# Patient Record
Sex: MALE | Race: WHITE | NOT HISPANIC OR LATINO | Employment: UNEMPLOYED | URBAN - METROPOLITAN AREA
[De-identification: names, ages, dates, MRNs, and addresses within clinical notes are randomized per-mention and may not be internally consistent; named-entity substitution may affect disease eponyms.]

---

## 2017-03-07 ENCOUNTER — ALLSCRIPTS OFFICE VISIT (OUTPATIENT)
Dept: OTHER | Facility: OTHER | Age: 57
End: 2017-03-07

## 2017-03-07 DIAGNOSIS — E11.9 TYPE 2 DIABETES MELLITUS WITHOUT COMPLICATIONS (HCC): ICD-10-CM

## 2017-03-07 LAB — HBA1C MFR BLD HPLC: NORMAL %

## 2017-03-08 ENCOUNTER — LAB (OUTPATIENT)
Dept: LAB | Facility: CLINIC | Age: 57
End: 2017-03-08
Payer: COMMERCIAL

## 2017-03-08 DIAGNOSIS — E11.9 TYPE 2 DIABETES MELLITUS WITHOUT COMPLICATIONS (HCC): ICD-10-CM

## 2017-03-08 LAB
ALBUMIN SERPL BCP-MCNC: 3.4 G/DL (ref 3.5–5)
ALP SERPL-CCNC: 69 U/L (ref 46–116)
ALT SERPL W P-5'-P-CCNC: 27 U/L (ref 12–78)
ANION GAP SERPL CALCULATED.3IONS-SCNC: 7 MMOL/L (ref 4–13)
AST SERPL W P-5'-P-CCNC: 18 U/L (ref 5–45)
BILIRUB DIRECT SERPL-MCNC: 0.12 MG/DL (ref 0–0.2)
BILIRUB SERPL-MCNC: 0.42 MG/DL (ref 0.2–1)
BUN SERPL-MCNC: 11 MG/DL (ref 5–25)
CALCIUM SERPL-MCNC: 8.7 MG/DL (ref 8.3–10.1)
CHLORIDE SERPL-SCNC: 104 MMOL/L (ref 100–108)
CHOLEST SERPL-MCNC: 169 MG/DL (ref 50–200)
CO2 SERPL-SCNC: 29 MMOL/L (ref 21–32)
CREAT SERPL-MCNC: 0.68 MG/DL (ref 0.6–1.3)
EST. AVERAGE GLUCOSE BLD GHB EST-MCNC: 126 MG/DL
GFR SERPL CREATININE-BSD FRML MDRD: >60 ML/MIN/1.73SQ M
GLUCOSE SERPL-MCNC: 93 MG/DL (ref 65–140)
HBA1C MFR BLD: 6 % (ref 4.2–6.3)
HDLC SERPL-MCNC: 55 MG/DL (ref 40–60)
LDLC SERPL CALC-MCNC: 87 MG/DL (ref 0–100)
POTASSIUM SERPL-SCNC: 3.9 MMOL/L (ref 3.5–5.3)
PROT SERPL-MCNC: 7.2 G/DL (ref 6.4–8.2)
SODIUM SERPL-SCNC: 140 MMOL/L (ref 136–145)
TRIGL SERPL-MCNC: 136 MG/DL

## 2017-03-08 PROCEDURE — 80061 LIPID PANEL: CPT

## 2017-03-08 PROCEDURE — 80053 COMPREHEN METABOLIC PANEL: CPT

## 2017-03-08 PROCEDURE — 36415 COLL VENOUS BLD VENIPUNCTURE: CPT

## 2017-03-08 PROCEDURE — 82248 BILIRUBIN DIRECT: CPT

## 2017-03-08 PROCEDURE — 83036 HEMOGLOBIN GLYCOSYLATED A1C: CPT

## 2017-03-23 ENCOUNTER — GENERIC CONVERSION - ENCOUNTER (OUTPATIENT)
Dept: OTHER | Facility: OTHER | Age: 57
End: 2017-03-23

## 2017-04-07 ENCOUNTER — ALLSCRIPTS OFFICE VISIT (OUTPATIENT)
Dept: OTHER | Facility: OTHER | Age: 57
End: 2017-04-07

## 2017-06-05 ENCOUNTER — GENERIC CONVERSION - ENCOUNTER (OUTPATIENT)
Dept: OTHER | Facility: OTHER | Age: 57
End: 2017-06-05

## 2017-06-27 ENCOUNTER — ALLSCRIPTS OFFICE VISIT (OUTPATIENT)
Dept: OTHER | Facility: OTHER | Age: 57
End: 2017-06-27

## 2017-06-27 ENCOUNTER — TRANSCRIBE ORDERS (OUTPATIENT)
Dept: ADMINISTRATIVE | Facility: HOSPITAL | Age: 57
End: 2017-06-27

## 2017-06-27 DIAGNOSIS — G47.30 SLEEP APNEA, UNSPECIFIED TYPE: Primary | ICD-10-CM

## 2017-07-10 ENCOUNTER — HOSPITAL ENCOUNTER (OUTPATIENT)
Dept: SLEEP CENTER | Facility: CLINIC | Age: 57
Discharge: HOME | End: 2017-07-10
Payer: COMMERCIAL

## 2017-07-10 DIAGNOSIS — G47.33 OBSTRUCTIVE SLEEP APNEA OF ADULT: ICD-10-CM

## 2017-07-10 DIAGNOSIS — G47.30 SLEEP APNEA, UNSPECIFIED TYPE: ICD-10-CM

## 2017-07-10 PROCEDURE — 95811 POLYSOM 6/>YRS CPAP 4/> PARM: CPT

## 2017-08-24 ENCOUNTER — APPOINTMENT (OUTPATIENT)
Dept: LAB | Facility: CLINIC | Age: 57
End: 2017-08-24
Payer: COMMERCIAL

## 2017-08-24 ENCOUNTER — GENERIC CONVERSION - ENCOUNTER (OUTPATIENT)
Dept: OTHER | Facility: OTHER | Age: 57
End: 2017-08-24

## 2017-08-24 ENCOUNTER — TRANSCRIBE ORDERS (OUTPATIENT)
Dept: SLEEP CENTER | Facility: CLINIC | Age: 57
End: 2017-08-24

## 2017-08-24 DIAGNOSIS — E11.9 TYPE 2 DIABETES MELLITUS WITHOUT COMPLICATIONS (HCC): ICD-10-CM

## 2017-08-24 DIAGNOSIS — G47.61 PERIODIC LIMB MOVEMENT DISORDER: ICD-10-CM

## 2017-08-24 DIAGNOSIS — G47.33 OSA (OBSTRUCTIVE SLEEP APNEA): Primary | ICD-10-CM

## 2017-08-24 LAB
FERRITIN SERPL-MCNC: 148 NG/ML (ref 8–388)
FOLATE SERPL-MCNC: >20 NG/ML (ref 3.1–17.5)
VIT B12 SERPL-MCNC: 391 PG/ML (ref 100–900)

## 2017-08-24 PROCEDURE — 82607 VITAMIN B-12: CPT

## 2017-08-24 PROCEDURE — 36415 COLL VENOUS BLD VENIPUNCTURE: CPT

## 2017-08-24 PROCEDURE — 82746 ASSAY OF FOLIC ACID SERUM: CPT

## 2017-08-24 PROCEDURE — 82728 ASSAY OF FERRITIN: CPT

## 2017-09-03 ENCOUNTER — HOSPITAL ENCOUNTER (OUTPATIENT)
Dept: SLEEP CENTER | Facility: CLINIC | Age: 57
Discharge: HOME | End: 2017-09-03
Payer: COMMERCIAL

## 2017-09-11 ENCOUNTER — HOSPITAL ENCOUNTER (OUTPATIENT)
Dept: SLEEP CENTER | Facility: CLINIC | Age: 57
Discharge: HOME | End: 2017-09-11
Payer: COMMERCIAL

## 2017-09-11 ENCOUNTER — TRANSCRIBE ORDERS (OUTPATIENT)
Dept: SLEEP CENTER | Facility: CLINIC | Age: 57
End: 2017-09-11

## 2017-09-11 DIAGNOSIS — G47.33 OSA (OBSTRUCTIVE SLEEP APNEA): ICD-10-CM

## 2017-09-11 DIAGNOSIS — G47.33 OSA (OBSTRUCTIVE SLEEP APNEA): Primary | ICD-10-CM

## 2017-11-08 ENCOUNTER — HOSPITAL ENCOUNTER (OUTPATIENT)
Dept: SLEEP CENTER | Facility: CLINIC | Age: 57
Discharge: HOME | End: 2017-11-08
Payer: COMMERCIAL

## 2017-11-08 ENCOUNTER — TRANSCRIBE ORDERS (OUTPATIENT)
Dept: SLEEP CENTER | Facility: CLINIC | Age: 57
End: 2017-11-08

## 2017-11-08 DIAGNOSIS — G47.33 OSA (OBSTRUCTIVE SLEEP APNEA): ICD-10-CM

## 2017-11-08 DIAGNOSIS — G47.33 OSA (OBSTRUCTIVE SLEEP APNEA): Primary | ICD-10-CM

## 2017-11-08 NOTE — PROGRESS NOTES
Follow-Up Note - Sloane   65 y o  male  :1960  VEC:[id number]    CC: I saw this patient for follow-up in clinic today for his Sleep Disordered Breathing, Coexisting Sleep and Medical Problems  Past medical, Family, Social History, ROS and medications were reviewed  Herewith my findings in summary  Full Details are available on request      HPI:  With respect to his positive airway pressure therapy he reports use  7  nights/week for at least  4  hours and is benefiting from  use  He experiencing no major difficulties tolerating or adverse effects  He reports dry mouth  Compliance data onfirms use > 4 hours 87 percent  of the time ] The AHI is 25/hour at  pressure of 8 centimeter H2O  However, his titration study demonstrated while 8 centimeters was sufficient he was not observed while supine  Sleep Routine: Sharlene Trinh reports getting 5-7 hours sleep; he is no longer having difficulty initiating or maintaining sleep  He has nocturia around 2 times a night  Francisca Settle He awakens feeling refreshed and denied excessive drowsiness  He rated himself at to /24 on the Clawson sleepiness scale  He is not aware of jerking movements during sleep  On Exam:  Apart from weight increase to 247 pounds, Physical findings are essentially unchanged from previous  Impression :   1  Severe Obstructive Sleep Apnea  2  Severe periodic limb movements of sleep  3  Insomnia - improved  4  Dry mouth  5  Morbid obesity  6  Comorbidities:  Hypertension and diabetes    Plan:  1  Treatment with  PAP is medically necessary and Sharlene Trinh is agreable to continue use  Pressure setting: Change to auto titrating Pap at 8-15 centimeters   2  Instruction on care of equipment and strategies to improve comfort with use of PAP were discussed  A prescription to replace supplies as needed was provided and care coordinated with the DME provider     3  Need for compliance with therapy and weight reduction were emphasized  4  I advised on sleep hygiene specifically increasing the amount of sleep that he gets  5  No treatment is needed for his periodic limb movements of sleep  6  Follow-up is advised in 3 months or sooner if needed to monitor progress and to adjust therapy  Thank you for allowing me to participate in the care of this patient      Sincerely,    Taniya Nugent MD  Board Certified Specialist

## 2017-11-15 ENCOUNTER — HOSPITAL ENCOUNTER (OUTPATIENT)
Dept: SLEEP CENTER | Facility: CLINIC | Age: 57
Discharge: HOME | End: 2017-11-15
Payer: COMMERCIAL

## 2017-11-15 ENCOUNTER — TRANSCRIBE ORDERS (OUTPATIENT)
Dept: SLEEP CENTER | Facility: CLINIC | Age: 57
End: 2017-11-15

## 2017-11-15 DIAGNOSIS — G47.33 OSA ON CPAP: ICD-10-CM

## 2017-11-15 DIAGNOSIS — Z99.89 OSA ON CPAP: Primary | ICD-10-CM

## 2017-11-15 DIAGNOSIS — G47.33 OSA ON CPAP: Primary | ICD-10-CM

## 2017-11-15 DIAGNOSIS — Z99.89 OSA ON CPAP: ICD-10-CM

## 2018-01-05 ENCOUNTER — ALLSCRIPTS OFFICE VISIT (OUTPATIENT)
Dept: OTHER | Facility: OTHER | Age: 58
End: 2018-01-05

## 2018-01-05 DIAGNOSIS — M79.604 PAIN OF RIGHT LEG: ICD-10-CM

## 2018-01-05 DIAGNOSIS — E11.9 TYPE 2 DIABETES MELLITUS WITHOUT COMPLICATIONS (HCC): ICD-10-CM

## 2018-01-05 LAB — HBA1C MFR BLD HPLC: 5.8 %

## 2018-01-09 NOTE — PROGRESS NOTES
Assessment   1  Diffuse pain in right lower extremity (729 5) (M79 604)   2  Diabetes mellitus, type 2 (250 00) (E11 9)    Plan   Diabetes mellitus, type 2    · (1) MICROALBUMIN CREATININE RATIO, RANDOM URINE; Status:Active; Requested KGP:[id number]; Diffuse pain in right lower extremity    · Orthopedic Surgery Referral Other Co-Management  *  Status: Hold For - Scheduling  Requested for:    [id number]  are Referring to a non- Preferred Provider : Provider not listed in [employer]  Care Summary provided  : Yes    Discussion/Summary   Discussion Summary:    Right leg pain is given referral order for orthopedic  He plans to go to [facility]  Mellitus A1C ordered  Microalbumin ordered  Advised to continue taking meds as directed  Counseling Documentation With Imm: The patient was counseled regarding diagnostic results,-- instructions for management,-- risk factor reductions,-- risks and benefits of treatment options,-- importance of compliance with treatment  Patient Education: Educational resources provided:    Medication SE Review and Pt Understands Tx: Possible side effects of new medications were reviewed with the patient/guardian today  The treatment plan was reviewed with the patient/guardian  The patient/guardian understands and agrees with the treatment plan      Chief Complaint   Chief Complaint Free Text Note Form: Presents to the clinic for follow-up from the emergency department      History of Present Illness   HPI: as above leg pain started yesterday two weeks ago  The pain was unprovoked, worse with walking  The pain resolved after several days [facility] the [facility] increased on the right [facility]  It was at 10/10 at its worse yesterday, constant  He went to [facility] in [place] and today he rates it at 5/10  Able to walk on it today  He was referred to [facility] orthopedic and is requesting PCP referral  Achy pain  Today wearing a leg brace and also uses crutches   Was prescribed ibuprofen 600 mg while at the ED and he has not taken any meds since that time  Has a Hx of DM x 1 year or more  Last A1C was March 2017 and was 6%  Takes metformin 500 mg daily  POC hemoglobin A1c at the clinic today is 5 8%  Urine Microalbumin ordered  could not get his weight b/c he could stand steady on the left leg    Hospital Based Practices Required Assessment:      Pain Assessment      the patient states they have pain  The pain is located in the Right Leg  The patient describes the pain as aching  (on a scale of 0 to 10, the patient rates the pain at 5 )       Prefered Language is  [place]  Primary Language is  English  Education Completed: disease/condition-- and-- treatment/procedure      Teaching Method: verbal      Person Taught: patient      Evaluation Of Learning: verbalized/demonstrated understanding      Review of Systems   Complete-Male:      Constitutional: No fever or chills, feels well, no tiredness, no recent weight gain or weight loss  Cardiovascular: No complaints of slow heart rate, no fast heart rate, no chest pain, no palpitations, no leg claudication, no lower extremity  Respiratory: No complaints of shortness of breath, no wheezing, no cough, no SOB on exertion, no orthopnea or PND  Musculoskeletal: limb pain,-- myalgias-- and-- Right leg pain, but-- as noted in HPI,-- no joint swelling,-- no joint stiffness-- and-- no limb swelling  ROS Reviewed:    ROS reviewed  Active Problems   1  Benign essential hypertension (401 1) (I10)   2  Diabetes mellitus, type 2 (250 00) (E11 9)   3  Hypercholesterolemia (272 0) (E78 00)   4  History of Need for vaccination for DTP (V06 1) (Z23)   5  Obesity (278 00) (E66 9)   6  Obstructive sleep apnea (327 23) (G47 33)   7  Onychomycosis (110 1) (B35 1)   8  Periodic limb movement sleep disorder (327 51) (G47 61)   9  Screening for colon cancer (V76 51) (Z12 11)   10  Sleep disturbance (780 50) (G47 9)   11  Sleep related hypoxia (327 24) (G47 34)   12  Vitamin D deficiency (268 9) (E55 9)   13  Xerosis of skin (706 8) (L85 3)    Past Medical History   1  History of gastroesophageal reflux (GERD) (V12 79) (Z87 19)   2  History of hypertension (V12 59) (Z86 79)   3  History of Need for immunization against influenza (V04 81) (Z23)   4  History of Need for vaccination for DTP (V06 1) (Z23)  Active Problems And Past Medical History Reviewed: The active problems and past medical history were reviewed and updated today  Surgical History   1  History of Amputation Of Finger, With Neurectomy And Flaps  Surgical History Reviewed: The surgical history was reviewed and updated today  Family History   Mother    1  Family history of malignant neoplasm of uterus (V16 49) (Z80 49)  Father    2  Family history of diabetes mellitus (V18 0) (Z83 3)   3  Family history of peripheral vascular disease (V17 49) (Z82 49)  Sister    4  Family history of Bone cancer  Brother    5  Family history of Severe head trauma  Family History Reviewed: The family history was reviewed and updated today  Social History    · Former smoker (V15 82) (F10 020)   · Marital History - Single   · Sedentary Lifestyle (V69 0)   · Sexually Active   · Working Part-time  Social History Reviewed: The social history was reviewed and updated today  The social history was reviewed and is unchanged  Current Meds    1  Accu-Chek Skylar Plus In Vitro Strip; TEST FBS 2x/week; Therapy: 86GLG4814 to (Last Rx:08Mar2017)  Requested for: 53CQA8973 Ordered   2  Accu-Chek Skylar Plus w/Device Kit; USE AS DIRECTED; Therapy: 25NJI8001 to (Last Rx:08Mar2017)  Requested for: 59RCP0174 Ordered   3  Accu-Chek Softclix Lancets Miscellaneous; check FBS 2x/week; Therapy: 45LXP8900 to (Last Rx:08Mar2017)  Requested for: 33OUS5490 Ordered   4  AmLactin 12 % External Lotion; APPLY  AND RUB  IN A THIN FILM TO AFFECTED AREAS TWICE     DAILY  (AM AND PM); Therapy: 44VSE4981 to (Last Rx:18Qzz1078)  Requested for: 22Nek6887 Ordered   5  Lisinopril-Hydrochlorothiazide 20-12 5 MG Oral Tablet; TAKE 1 TABLET DAILY; Therapy: 17PLV0791 to (Evaluate:34Cvw7513)  Requested for: 07Def6382; Last Rx:30Bdi7624     Ordered   6  Lovastatin 10 MG Oral Tablet; Take 1 tablet by mouth at bedtime; Therapy: 03NZH1034 to (Evaluate:17Rbb0880)  Requested for: 49SRP6042; Last Rx:12Kxu9529     Ordered   7  MetFORMIN HCl - 500 MG Oral Tablet; TAKE ONE TABLET BY MOUTH IN THE EVENING; Therapy: 34Ljw1811 to (Evaluate:79Fde0169)  Requested for: 70Ttk7899; Last Rx:40Jcc6170     Ordered   8  Pramipexole Dihydrochloride 0 125 MG Oral Tablet; TAKE 1 TABLET Bedtime; Therapy: 91Iwm9383 to (Evaluate:17Lnh4527)  Requested for: 70ELF3874; Last Rx:27Nov2017     Ordered   9  Vitamin D3 2000 UNIT Oral Capsule; take 1 capsule by mouth once daily; Therapy: 44Qyn6650 to (Vijay Colin)  Requested for: 75QMR1089; Last Rx:45Ksq6334     Ordered  Medication List Reviewed: The medication list was reviewed and updated today  Allergies   1  No Known Drug Allergies  2  No Known Environmental Allergies   3  No Known Food Allergies    Vitals   Vital Signs    Recorded: 61NXX1993 12:56PM   Temperature 97 7 F   Heart Rate 88   Systolic 305   Diastolic 90   Height Unobtainable Yes   Weight Unobtainable Yes     Physical Exam        Constitutional      General appearance: No acute distress, well appearing and well nourished  well developed,-- normal body odor,-- does not smell of feces,-- does not smell of urine,-- well nourished,-- clothing appropriate-- and-- well groomed  Pulmonary      Respiratory effort: No increased work of breathing or signs of respiratory distress  Auscultation of lungs: Clear to auscultation, equal breath sounds bilaterally, no wheezes, no rales, no rhonci         Cardiovascular      Auscultation of heart: Normal rate and rhythm, normal S1 and S2, without murmurs  Musculoskeletal      Gait and station: Abnormal  -- Walks with crutches, with slight limp favoring the right leg  Inspection/palpation of joints, bones, and muscles: Abnormal  -- No apparent leg weakness  No significant swelling  No redness or discoloration anywhere on the leg  Psychiatric      Orientation to person, place and time: Normal        Mood and affect: Normal           Attending Note   Collaborating Physician Note: Collaborating Physician: I agree with the Advanced Practitioner note  Agree with Advanced Practitioner Note Except: In the setting of h/o of HTN, HPL and DMII would consider MILTON of BL LE  Signatures    Electronically signed by : Adin Levy; Jan 5 2018  2:09PM EST                       (Author)     Electronically signed by : Irma Torres, [facility]; Jan 5 2018  2:11PM EST                       (Author)     Electronically signed by :  ANGELICA Srinivasan ; Jan 8 2018 10:01AM EST                       (Review)

## 2018-01-10 ENCOUNTER — APPOINTMENT (OUTPATIENT)
Dept: LAB | Facility: CLINIC | Age: 58
End: 2018-01-10
Payer: COMMERCIAL

## 2018-01-10 DIAGNOSIS — E11.9 TYPE 2 DIABETES MELLITUS WITHOUT COMPLICATIONS (HCC): ICD-10-CM

## 2018-01-10 LAB
CREAT UR-MCNC: 150 MG/DL
MICROALBUMIN UR-MCNC: 12.7 MG/L (ref 0–20)
MICROALBUMIN/CREAT 24H UR: 8 MG/G CREATININE (ref 0–30)

## 2018-01-10 PROCEDURE — 82570 ASSAY OF URINE CREATININE: CPT

## 2018-01-10 PROCEDURE — 82043 UR ALBUMIN QUANTITATIVE: CPT

## 2018-01-10 NOTE — MISCELLANEOUS
Reason For Visit  Reason For Visit Free Text Note Form: Assistance with obtaining Insurance and coordinating health care     Case Management Documentation [facility]:   Information obtained from the patient and medical record  Patient's financial status unemployed and no medical insurance  Action Plan: supportive counseling/advocacy, information provided and Referral to [facility] Assistance  plan reviewed  Progress Note  AIMEE met with this 47 y/o single male pt who is requesting help with getting a sleep study  SW has explained the [facility] which does have a $1,500 00 pt co-pay  Pt can not afford same so will need to reschedule  Pt does have an application pending with [employer] at [address]  Call place to [facility] at [phone] re application at pt's request  Pt does need to supply additional info  Sw has faxed ID card at pt's request and will fax additional info when he has it available Pt also referred to [facility]  It is hopefully pt will be approved and will then be able to get funding for his colonoscopy and blood work  Pt aware that  Laura's assistance would help with some pt co-pays  SW also reviewed pt's medication list Pt should be able to get his medications from [address]   Pt to f/u with AIMEE as needed  Active Problems    1  Benign essential hypertension (401 1) (I10)   2  Hypercholesterolemia (272 0) (E78 0)   3  Impaired fasting glucose (790 21) (R73 01)   4  History of Need for vaccination for DTP (V06 1) (Z23)   5  Obesity (278 00) (E66 9)   6  Sleep disturbance (780 50) (G47 9)    Current Meds   1  Lisinopril-Hydrochlorothiazide 20-12 5 MG Oral Tablet; TAKE 1 TABLET DAILY; Therapy: 94SSW1657 to (Evaluate:48Gxy4854)  Requested for: 53PHT3423; Last   Rx:61Uun1154 Ordered   2  Lovastatin 10 MG Oral Tablet; Take 1 tablet by mouth at bedtime;    Therapy: 52MTX1523 to (Evaluate:99Asp2831) Requested for: 44JAI4869; Last   Rx:01Mar2016 Ordered    Allergies    1  No Known Drug Allergies    2  No Known Environmental Allergies   3   No Known Food Allergies    Signatures   Electronically signed by : Tika Weller LCSW; Mar  3 2016 10:57AM EST                       (Author)

## 2018-01-12 VITALS
DIASTOLIC BLOOD PRESSURE: 82 MMHG | TEMPERATURE: 97.7 F | HEIGHT: 63 IN | WEIGHT: 231.04 LBS | HEART RATE: 100 BPM | SYSTOLIC BLOOD PRESSURE: 140 MMHG | BODY MASS INDEX: 40.94 KG/M2

## 2018-01-12 NOTE — RESULT NOTES
Verified Results  (1) FOLATE 80Mzq2308 12:33PM [facility] [employer] Order Number: [id number]     Test Name Result Flag Reference   FOLATE >20 0 ng/mL H 3 1-17 5     (1) VITAMIN B12 50Hsh7538 12:33PM [employer] Order Number: [id number]     Test Name Result Flag Reference   VITAMIN B12 391 pg/mL  100-900     (1) FERRITIN 31Chz2884 12:33PM [employer] Order Number: [id number]     Test Name Result Flag Reference   FERRITIN 148 ng/mL  8-953

## 2018-01-13 VITALS
WEIGHT: 227.07 LBS | HEART RATE: 72 BPM | DIASTOLIC BLOOD PRESSURE: 80 MMHG | SYSTOLIC BLOOD PRESSURE: 134 MMHG | TEMPERATURE: 98 F | HEIGHT: 62 IN | BODY MASS INDEX: 41.79 KG/M2

## 2018-01-14 VITALS
DIASTOLIC BLOOD PRESSURE: 86 MMHG | WEIGHT: 226.63 LBS | TEMPERATURE: 98.3 F | BODY MASS INDEX: 40.16 KG/M2 | SYSTOLIC BLOOD PRESSURE: 140 MMHG | HEIGHT: 63 IN | HEART RATE: 92 BPM

## 2018-01-15 NOTE — RESULT NOTES
Message   please call pt and inform that his cholesterol profile is OK    triglycerides sl  elevated, so follow a less 'fast/fatty /fried food" diet    also his Vit D level is very low 20 (should be over 30) so I'll be sending in Vit D supplementation to his pharmacy  f/u in 6 months  I just sent a prior task, but did NOT see this HgbA1c of 6 6 = new only Diabetes! pt has been Impaired fasting glucose, but this test pushes him over the line to being diabetic    Rosella Goldmann please call and have him f/u with me to discuss     Verified Results  (1) CBC/PLT/DIFF 22Apr2016 10:21AM Alisa Be     Test Name Result Flag Reference   WBC COUNT 6 94 Thousand/uL  4 31-10 16   RBC COUNT 4 90 Million/uL  3 88-5 62   HEMOGLOBIN 13 8 g/dL  12 0-17 0   HEMATOCRIT 41 5 %  36 5-49 3   MCV 85 fL  82-98   MCH 28 2 pg  26 8-34 3   MCHC 33 3 g/dL  31 4-37 4   RDW 14 1 %  11 6-15 1   MPV 9 1 fL  8 9-12 7   PLATELET COUNT 115 Thousands/uL  149-390   nRBC AUTOMATED 0 /100 WBCs     NEUTROPHILS RELATIVE PERCENT 57 %  43-75   LYMPHOCYTES RELATIVE PERCENT 25 %  14-44   MONOCYTES RELATIVE PERCENT 9 %  4-12   EOSINOPHILS RELATIVE PERCENT 9 % H 0-6   BASOPHILS RELATIVE PERCENT 0 %  0-1   NEUTROPHILS ABSOLUTE COUNT 3 92 Thousands/µL  1 85-7 62   LYMPHOCYTES ABSOLUTE COUNT 1 74 Thousands/µL  0 60-4 47   MONOCYTES ABSOLUTE COUNT 0 60 Thousand/µL  0 17-1 22   EOSINOPHILS ABSOLUTE COUNT 0 65 Thousand/µL H 0 00-0 61   BASOPHILS ABSOLUTE COUNT 0 02 Thousands/µL  0 00-0 10     (1) COMPREHENSIVE METABOLIC PANEL 82QJG3951 66:28BO [place], [address] Kidney Disease Education Program recommendations are as follows:  GFR calculation is accurate only with a steady state creatinine  Chronic Kidney disease less than 60 ml/min/1 73 sq  meters  Kidney failure less than 15 ml/min/1 73 sq  meters       Test Name Result Flag Reference   GLUCOSE,RANDM 91 mg/dL     If the patient is fasting, the ADA then defines impaired fasting glucose as > 100 mg/dL and diabetes as > or equal to 123 mg/dL  SODIUM 134 mmol/L L 136-145   POTASSIUM 3 8 mmol/L  3 5-5 3   CHLORIDE 100 mmol/L  100-108   CARBON DIOXIDE 31 mmol/L  21-32   ANION GAP (CALC) 3 mmol/L L 4-13   BLOOD UREA NITROGEN 11 mg/dL  5-25   CREATININE 0 78 mg/dL  0 60-1 30   Standardized to IDMS reference method   CALCIUM 8 4 mg/dL  8 3-10 1   BILI, TOTAL 0 45 mg/dL  0 20-1 00   ALK PHOSPHATAS 72 U/L     ALT (SGPT) 61 U/L  12-78   AST(SGOT) 32 U/L  5-45   ALBUMIN 3 7 g/dL  3 5-5 0   TOTAL PROTEIN 7 5 g/dL  6 4-8 2   eGFR Non-African American      >60 0 ml/min/1 73sq m     (1) LIPID PANEL, FASTING 22Apr2016 10:21AM Luci Ahmadi   Triglyceride:         Normal              <150 mg/dl       Borderline High    150-199 mg/dl       High               200-499 mg/dl       Very High          >499 mg/dl  Cholesterol:         Desirable        <200 mg/dl      Borderline High  200-239 mg/dl      High             >239 mg/dl  HDL Cholesterol:        High    >59 mg/dL      Low     <41 mg/dL  LDL CALCULATED:    This screening LDL is a calculated result  It does not have the accuracy of the Direct Measured LDL in the monitoring of patients with hyperlipidemia and/or statin therapy  Direct Measure LDL (YHY521) must be ordered separately in these patients  Test Name Result Flag Reference   CHOLESTEROL 149 mg/dL     HDL,DIRECT 38 mg/dL L 40-60   Specimen collection should occur prior to Metamizole administration due to the potential for falsely depressed results  LDL CHOLESTEROL CALCULATED 73 mg/dL  0-100   TRIGLYCERIDES 192 mg/dL H <=150   Specimen collection should occur prior to N-Acetylcysteine or Metamizole administration due to the potential for falsely depressed results  (1) TSH 22Apr2016 10:21AM Kirstie Green   Patients undergoing fluorescein dye angiography may retain small amounts of fluorescein in the body for 48-72 hours post procedure   Samples containing fluorescein can produce falsely depressed TSH values  If the patient had this procedure,a specimen should be resubmitted post fluorescein clearance  Test Name Result Flag Reference   TSH 3 030 uIU/mL  0 358-3 740     (1) VITAMIN D 25-HYDROXY 22Apr2016 10:21AM Filipe Montero     Test Name Result Flag Reference   VIT D 25-HYDROX 19 9 ng/mL L 30 0-100 0     (1) HEMOGLOBIN A1C 18Ubn1342 10:21AM Filipe Montero   5 7-6 4% impaired fasting glucose  >=6 5% diagnosis of diabetes    Falsely low levels are seen in conditions linked to short RBC life span-  hemolytic anemia, and splenomegaly  Falsely elevated levels are seen in situations where there is an increased production of RBC- receipt of erythropoietin or blood transfusions  Adopted from ADA-Clinical Practice Recommendations     Test Name Result Flag Reference   HEMOGLOBIN A1C 6 6 % H 4 0-5 6   EST  AVG   GLUCOSE 143 mg/dl         Plan  Diabetes mellitus, type 2    · MetFORMIN HCl - 500 MG Oral Tablet; take 1 tablet by mouth every evening  Vitamin D deficiency    · Vitamin D (Ergocalciferol) 33627 UNIT Oral Capsule; TAKE 1 CAPSULE WEEKLY   · Vitamin D3 2000 UNIT Oral Capsule; TAKE 1 TABLET BY MOUTH ONCE DAILY

## 2018-01-22 VITALS
HEART RATE: 100 BPM | TEMPERATURE: 97.4 F | BODY MASS INDEX: 42.42 KG/M2 | WEIGHT: 239.42 LBS | SYSTOLIC BLOOD PRESSURE: 130 MMHG | HEIGHT: 63 IN | DIASTOLIC BLOOD PRESSURE: 76 MMHG

## 2018-01-23 ENCOUNTER — GENERIC CONVERSION - ENCOUNTER (OUTPATIENT)
Dept: OTHER | Facility: OTHER | Age: 58
End: 2018-01-23

## 2018-01-23 VITALS — SYSTOLIC BLOOD PRESSURE: 136 MMHG | DIASTOLIC BLOOD PRESSURE: 90 MMHG | HEART RATE: 88 BPM | TEMPERATURE: 97.7 F

## 2018-02-26 ENCOUNTER — TELEPHONE (OUTPATIENT)
Dept: INTERNAL MEDICINE CLINIC | Facility: CLINIC | Age: 58
End: 2018-02-26

## 2018-02-26 DIAGNOSIS — G47.61 PERIODIC LIMB MOVEMENTS OF SLEEP: Primary | ICD-10-CM

## 2018-02-26 RX ORDER — PRAMIPEXOLE DIHYDROCHLORIDE 0.12 MG/1
0.12 TABLET ORAL DAILY
Qty: 90 TABLET | Refills: 1 | Status: SHIPPED | OUTPATIENT
Start: 2018-02-26 | End: 2018-08-17 | Stop reason: SDUPTHER

## 2018-02-26 RX ORDER — PRAMIPEXOLE DIHYDROCHLORIDE 0.12 MG/1
1 TABLET ORAL
COMMUNITY
Start: 2017-08-24 | End: 2018-02-26 | Stop reason: SDUPTHER

## 2018-03-01 DIAGNOSIS — E78.5 HYPERLIPIDEMIA, UNSPECIFIED HYPERLIPIDEMIA TYPE: Primary | ICD-10-CM

## 2018-03-01 RX ORDER — LOVASTATIN 10 MG/1
10 TABLET ORAL
Qty: 90 TABLET | Refills: 0 | Status: SHIPPED | OUTPATIENT
Start: 2018-03-01 | End: 2018-07-22 | Stop reason: SDUPTHER

## 2018-03-26 DIAGNOSIS — I10 ESSENTIAL HYPERTENSION: ICD-10-CM

## 2018-03-26 DIAGNOSIS — E11.9 TYPE 2 DIABETES MELLITUS WITHOUT COMPLICATION, WITHOUT LONG-TERM CURRENT USE OF INSULIN (HCC): Primary | ICD-10-CM

## 2018-03-26 RX ORDER — LISINOPRIL AND HYDROCHLOROTHIAZIDE 20; 12.5 MG/1; MG/1
1 TABLET ORAL DAILY
COMMUNITY
Start: 2012-09-06 | End: 2018-03-26 | Stop reason: SDUPTHER

## 2018-03-26 RX ORDER — LISINOPRIL AND HYDROCHLOROTHIAZIDE 20; 12.5 MG/1; MG/1
1 TABLET ORAL DAILY
Qty: 90 TABLET | Refills: 1 | Status: SHIPPED | OUTPATIENT
Start: 2018-03-26 | End: 2018-07-21 | Stop reason: SDUPTHER

## 2018-04-20 ENCOUNTER — TELEPHONE (OUTPATIENT)
Dept: INTERNAL MEDICINE CLINIC | Facility: CLINIC | Age: 58
End: 2018-04-20

## 2018-04-20 NOTE — TELEPHONE ENCOUNTER
In jan 2018 I had referred him to Orthopedics and he said he was planing to go to orthopedic specialist in [facility]  He should make the apt and f/u with them  If he has lost the order please print it and given it to him  Thanks   teofilo

## 2018-07-21 DIAGNOSIS — I10 ESSENTIAL HYPERTENSION: ICD-10-CM

## 2018-07-22 DIAGNOSIS — E78.5 HYPERLIPIDEMIA, UNSPECIFIED HYPERLIPIDEMIA TYPE: ICD-10-CM

## 2018-07-23 ENCOUNTER — TELEPHONE (OUTPATIENT)
Dept: INTERNAL MEDICINE CLINIC | Facility: CLINIC | Age: 58
End: 2018-07-23

## 2018-07-23 RX ORDER — LISINOPRIL AND HYDROCHLOROTHIAZIDE 20; 12.5 MG/1; MG/1
TABLET ORAL
Qty: 90 TABLET | Refills: 0 | Status: SHIPPED | OUTPATIENT
Start: 2018-07-23 | End: 2018-10-08 | Stop reason: SDUPTHER

## 2018-07-23 RX ORDER — LOVASTATIN 10 MG/1
10 TABLET ORAL
Qty: 90 TABLET | Refills: 0 | Status: SHIPPED | OUTPATIENT
Start: 2018-07-23 | End: 2018-10-20 | Stop reason: SDUPTHER

## 2018-07-24 NOTE — TELEPHONE ENCOUNTER
PATIENT MADE AWARE OF MEDS SENT TO PHARMACY AND TO FOLLOW UP  IN 6 MONTHS IN ORDER TO GET MORE REFILLS  CALLED TRANSFER TO CLERICAL TEAM TO SCHEDULE APPOINTMENT

## 2018-07-30 RX ORDER — LISINOPRIL 20 MG/1
20 TABLET ORAL
COMMUNITY
End: 2018-08-02 | Stop reason: SDUPTHER

## 2018-07-30 RX ORDER — AMMONIUM LACTATE 12 G/100G
LOTION TOPICAL 2 TIMES DAILY
COMMUNITY
Start: 2017-04-07 | End: 2018-08-02 | Stop reason: SDUPTHER

## 2018-07-30 RX ORDER — FERROUS SULFATE 325(65) MG
325 TABLET ORAL
COMMUNITY
End: 2019-03-22 | Stop reason: ALTCHOICE

## 2018-07-30 RX ORDER — ACETAMINOPHEN 160 MG
1 TABLET,DISINTEGRATING ORAL DAILY
COMMUNITY
Start: 2016-04-25 | End: 2019-12-02 | Stop reason: SDUPTHER

## 2018-07-30 RX ORDER — BLOOD-GLUCOSE METER
EACH MISCELLANEOUS
COMMUNITY
Start: 2017-03-08 | End: 2018-12-03

## 2018-07-30 RX ORDER — LANCETS
EACH MISCELLANEOUS
COMMUNITY
Start: 2017-03-08 | End: 2018-12-03

## 2018-08-02 ENCOUNTER — OFFICE VISIT (OUTPATIENT)
Dept: INTERNAL MEDICINE CLINIC | Facility: CLINIC | Age: 58
End: 2018-08-02
Payer: COMMERCIAL

## 2018-08-02 ENCOUNTER — TELEPHONE (OUTPATIENT)
Dept: INTERNAL MEDICINE CLINIC | Facility: CLINIC | Age: 58
End: 2018-08-02

## 2018-08-02 VITALS
TEMPERATURE: 98 F | DIASTOLIC BLOOD PRESSURE: 72 MMHG | BODY MASS INDEX: 42.22 KG/M2 | SYSTOLIC BLOOD PRESSURE: 138 MMHG | HEART RATE: 92 BPM | WEIGHT: 234.57 LBS

## 2018-08-02 DIAGNOSIS — B35.1 ONYCHOMYCOSIS: ICD-10-CM

## 2018-08-02 DIAGNOSIS — E11.9 TYPE 2 DIABETES MELLITUS WITHOUT COMPLICATION, WITHOUT LONG-TERM CURRENT USE OF INSULIN (HCC): ICD-10-CM

## 2018-08-02 DIAGNOSIS — Z11.4 ENCOUNTER FOR SCREENING FOR HIV: ICD-10-CM

## 2018-08-02 DIAGNOSIS — L85.3 XEROSIS OF SKIN: Primary | ICD-10-CM

## 2018-08-02 DIAGNOSIS — Z12.11 ENCOUNTER FOR SCREENING COLONOSCOPY: ICD-10-CM

## 2018-08-02 PROBLEM — G47.61 PERIODIC LIMB MOVEMENT SLEEP DISORDER: Status: ACTIVE | Noted: 2017-07-11

## 2018-08-02 PROBLEM — M79.604 DIFFUSE PAIN IN RIGHT LOWER EXTREMITY: Status: ACTIVE | Noted: 2018-01-05

## 2018-08-02 LAB — SL AMB POCT HEMOGLOBIN AIC: 5.5

## 2018-08-02 PROCEDURE — 83036 HEMOGLOBIN GLYCOSYLATED A1C: CPT | Performed by: NURSE PRACTITIONER

## 2018-08-02 PROCEDURE — 1036F TOBACCO NON-USER: CPT | Performed by: NURSE PRACTITIONER

## 2018-08-02 PROCEDURE — 99213 OFFICE O/P EST LOW 20 MIN: CPT | Performed by: NURSE PRACTITIONER

## 2018-08-02 RX ORDER — AMMONIUM LACTATE 12 G/100G
LOTION TOPICAL 2 TIMES DAILY
Qty: 400 G | Refills: 5 | Status: SHIPPED | OUTPATIENT
Start: 2018-08-02 | End: 2018-08-16 | Stop reason: SDUPTHER

## 2018-08-02 NOTE — PROGRESS NOTES
Assessment/Plan:     Diagnoses and all orders for this visit:    Encounter for screening colonoscopy  -     Ambulatory referral to Gastroenterology; Future    Xerosis of skin  -     ammonium lactate (AMLACTIN) 12 % lotion; Apply topically 2 (two) times a day    Encounter for screening for HIV  -     HIV 1/2 AG-AB combo; Future    Type 2 diabetes mellitus without complication, without long-term current use of insulin ([facility])  -     POCT hemoglobin A1c    Onychomycosis  -     Ambulatory referral to Podiatry; Future              Subjective: patient presents to the clinic for dry skin     Patient ID: Jerry Emanuel  is a 63 y o  male  HPI  Several months ago he had complained of dry skin and was prescribed amlactin  He reports it worked well for him and he when he ran out, he stopped using it  After a while, the dry skin is coming back  heb is here to request refill of the amlactin  He does not have an ongoing diagnosis of DM II and this is likely contributing for the skin dryness  Diabetic foot exam done today is positive for decrease in sensation, callus, and great toenail fungus  Referral to podiatry written  HIV and colonoscopy ordered for health maintenance  No current chest pain, SOB, abd pain, N/V/D  He continues to use R leg brace and crutches, secondary to pain of the RLE after a fall several months ago  Already in care of orthopedic specialist  It has been more than 1 year since his last diabetic eye exam  He is advised to make another apt and go there for annual visit ASAP  Tel # given to patient  The following portions of the patient's history were reviewed and updated as appropriate: allergies, current medications, past family history, past medical history, past social history, past surgical history and problem list     Review of Systems   Constitutional: Negative for appetite change, diaphoresis, fatigue and unexpected weight change     Respiratory: Negative for cough, chest tightness, shortness of breath and wheezing  Cardiovascular: Negative for chest pain and palpitations  Gastrointestinal: Negative for abdominal pain, blood in stool, constipation, diarrhea, nausea and vomiting  Endocrine: Negative for cold intolerance, heat intolerance, polydipsia, polyphagia and polyuria  Musculoskeletal: Positive for gait problem (as above) and myalgias (right leg)  Negative for arthralgias and neck pain  Neurological: Positive for weakness (R leg)  Negative for dizziness, tremors, speech difficulty and headaches  Objective:      /72 (BP Location: Left arm, Patient Position: Sitting, Cuff Size: Adult)   Pulse 92   Temp 98 °F (36 7 °C) (Oral)   Wt 106 kg (234 lb 9 1 oz)   BMI 42 22 kg/m²        Physical Exam   Constitutional: He is oriented to person, place, and time  He appears well-developed and well-nourished  No distress  Cardiovascular: Normal rate, regular rhythm and normal heart sounds  Pulses are no weak pulses  No murmur heard  Pulses:       Dorsalis pedis pulses are 2+ on the right side, and 2+ on the left side  Posterior tibial pulses are 2+ on the right side, and 2+ on the left side  Pulmonary/Chest: Effort normal and breath sounds normal  No respiratory distress  He has no wheezes  Musculoskeletal:        Feet:    Brace of the RLE  Uses crutches for ambulation  Nail fungus of the B/L great toes  Pulses of the feet 2/2  Callus beneath the R foot, anterior to the base of the R 5th toe  No TTP  No ulcers  No break in skin  Mild to moderate dryness of the BLEs  Feet:   Right Foot:   Skin Integrity: Positive for callus  Negative for ulcer, skin breakdown, erythema, warmth or dry skin  Left Foot:   Skin Integrity: Negative for ulcer, skin breakdown, erythema, warmth, callus or dry skin  Neurological: He is alert and oriented to person, place, and time  He has normal reflexes  He exhibits normal muscle tone  Skin: He is not diaphoretic     Psychiatric: He has a normal mood and affect  His behavior is normal  Judgment and thought content normal        Patient's shoes and socks removed  Right Foot/Ankle   Right Foot Inspection  Skin Exam: callus and callus skin not intact, no dry skin, no warmth, no erythema, no maceration, no abnormal color, no pre-ulcer and no ulcer                            Sensory       Monofilament testing: intact  Vascular  Capillary refills: < 3 seconds  The right DP pulse is 2+  The right PT pulse is 2+  Left Foot/Ankle  Left Foot Inspection  Skin Exam: skin not intact, no dry skin, no warmth, no erythema, no maceration, normal color, no pre-ulcer, no ulcer and no callus                                         Sensory       Monofilament: absent  Vascular  Capillary refills: < 3 seconds  The left DP pulse is 2+  The left PT pulse is 2+  Assign Risk Category:  No deformity present; Loss of protective sensation;  No weak pulses       Risk: 1

## 2018-08-02 NOTE — TELEPHONE ENCOUNTER
Grant, can you please call the pharmacy and ask that they give this patient amlactin 12% cream  Our software only brings up the generic (ammonium lactate)  Thanks   Mikayla Duckworth

## 2018-08-06 DIAGNOSIS — L85.3 XEROSIS OF SKIN: ICD-10-CM

## 2018-08-06 RX ORDER — AMMONIUM LACTATE 12 G/100G
LOTION TOPICAL 2 TIMES DAILY
Qty: 400 G | Refills: 0 | OUTPATIENT
Start: 2018-08-06

## 2018-08-06 NOTE — TELEPHONE ENCOUNTER
Pharmacist called in because they did not receive this can you please send into pharmacy again ? Thank you

## 2018-08-16 ENCOUNTER — TELEPHONE (OUTPATIENT)
Dept: INTERNAL MEDICINE CLINIC | Facility: CLINIC | Age: 58
End: 2018-08-16

## 2018-08-16 DIAGNOSIS — L85.3 XEROSIS OF SKIN: ICD-10-CM

## 2018-08-16 RX ORDER — AMMONIUM LACTATE 12 G/100G
LOTION TOPICAL 2 TIMES DAILY
Qty: 400 G | Refills: 5 | Status: SHIPPED | OUTPATIENT
Start: 2018-08-16 | End: 2019-03-22 | Stop reason: SDUPTHER

## 2018-08-16 NOTE — TELEPHONE ENCOUNTER
Left patient detailed messaged, script was sent to pharmacy  As well as his referral for his upcoming [employer] appt

## 2018-08-17 DIAGNOSIS — G47.61 PERIODIC LIMB MOVEMENTS OF SLEEP: ICD-10-CM

## 2018-08-17 RX ORDER — PRAMIPEXOLE DIHYDROCHLORIDE 0.12 MG/1
0.12 TABLET ORAL DAILY
Qty: 90 TABLET | Refills: 1 | Status: SHIPPED | OUTPATIENT
Start: 2018-08-17 | End: 2018-10-03 | Stop reason: SDUPTHER

## 2018-08-21 LAB
LEFT EYE DIABETIC RETINOPATHY: NORMAL
RIGHT EYE DIABETIC RETINOPATHY: NORMAL

## 2018-08-21 PROCEDURE — 3072F LOW RISK FOR RETINOPATHY: CPT | Performed by: NURSE PRACTITIONER

## 2018-09-12 DIAGNOSIS — E11.9 TYPE 2 DIABETES MELLITUS WITHOUT COMPLICATION, WITHOUT LONG-TERM CURRENT USE OF INSULIN (HCC): ICD-10-CM

## 2018-09-18 RX ORDER — BLOOD-GLUCOSE METER
KIT MISCELLANEOUS
Refills: 0 | OUTPATIENT
Start: 2018-09-18

## 2018-09-19 DIAGNOSIS — E11.9 TYPE 2 DIABETES MELLITUS WITHOUT COMPLICATION, WITHOUT LONG-TERM CURRENT USE OF INSULIN (HCC): Primary | ICD-10-CM

## 2018-10-01 DIAGNOSIS — E11.9 TYPE 2 DIABETES MELLITUS WITHOUT COMPLICATION, WITHOUT LONG-TERM CURRENT USE OF INSULIN (HCC): ICD-10-CM

## 2018-10-03 DIAGNOSIS — G47.61 PERIODIC LIMB MOVEMENTS OF SLEEP: ICD-10-CM

## 2018-10-04 RX ORDER — PRAMIPEXOLE DIHYDROCHLORIDE 0.12 MG/1
0.12 TABLET ORAL DAILY
Qty: 90 TABLET | Refills: 0 | Status: SHIPPED | OUTPATIENT
Start: 2018-10-04 | End: 2019-05-23 | Stop reason: SDUPTHER

## 2018-10-08 DIAGNOSIS — I10 ESSENTIAL HYPERTENSION: ICD-10-CM

## 2018-10-08 RX ORDER — LISINOPRIL AND HYDROCHLOROTHIAZIDE 20; 12.5 MG/1; MG/1
TABLET ORAL
Qty: 90 TABLET | Refills: 0 | Status: SHIPPED | OUTPATIENT
Start: 2018-10-08 | End: 2018-12-18 | Stop reason: SDUPTHER

## 2018-10-20 DIAGNOSIS — E78.5 HYPERLIPIDEMIA, UNSPECIFIED HYPERLIPIDEMIA TYPE: ICD-10-CM

## 2018-10-20 RX ORDER — LOVASTATIN 10 MG/1
10 TABLET ORAL
Qty: 90 TABLET | Refills: 0 | Status: SHIPPED | OUTPATIENT
Start: 2018-10-20 | End: 2018-12-18 | Stop reason: SDUPTHER

## 2018-12-03 ENCOUNTER — APPOINTMENT (EMERGENCY)
Dept: NON INVASIVE DIAGNOSTICS | Facility: HOSPITAL | Age: 58
End: 2018-12-03
Payer: COMMERCIAL

## 2018-12-03 ENCOUNTER — OFFICE VISIT (OUTPATIENT)
Dept: URGENT CARE | Age: 58
End: 2018-12-03
Payer: COMMERCIAL

## 2018-12-03 ENCOUNTER — HOSPITAL ENCOUNTER (OUTPATIENT)
Facility: HOSPITAL | Age: 58
Setting detail: OBSERVATION
Discharge: HOME | End: 2018-12-06
Attending: EMERGENCY MEDICINE | Admitting: INTERNAL MEDICINE
Payer: COMMERCIAL

## 2018-12-03 ENCOUNTER — TELEPHONE (OUTPATIENT)
Dept: INTERNAL MEDICINE CLINIC | Facility: CLINIC | Age: 58
End: 2018-12-03

## 2018-12-03 VITALS
HEART RATE: 110 BPM | TEMPERATURE: 98 F | HEIGHT: 65 IN | RESPIRATION RATE: 20 BRPM | DIASTOLIC BLOOD PRESSURE: 87 MMHG | OXYGEN SATURATION: 95 % | BODY MASS INDEX: 43.32 KG/M2 | WEIGHT: 260 LBS | SYSTOLIC BLOOD PRESSURE: 164 MMHG

## 2018-12-03 DIAGNOSIS — M79.89 PAIN AND SWELLING OF LEFT LOWER LEG: Primary | ICD-10-CM

## 2018-12-03 DIAGNOSIS — M79.662 PAIN AND SWELLING OF LEFT LOWER LEG: Primary | ICD-10-CM

## 2018-12-03 DIAGNOSIS — M79.89 LOCALIZED SWELLING OF BOTH LOWER EXTREMITIES: ICD-10-CM

## 2018-12-03 DIAGNOSIS — L03.90 CELLULITIS: Primary | ICD-10-CM

## 2018-12-03 LAB
ANION GAP SERPL CALCULATED.3IONS-SCNC: 5 MMOL/L (ref 4–13)
BASOPHILS # BLD AUTO: 0.03 THOUSANDS/ΜL (ref 0–0.1)
BASOPHILS NFR BLD AUTO: 0 % (ref 0–1)
BUN SERPL-MCNC: 12 MG/DL (ref 5–25)
CALCIUM SERPL-MCNC: 8.7 MG/DL (ref 8.3–10.1)
CHLORIDE SERPL-SCNC: 103 MMOL/L (ref 100–108)
CO2 SERPL-SCNC: 30 MMOL/L (ref 21–32)
CREAT SERPL-MCNC: 0.75 MG/DL (ref 0.6–1.3)
EOSINOPHIL # BLD AUTO: 0.45 THOUSAND/ΜL (ref 0–0.61)
EOSINOPHIL NFR BLD AUTO: 5 % (ref 0–6)
ERYTHROCYTE [DISTWIDTH] IN BLOOD BY AUTOMATED COUNT: 13.4 % (ref 11.6–15.1)
GFR SERPL CREATININE-BSD FRML MDRD: 102 ML/MIN/1.73SQ M
GLUCOSE SERPL-MCNC: 88 MG/DL (ref 65–140)
GLUCOSE SERPL-MCNC: 88 MG/DL (ref 65–140)
HCT VFR BLD AUTO: 42.4 % (ref 36.5–49.3)
HGB BLD-MCNC: 13.7 G/DL (ref 12–17)
IMM GRANULOCYTES # BLD AUTO: 0.04 THOUSAND/UL (ref 0–0.2)
IMM GRANULOCYTES NFR BLD AUTO: 0 % (ref 0–2)
LYMPHOCYTES # BLD AUTO: 2.53 THOUSANDS/ΜL (ref 0.6–4.47)
LYMPHOCYTES NFR BLD AUTO: 28 % (ref 14–44)
MCH RBC QN AUTO: 28 PG (ref 26.8–34.3)
MCHC RBC AUTO-ENTMCNC: 32.3 G/DL (ref 31.4–37.4)
MCV RBC AUTO: 87 FL (ref 82–98)
MONOCYTES # BLD AUTO: 0.76 THOUSAND/ΜL (ref 0.17–1.22)
MONOCYTES NFR BLD AUTO: 8 % (ref 4–12)
NEUTROPHILS # BLD AUTO: 5.29 THOUSANDS/ΜL (ref 1.85–7.62)
NEUTS SEG NFR BLD AUTO: 59 % (ref 43–75)
NRBC BLD AUTO-RTO: 0 /100 WBCS
PLATELET # BLD AUTO: 238 THOUSANDS/UL (ref 149–390)
PMV BLD AUTO: 8.3 FL (ref 8.9–12.7)
POTASSIUM SERPL-SCNC: 3.7 MMOL/L (ref 3.5–5.3)
RBC # BLD AUTO: 4.89 MILLION/UL (ref 3.88–5.62)
SODIUM SERPL-SCNC: 138 MMOL/L (ref 136–145)
WBC # BLD AUTO: 9.1 THOUSAND/UL (ref 4.31–10.16)

## 2018-12-03 PROCEDURE — 99285 EMERGENCY DEPT VISIT HI MDM: CPT

## 2018-12-03 PROCEDURE — 85025 COMPLETE CBC W/AUTO DIFF WBC: CPT | Performed by: EMERGENCY MEDICINE

## 2018-12-03 PROCEDURE — 82948 REAGENT STRIP/BLOOD GLUCOSE: CPT

## 2018-12-03 PROCEDURE — 36415 COLL VENOUS BLD VENIPUNCTURE: CPT | Performed by: EMERGENCY MEDICINE

## 2018-12-03 PROCEDURE — 80048 BASIC METABOLIC PNL TOTAL CA: CPT | Performed by: EMERGENCY MEDICINE

## 2018-12-03 PROCEDURE — 99213 OFFICE O/P EST LOW 20 MIN: CPT | Performed by: FAMILY MEDICINE

## 2018-12-03 PROCEDURE — 93005 ELECTROCARDIOGRAM TRACING: CPT

## 2018-12-03 PROCEDURE — 93971 EXTREMITY STUDY: CPT | Performed by: SURGERY

## 2018-12-03 PROCEDURE — 99219 PR INITIAL OBSERVATION CARE/DAY 50 MINUTES: CPT | Performed by: INTERNAL MEDICINE

## 2018-12-03 PROCEDURE — 96365 THER/PROPH/DIAG IV INF INIT: CPT

## 2018-12-03 PROCEDURE — 93971 EXTREMITY STUDY: CPT

## 2018-12-03 RX ORDER — PRAMIPEXOLE DIHYDROCHLORIDE 0.25 MG/1
0.12 TABLET ORAL ONCE
Status: COMPLETED | OUTPATIENT
Start: 2018-12-03 | End: 2018-12-03

## 2018-12-03 RX ORDER — SODIUM CHLORIDE 9 MG/ML
75 INJECTION, SOLUTION INTRAVENOUS CONTINUOUS
Status: DISCONTINUED | OUTPATIENT
Start: 2018-12-03 | End: 2018-12-04

## 2018-12-03 RX ORDER — DIPHENHYDRAMINE HYDROCHLORIDE 50 MG/ML
25 INJECTION INTRAMUSCULAR; INTRAVENOUS ONCE
Status: DISCONTINUED | OUTPATIENT
Start: 2018-12-03 | End: 2018-12-06 | Stop reason: HOSPADM

## 2018-12-03 RX ORDER — PRAVASTATIN SODIUM 10 MG
10 TABLET ORAL
Status: DISCONTINUED | OUTPATIENT
Start: 2018-12-04 | End: 2018-12-06 | Stop reason: HOSPADM

## 2018-12-03 RX ORDER — PRAMIPEXOLE DIHYDROCHLORIDE 0.25 MG/1
0.12 TABLET ORAL DAILY
Status: DISCONTINUED | OUTPATIENT
Start: 2018-12-04 | End: 2018-12-06 | Stop reason: HOSPADM

## 2018-12-03 RX ORDER — CEFAZOLIN SODIUM 2 G/50ML
2000 SOLUTION INTRAVENOUS EVERY 8 HOURS
Status: DISCONTINUED | OUTPATIENT
Start: 2018-12-03 | End: 2018-12-06 | Stop reason: HOSPADM

## 2018-12-03 RX ADMIN — SODIUM CHLORIDE 1000 ML: 0.9 INJECTION, SOLUTION INTRAVENOUS at 17:50

## 2018-12-03 RX ADMIN — PRAMIPEXOLE DIHYDROCHLORIDE 0.12 MG: 0.25 TABLET ORAL at 23:48

## 2018-12-03 RX ADMIN — ENOXAPARIN SODIUM 40 MG: 40 INJECTION SUBCUTANEOUS at 21:03

## 2018-12-03 RX ADMIN — VANCOMYCIN HYDROCHLORIDE 2000 MG: 1 INJECTION, POWDER, LYOPHILIZED, FOR SOLUTION INTRAVENOUS at 17:51

## 2018-12-03 RX ADMIN — CEFAZOLIN SODIUM 2000 MG: 2 SOLUTION INTRAVENOUS at 21:03

## 2018-12-03 RX ADMIN — SODIUM CHLORIDE 75 ML/HR: 0.9 INJECTION, SOLUTION INTRAVENOUS at 21:03

## 2018-12-03 NOTE — ED PROVIDER NOTES
History  Chief Complaint   Patient presents with    Leg Swelling     patient reports b/l leg swelling, gradually increasing over the past 2 weeks, left worse than right, no pain     63year old male presenting with left lower extremity swelling and redness that started 2 weeks ago  Patient is a non-insulin-dependent diabetic and has multiple open wounds on his left calf  He states he started with mild pain in his posterior calf which has now resolved  Denies chest pain, shortness of breath, fevers, chills  He was evaluated at urgent care earlier today and was advised to present to the emergency department for further evaluation  States the redness and swelling has gotten worse and is left lower leg is now stiffer than normal             Prior to Admission Medications   Prescriptions Last Dose Informant Patient Reported? Taking?    Cholecalciferol (VITAMIN D3) 2000 units capsule Not Taking at Unknown time  Yes No   Sig: Take 1 capsule by mouth daily   ammonium lactate (AMLACTIN) 12 % lotion 12/3/2018 at Unknown time  No Yes   Sig: Apply topically 2 (two) times a day   ferrous sulfate 325 (65 Fe) mg tablet Not Taking at Unknown time  Yes No   Sig: Take 325 mg by mouth   lisinopril-hydrochlorothiazide (PRINZIDE,ZESTORETIC) 20-12 5 MG per tablet 12/3/2018 at Unknown time  No Yes   Sig: TAKE 1 TABLET BY MOUTH EVERY DAY   lovastatin (MEVACOR) 10 MG tablet 12/3/2018 at Unknown time  No Yes   Sig: TAKE 1 TABLET (10 MG TOTAL) BY MOUTH DAILY AT BEDTIME   Patient taking differently: Take 10 mg by mouth daily     metFORMIN (GLUCOPHAGE) 500 mg tablet 12/3/2018 at Unknown time  No Yes   Sig: TAKE 1 TABLET BY MOUTH EVERY DAY   pramipexole (MIRAPEX) 0 125 mg tablet 12/2/2018 at Unknown time  No Yes   Sig: Take 1 tablet (0 125 mg total) by mouth daily      Facility-Administered Medications: None       Past Medical History:   Diagnosis Date    Diabetes mellitus ([facility] [address] )     History of gastroesophageal reflux (GERD)     Hypertension     Last assessed: 10/24/13    Sleep apnea        Past Surgical History:   Procedure Laterality Date    AMPUTATION      of Finger, with Neurectomy and flaps       Family History   Problem Relation Age of Onset    Uterine cancer Mother     Diabetes Father     Vascular Disease Father         peripheral    Bone cancer Sister     Other Brother         severe head trauma     I have reviewed and agree with the history as documented  Social History   Substance Use Topics    Smoking status: Former Smoker     Quit date: 2000    Smokeless tobacco: Never Used    Alcohol use No        Review of Systems   Constitutional: Negative for chills, diaphoresis and fever  HENT: Negative for congestion and rhinorrhea  Eyes: Negative for pain and visual disturbance  Respiratory: Negative for cough, shortness of breath and wheezing  Cardiovascular: Negative for chest pain and leg swelling  Gastrointestinal: Negative for abdominal pain, diarrhea, nausea and vomiting  Genitourinary: Negative for difficulty urinating, dysuria, frequency and urgency  Musculoskeletal: Negative for back pain and neck pain  Skin: Positive for color change and wound  Negative for rash  Neurological: Negative for syncope, numbness and headaches  All other systems reviewed and are negative  Physical Exam  ED Triage Vitals [12/03/18 1450]   Temperature Pulse Respirations Blood Pressure SpO2   98 2 °F (36 8 °C) (!) 109 20 155/76 95 %      Temp Source Heart Rate Source Patient Position - Orthostatic VS BP Location FiO2 (%)   Tympanic Monitor Sitting Left arm --      Pain Score       No Pain           Orthostatic Vital Signs  Vitals:    12/03/18 1450 12/03/18 1723   BP: 155/76 127/71   Pulse: (!) 109 105   Patient Position - Orthostatic VS: Sitting Lying       Physical Exam   Constitutional: He is oriented to person, place, and time  He appears well-developed and well-nourished  No distress     HENT:   Head: Normocephalic and atraumatic  Eyes: Conjunctivae and EOM are normal    Neck: Normal range of motion  Neck supple  Cardiovascular: Normal rate and regular rhythm  Pulmonary/Chest: Effort normal and breath sounds normal  No respiratory distress  He has no wheezes  He has no rales  Abdominal: Soft  Bowel sounds are normal  There is no tenderness  There is no guarding  Musculoskeletal: Normal range of motion  He exhibits edema  He exhibits no tenderness  Neurological: He is alert and oriented to person, place, and time  No cranial nerve deficit  Skin: Skin is warm  He is not diaphoretic  There is erythema  Circumferential erythema of left lower extremity  Multiple open wounds with mild purulent drainage  Intact distal pulses  Psychiatric: He has a normal mood and affect  His behavior is normal    Nursing note and vitals reviewed        ED Medications  Medications   sodium chloride 0 9 % bolus 1,000 mL (1,000 mL Intravenous New Bag 12/3/18 1750)   vancomycin (VANCOCIN) 2,000 mg in sodium chloride 0 9 % 500 mL IVPB (2,000 mg Intravenous New Bag 12/3/18 1751)       Diagnostic Studies  Results Reviewed     Procedure Component Value Units Date/Time    CBC and differential [[id number]]  (Abnormal) Collected:  12/03/18 1749    Lab Status:  Final result Specimen:  Blood from Arm, Right Updated:  12/03/18 1813     WBC 9 10 Thousand/uL      RBC 4 89 Million/uL      Hemoglobin 13 7 g/dL      Hematocrit 42 4 %      MCV 87 fL      MCH 28 0 pg      MCHC 32 3 g/dL      RDW 13 4 %      MPV 8 3 (L) fL      Platelets 677 Thousands/uL      nRBC 0 /100 WBCs      Neutrophils Relative 59 %      Immat GRANS % 0 %      Lymphocytes Relative 28 %      Monocytes Relative 8 %      Eosinophils Relative 5 %      Basophils Relative 0 %      Neutrophils Absolute 5 29 Thousands/µL      Immature Grans Absolute 0 04 Thousand/uL      Lymphocytes Absolute 2 53 Thousands/µL      Monocytes Absolute 0 76 Thousand/µL      Eosinophils Absolute 0 45 Thousand/µL      Basophils Absolute 0 03 Thousands/µL     Basic metabolic panel [[id number]] Collected:  12/03/18 1749    Lab Status: In process Specimen:  Blood from Arm, Right Updated:  12/03/18 1809    Fingerstick Glucose (POCT) [[id number]]  (Normal) Collected:  12/03/18 1631    Lab Status:  Final result Updated:  12/03/18 1632     POC Glucose 88 mg/dl                  VAS lower limb venous duplex study, unilateral/limited    (Results Pending)         Procedures  Procedures      Phone Consults  ED Phone Contact    ED Course  ED Course as of Dec 03 1843   [facility] Dec 03, 2018   1824 Basophils Relative: 0   1840 Procedure Note: EKG  Date/Time: 12/03/18 6:40 PM   Performed by: Susy Skelton  Authorized by: Susy Skelton  ECG interpreted by me, the ED Provider: yes   The EKG demonstrates:  Rate 90  Rhythm NSR  QTc 413  No ST elevations/depressions                                  MDM  Number of Diagnoses or Management Options  Cellulitis:   Diagnosis management comments: 76-year-old male presenting with left lower extremity erythema and edema  Will obtain labs, DVT study, fluids and antibiotics  Preliminary duplex negative for blood clot  Symptoms likely secondary to cellulitis from open wounds  Admit to SOD  CritCare Time    Disposition  Final diagnoses:   Cellulitis     Time reflects when diagnosis was documented in both MDM as applicable and the Disposition within this note     Time User Action Codes Description Comment    12/3/2018  6:26  [facility] [G66 09] Cellulitis       ED Disposition     ED Disposition Condition Comment    Admit  Case was discussed with SOD and the patient's admission status was agreed to be Admission Status: observation status to the service of Dr Karlee Emerson   Follow-up Information    None         Patient's Medications   Discharge Prescriptions    No medications on file     No discharge procedures on file      ED Provider  Attending physically available and sanket Deshpande Memo    I managed the patient along with the ED Attending      Electronically Signed by         Char Holman DO  12/03/18 6205

## 2018-12-03 NOTE — PROGRESS NOTES
[facility]        NAME: Maggie Meckel  is a 63 y o  male  : 1960    MRN: [id number]  DATE: December 3, 2018  TIME: 2:06 PM    Assessment and Plan   Pain and swelling of left lower leg [M79 662, M79 89]  1  Pain and swelling of left lower leg  Transfer to other facility      Erythematous, warm lower extremity- appears to be cellulitis and Patient has diabetes  Patient aware to go immediately to the ER for further work-up and treatment as we cannot rule out DVT/blood clot as the left lower leg swelling > right lower leg    Patient Instructions     Offered ambulance  Patient declined transport by ambulance, states can drive self  Alert, oriented, capable of making  own medical decisions and understands the risks of refusing ambulance transfer  he does agree to go to the ED at [facility] for further evaluation and treatment  Chief Complaint     Chief Complaint   Patient presents with    Leg Swelling     x 2 weeks, left worse than right, left open ,draiange area  History of Present Illness       81-year-old male with past medical history of diabetes presents with bilateral lower extremity swelling times 1-2 weeks  States this last week his left calf began getting much more swollen and red than the right  States some drainage from it  Has tried using a crutch to help offset the pressure/weight  States all of this initially started as some left calf pain, but states that has improved but now swollen and red  He denies any injury to the area  Denies any fevers  States his sugars have been running 110  Denies any shortness of breath or chest pain  Denies any history of blood clots, recent surgeries, recent travel        Review of Systems   Review of Systems   Constitutional: Negative for fever  HENT: Negative for ear pain, rhinorrhea, sore throat and trouble swallowing  Eyes: Negative for itching and visual disturbance     Respiratory: Negative for cough, shortness of breath and wheezing  Cardiovascular: Positive for leg swelling  Negative for chest pain  Gastrointestinal: Negative for abdominal pain, diarrhea and vomiting  Musculoskeletal: Negative for back pain, joint swelling, neck pain and neck stiffness  Bilateral lower leg pain and swelling (left is worse than right)   Skin: Positive for wound  Neurological: Negative for dizziness, seizures, weakness, numbness and headaches  All other systems reviewed and are negative          Current Medications       Current Outpatient Prescriptions:     ACCU-CHEK SOFTCLIX LANCETS lancets, by Does not apply route, Disp: , Rfl:     ammonium lactate (AMLACTIN) 12 % lotion, Apply topically 2 (two) times a day, Disp: 400 g, Rfl: 5    Blood Glucose Monitoring Suppl (ACCU-CHEK MAYA PLUS) w/Device KIT, by Does not apply route, Disp: , Rfl:     Cholecalciferol (VITAMIN D3) 2000 units capsule, Take 1 capsule by mouth daily, Disp: , Rfl:     ferrous sulfate 325 (65 Fe) mg tablet, Take 325 mg by mouth, Disp: , Rfl:     glucose blood (ACCU-CHEK MAYA PLUS) test strip, by In Vitro route, Disp: , Rfl:     glucose blood (FREESTYLE LITE) test strip, Check FSBS daily, Disp: 100 each, Rfl: 3    lisinopril-hydrochlorothiazide (PRINZIDE,ZESTORETIC) 20-12 5 MG per tablet, TAKE 1 TABLET BY MOUTH EVERY DAY, Disp: 90 tablet, Rfl: 0    lovastatin (MEVACOR) 10 MG tablet, TAKE 1 TABLET (10 MG TOTAL) BY MOUTH DAILY AT BEDTIME, Disp: 90 tablet, Rfl: 0    metFORMIN (GLUCOPHAGE) 500 mg tablet, TAKE 1 TABLET BY MOUTH EVERY DAY, Disp: 90 tablet, Rfl: 1    pramipexole (MIRAPEX) 0 125 mg tablet, Take 1 tablet (0 125 mg total) by mouth daily, Disp: 90 tablet, Rfl: 0    Current Allergies     Allergies as of 12/03/2018    (Not on File)            The following portions of the patient's history were reviewed and updated as appropriate: allergies, current medications, past family history, past medical history, past social history, past surgical history and problem list      Past Medical History:   Diagnosis Date    Diabetes mellitus ([facility] [address] )     History of gastroesophageal reflux (GERD)     Hypertension     Last assessed: 10/24/13    Sleep apnea        Past Surgical History:   Procedure Laterality Date    AMPUTATION      of Finger, with Neurectomy and flaps       Family History   Problem Relation Age of Onset    Uterine cancer Mother     Diabetes Father     Vascular Disease Father         peripheral    Bone cancer Sister     Other Brother         severe head trauma         Medications have been verified  Objective   /87   Pulse (!) 110   Temp 98 °F (36 7 °C) (Temporal)   Resp 20   Ht 5' 5" (1 651 m)   Wt 118 kg (260 lb)   SpO2 95%   BMI 43 27 kg/m²        Physical Exam     Physical Exam   Constitutional: He is oriented to person, place, and time  He appears well-developed and well-nourished  No distress  HENT:   Head: Normocephalic and atraumatic  Mouth/Throat: Oropharynx is clear and moist    Eyes: Pupils are equal, round, and reactive to light  EOM are normal    Neck: Normal range of motion  Neck supple  Cardiovascular: Normal rate, regular rhythm and normal heart sounds  Pulmonary/Chest: Effort normal and breath sounds normal  He has no wheezes  Musculoskeletal: Normal range of motion  He exhibits edema (1+ pitting edema bilaterally)  Left lower leg: He exhibits swelling  He exhibits no bony tenderness  Legs:  Neurological: He is alert and oriented to person, place, and time  NV intact with sensation and strong peripheral pulses  5/5 strength of LE bilaterally   Skin: Skin is warm and dry  There is erythema  Psychiatric: He has a normal mood and affect  His behavior is normal    Nursing note and vitals reviewed

## 2018-12-03 NOTE — ED ATTENDING ATTESTATION
Julián Montana DO, saw and evaluated the patient  I have discussed the patient with the resident/non-physician practitioner and agree with the resident's/non-physician practitioner's findings, Plan of Care, and MDM as documented in the resident's/non-physician practitioner's note, except where noted  All available labs and Radiology studies were reviewed  At this point I agree with the current assessment done in the Emergency Department  I have conducted an independent evaluation of this patient a history and physical is as follows:    63 yom with LLE rash, swelling that started two weeks ago  ROS no fevers, CP or SOB    Past Medical History:   Diagnosis Date    Diabetes mellitus ([facility] [address] )     History of gastroesophageal reflux (GERD)     Hypertension     Last assessed: 10/24/13    Sleep apnea        /76 (BP Location: Left arm)   Pulse (!) 109   Temp 98 2 °F (36 8 °C) (Tympanic)   Resp 20   SpO2 95% '  NAD  Tachycardic   CTA  Ab soft, NT  Ext RLE normal, LLE erythematous and edematous, firm to touch, NT, good pulses   +open wounds on LLE    ECG, cbc, bmp, r/o DVT, tx for cellulitis, likely admit based on DM    Dx  Cellulitis                   Critical Care Time  CritCare Time    Procedures

## 2018-12-04 LAB
ANION GAP SERPL CALCULATED.3IONS-SCNC: 8 MMOL/L (ref 4–13)
ATRIAL RATE: 90 BPM
BASOPHILS # BLD AUTO: 0.02 THOUSANDS/ΜL (ref 0–0.1)
BASOPHILS NFR BLD AUTO: 0 % (ref 0–1)
BUN SERPL-MCNC: 10 MG/DL (ref 5–25)
CALCIUM SERPL-MCNC: 7.9 MG/DL (ref 8.3–10.1)
CHLORIDE SERPL-SCNC: 104 MMOL/L (ref 100–108)
CO2 SERPL-SCNC: 28 MMOL/L (ref 21–32)
CREAT SERPL-MCNC: 0.7 MG/DL (ref 0.6–1.3)
EOSINOPHIL # BLD AUTO: 0.41 THOUSAND/ΜL (ref 0–0.61)
EOSINOPHIL NFR BLD AUTO: 6 % (ref 0–6)
ERYTHROCYTE [DISTWIDTH] IN BLOOD BY AUTOMATED COUNT: 13.5 % (ref 11.6–15.1)
GFR SERPL CREATININE-BSD FRML MDRD: 105 ML/MIN/1.73SQ M
GLUCOSE SERPL-MCNC: 117 MG/DL (ref 65–140)
GLUCOSE SERPL-MCNC: 125 MG/DL (ref 65–140)
GLUCOSE SERPL-MCNC: 87 MG/DL (ref 65–140)
HCT VFR BLD AUTO: 38.8 % (ref 36.5–49.3)
HGB BLD-MCNC: 12.3 G/DL (ref 12–17)
IMM GRANULOCYTES # BLD AUTO: 0.04 THOUSAND/UL (ref 0–0.2)
IMM GRANULOCYTES NFR BLD AUTO: 1 % (ref 0–2)
LYMPHOCYTES # BLD AUTO: 2.03 THOUSANDS/ΜL (ref 0.6–4.47)
LYMPHOCYTES NFR BLD AUTO: 31 % (ref 14–44)
MCH RBC QN AUTO: 27.7 PG (ref 26.8–34.3)
MCHC RBC AUTO-ENTMCNC: 31.7 G/DL (ref 31.4–37.4)
MCV RBC AUTO: 87 FL (ref 82–98)
MONOCYTES # BLD AUTO: 0.68 THOUSAND/ΜL (ref 0.17–1.22)
MONOCYTES NFR BLD AUTO: 11 % (ref 4–12)
NEUTROPHILS # BLD AUTO: 3.3 THOUSANDS/ΜL (ref 1.85–7.62)
NEUTS SEG NFR BLD AUTO: 51 % (ref 43–75)
NRBC BLD AUTO-RTO: 0 /100 WBCS
P AXIS: 29 DEGREES
PLATELET # BLD AUTO: 202 THOUSANDS/UL (ref 149–390)
PMV BLD AUTO: 8.7 FL (ref 8.9–12.7)
POTASSIUM SERPL-SCNC: 3.5 MMOL/L (ref 3.5–5.3)
PR INTERVAL: 136 MS
QRS AXIS: 7 DEGREES
QRSD INTERVAL: 80 MS
QT INTERVAL: 338 MS
QTC INTERVAL: 413 MS
RBC # BLD AUTO: 4.44 MILLION/UL (ref 3.88–5.62)
SODIUM SERPL-SCNC: 140 MMOL/L (ref 136–145)
T WAVE AXIS: 29 DEGREES
VENTRICULAR RATE: 90 BPM
WBC # BLD AUTO: 6.48 THOUSAND/UL (ref 4.31–10.16)

## 2018-12-04 PROCEDURE — 85025 COMPLETE CBC W/AUTO DIFF WBC: CPT | Performed by: INTERNAL MEDICINE

## 2018-12-04 PROCEDURE — 93010 ELECTROCARDIOGRAM REPORT: CPT | Performed by: INTERNAL MEDICINE

## 2018-12-04 PROCEDURE — 87040 BLOOD CULTURE FOR BACTERIA: CPT | Performed by: INTERNAL MEDICINE

## 2018-12-04 PROCEDURE — 99225 PR SBSQ OBSERVATION CARE/DAY 25 MINUTES: CPT | Performed by: INTERNAL MEDICINE

## 2018-12-04 PROCEDURE — 82948 REAGENT STRIP/BLOOD GLUCOSE: CPT

## 2018-12-04 PROCEDURE — 80048 BASIC METABOLIC PNL TOTAL CA: CPT | Performed by: INTERNAL MEDICINE

## 2018-12-04 RX ORDER — LORATADINE 10 MG/1
10 TABLET ORAL DAILY
COMMUNITY
End: 2021-09-23 | Stop reason: ALTCHOICE

## 2018-12-04 RX ORDER — MELATONIN
2000 DAILY
Status: DISCONTINUED | OUTPATIENT
Start: 2018-12-05 | End: 2018-12-06 | Stop reason: HOSPADM

## 2018-12-04 RX ORDER — PRAMIPEXOLE DIHYDROCHLORIDE 0.25 MG/1
0.12 TABLET ORAL ONCE
Status: COMPLETED | OUTPATIENT
Start: 2018-12-04 | End: 2018-12-04

## 2018-12-04 RX ORDER — FERROUS SULFATE 325(65) MG
325 TABLET ORAL EVERY OTHER DAY
Status: DISCONTINUED | OUTPATIENT
Start: 2018-12-04 | End: 2018-12-06 | Stop reason: HOSPADM

## 2018-12-04 RX ORDER — LORATADINE 10 MG/1
10 TABLET ORAL DAILY
Status: DISCONTINUED | OUTPATIENT
Start: 2018-12-04 | End: 2018-12-06 | Stop reason: HOSPADM

## 2018-12-04 RX ORDER — FUROSEMIDE 10 MG/ML
20 INJECTION INTRAMUSCULAR; INTRAVENOUS ONCE
Status: COMPLETED | OUTPATIENT
Start: 2018-12-04 | End: 2018-12-04

## 2018-12-04 RX ORDER — LORATADINE 10 MG/1
10 TABLET ORAL DAILY
Status: DISCONTINUED | OUTPATIENT
Start: 2018-12-05 | End: 2018-12-04

## 2018-12-04 RX ADMIN — FERROUS SULFATE TAB 325 MG (65 MG ELEMENTAL FE) 325 MG: 325 (65 FE) TAB at 16:21

## 2018-12-04 RX ADMIN — SODIUM CHLORIDE 75 ML/HR: 0.9 INJECTION, SOLUTION INTRAVENOUS at 12:26

## 2018-12-04 RX ADMIN — CEFAZOLIN SODIUM 2000 MG: 2 SOLUTION INTRAVENOUS at 21:22

## 2018-12-04 RX ADMIN — LISINOPRIL: 20 TABLET ORAL at 08:48

## 2018-12-04 RX ADMIN — PRAVASTATIN SODIUM 10 MG: 10 TABLET ORAL at 16:21

## 2018-12-04 RX ADMIN — CEFAZOLIN SODIUM 2000 MG: 2 SOLUTION INTRAVENOUS at 05:51

## 2018-12-04 RX ADMIN — LORATADINE 10 MG: 10 TABLET ORAL at 16:21

## 2018-12-04 RX ADMIN — ENOXAPARIN SODIUM 40 MG: 40 INJECTION SUBCUTANEOUS at 18:50

## 2018-12-04 RX ADMIN — PRAMIPEXOLE DIHYDROCHLORIDE 0.12 MG: 0.25 TABLET ORAL at 08:47

## 2018-12-04 RX ADMIN — CEFAZOLIN SODIUM 2000 MG: 2 SOLUTION INTRAVENOUS at 13:12

## 2018-12-04 RX ADMIN — PRAMIPEXOLE DIHYDROCHLORIDE 0.12 MG: 0.25 TABLET ORAL at 23:10

## 2018-12-04 RX ADMIN — ENOXAPARIN SODIUM 40 MG: 40 INJECTION SUBCUTANEOUS at 08:49

## 2018-12-04 RX ADMIN — FUROSEMIDE 20 MG: 10 INJECTION, SOLUTION INTRAMUSCULAR; INTRAVENOUS at 10:35

## 2018-12-04 NOTE — UTILIZATION REVIEW
Initial Clinical Review    Admission: Date/Time/Statement: OBSERVATION 12/3/18 @ 1027    Orders Placed This Encounter   Procedures    Place in Observation (expected length of stay for this patient is less than two midnights)     Standing Status:   Standing     Number of Occurrences:   1     Order Specific Question:   Admitting Physician     Answer:   CARMEN HASSAN [[id number]]     Order Specific Question:   Level of Care     Answer:   Med Surg [16]     ED: Date/Time/Mode of Arrival:   ED Arrival Information     Expected Arrival Acuity Means of Arrival Escorted By Service Admission Type    12/3/2018  12/3/2018 14:31 Urgent Walk-In Self General Medicine Urgent    Arrival Complaint    Pain and swelling of left lower leg        Chief Complaint:   Chief Complaint   Patient presents with    Leg Swelling     patient reports b/l leg swelling, gradually increasing over the past 2 weeks, left worse than right, no pain     History of Illness: Adrian Salcido  is a 63 y o  male with past medical history significant of type 2 diabetes not on insulin, hypertension presented with left lower extremity swelling and redness that began about 2 weeks ago  Patient notes redness and swelling began 2 weeks ago and has progressively worsened  He also notes some localized redness that began on his anterior aspect of his right lower extremity that began approximately 2 weeks ago    Denies any fevers, chills, shortness of breath, abdominal pain, nausea, vomiting, or any other symptoms at this time       ED Vital Signs:   ED Triage Vitals [12/03/18 1450]   Temperature Pulse Respirations Blood Pressure SpO2   98 2 °F (36 8 °C) (!) 109 20 155/76 95 %      Temp Source Heart Rate Source Patient Position - Orthostatic VS BP Location FiO2 (%)   Tympanic Monitor Sitting Left arm --      Pain Score       No Pain        Wt Readings from Last 1 Encounters:   12/03/18 118 kg (260 lb)     Vital Signs (abnormal):     12/03/18 1450  98 2 °F (36 8 °C)   109  20 155/76  95 %  None (Room air)  Sitting     Abnormal Labs:    Kobi 7 9  POC glucose 88    Diagnostic Test Results:     12/3 Venous Duplex BLE - WNL     ED Treatment:   Medication Administration from 12/03/2018 1405 to 12/03/2018 1907    Date/Time Order Dose Route Action   12/03/2018 1750 sodium chloride 0 9 % bolus 1,000 mL 1,000 mL Intravenous New Bag   12/03/2018 1751 vancomycin (VANCOCIN) 2,000 mg in sodium chloride 0 9 % 500 mL IVPB 2,000 mg Intravenous New Bag        Past Medical/Surgical History: Active Ambulatory Problems     Diagnosis Date Noted    Benign essential hypertension 06/14/2012    Diabetes mellitus, type 2 ([facility] [address] ) 04/25/2016    Diffuse pain in right lower extremity 01/05/2018    Hypercholesterolemia 10/24/2013    Obesity 10/24/2013    Obstructive sleep apnea 06/01/2016    Onychomycosis 04/07/2017    Periodic limb movement sleep disorder 07/11/2017    Sleep disturbance 02/16/2016    Sleep related hypoxia 06/01/2016    Vitamin D deficiency 04/25/2016    Xerosis of skin 04/07/2017    Encounter for screening for HIV 08/02/2018     Resolved Ambulatory Problems     Diagnosis Date Noted    No Resolved Ambulatory Problems     Past Medical History:   Diagnosis Date    Diabetes mellitus ([facility] )     History of gastroesophageal reflux (GERD)     Hypertension     Sleep apnea      Admitting Diagnosis: Cellulitis [L03 90]  Leg swelling [M79 89]    Age/Sex: 63 y o  male    Assessment/Plan:   1   Left lower extremity cellulitis  Non purulent cellulitis of left lower extremity with worsening erythema per patient  Patient nontoxic, afebrile with normal white count  Will place on IV cefazolin for now  Will obtain blood cultures  Gentle IV fluids     Type 2 diabetes  A1c of 5 5 in August 2018 on home metformin  Will hold home metformin  Given A1c will hold off on sliding scale insulin and manage with diet control     Hypertension  BP control  Continue home HCTZ and lisinopril     Hyperlipidemia  Continue home statin      Code Status: Level 1 - Full Code  VTE Pharmacologic Prophylaxis: Enoxaparin (Lovenox)   VTE Mechanical Prophylaxis: sequential compression device  Admission Status: OBSERVATION     Admission Orders:  Scheduled Meds:   Current Facility-Administered Medications:  cefazolin 2,000 mg Intravenous Q8H Last Rate: 2,000 mg (12/04/18 0551)   diphenhydrAMINE 25 mg Intravenous Once    enoxaparin 40 mg Subcutaneous BID    lisinopril-hydrochlorothiazide (PRINZIDE 20/12  5) combo dose  Oral Daily    pramipexole 0 125 mg Oral Daily    pravastatin 10 mg Oral Daily With Dinner    sodium chloride 75 mL/hr Intravenous Continuous Last Rate: 75 mL/hr (12/03/18 2103)     Continuous Infusions:   sodium chloride 75 mL/hr Last Rate: 75 mL/hr (12/03/18 2103)     PRN Meds:     SCDs  OOB as lit   Diabetic diet       [address] Utilization Review Department  Phone: [phone]; Fax [phone]  [email or web address]  ATTENTION: Please call with any questions or concerns to [phone]  and carefully listen to the prompts so that you are directed to the right person  Send all requests for admission clinical reviews, approved or denied determinations and any other requests to fax [phone]   All voicemails are confidential

## 2018-12-04 NOTE — PHYSICIAN ADVISOR
Current patient class: Observation  The patient is currently on Hospital Day: 2 at [address] [place] [address]        The patient was admitted to the hospital  on N/A at N/A for the following diagnosis:  Cellulitis [L03 90]  Leg swelling [M79 89]     After review of the relevant documentation, labs, vital signs and test results, the patient is most appropriate for OBSERVATION STATUS  Rationale is as follows: The patient is a 63 yrs   Male who presented to the ED at 12/3/2018  3:43 PM with a chief complaint of Leg Swelling (patient reports b/l leg swelling, gradually increasing over the past 2 weeks, left worse than right, no pain)     Patient presented with left lower extremity redness and swelling  The patient is being admitted with left lower extremity cellulitis  The plan of care includes intravenous Ancef, IV fluids, blood cultures, repeat labs  The patient is on intravenous antibiotics however has remained hemodynamically stable no evidence of fever and white cell count is 6 4  This patient is appropriate for observation status  Chart notes reviewed      The patients vitals on arrival were ED Triage Vitals [12/03/18 1450]   Temperature Pulse Respirations Blood Pressure SpO2   98 2 °F (36 8 °C) (!) 109 20 155/76 95 %      Temp Source Heart Rate Source Patient Position - Orthostatic VS BP Location FiO2 (%)   Tympanic Monitor Sitting Left arm --      Pain Score       No Pain           Past Medical History:   Diagnosis Date    Diabetes mellitus ([address] )     History of gastroesophageal reflux (GERD)     Hypertension     Last assessed: 10/24/13    Sleep apnea      Past Surgical History:   Procedure Laterality Date    AMPUTATION      of Finger, with Neurectomy and flaps           Consults have been placed to:   IP CONSULT TO CASE MANAGEMENT    Vitals:    12/03/18 2253 12/04/18 0815 12/04/18 1311 12/04/18 1504   BP: 156/78 121/77  161/74   BP Location: Right arm Left arm  Left arm Pulse: 96 78  90   Resp: 18 18 18   Temp: 98 5 °F (36 9 °C) 97 8 °F (36 6 °C)  97 8 °F (36 6 °C)   TempSrc: Oral Oral  Oral   SpO2: 95% 96%  96%   Weight:   118 kg (259 lb 12 8 oz)    Height:   5' 5" (1 651 m)        Most recent labs:    Recent Labs      12/04/18   0236  12/04/18   0257   WBC   --   6 48   HGB   --   12 3   HCT   --   38 8   PLT   --   202   K  3 5   --    CALCIUM  7 9*   --    BUN  10   --    CREATININE  0 70   --        Scheduled Meds:  Current Facility-Administered Medications:  cefazolin 2,000 mg Intravenous Q8H Bro Hernandez MD Last Rate: Stopped (12/04/18 1350)   [START ON 12/5/2018] cholecalciferol 2,000 Units Oral Daily Salina Jordan MD    diphenhydrAMINE 25 mg Intravenous Once Yuri Bradley DO    enoxaparin 40 mg Subcutaneous BID Bro Hernandez MD    ferrous sulfate 325 mg Oral Every Other Day Salina Jordan MD    lisinopril-hydrochlorothiazide (PRINZIDE 20/12  5) combo dose  Oral Daily Bro Hernandez MD    loratadine 10 mg Oral Daily Salina Jordan MD    pramipexole 0 125 mg Oral Daily Bro Hernandez MD    pravastatin 10 mg Oral Daily With Dulce Quinones MD      Continuous Infusions:   PRN Meds:      Surgical procedures (if appropriate):

## 2018-12-04 NOTE — PROGRESS NOTES
IM Residency Progress Note   Unit/Bed#: [facility] 811-01 Encounter: [id number]  SOD Team A      Nikki Segura  63 y o  male [id number]    Hospital Stay Days: 0      Assessment/Plan:    Principal Problem:    Cellulitis  Active Problems:    Benign essential hypertension    Diabetes mellitus, type 2 (HCC)    Hypercholesterolemia    1  Left lower extremity cellulitis  Present on admission  Patient with left lower extremity swelling, tightness, warmth, mild discharge  Likely source excoriation secondary to chronic dry skin of lower extremities  Left lower extremity neurologically intact with 2+ DP  Patient currently on IV Ancef  Patient hemodynamically stable  Afebrile  · Will continue IV Ancef  · Will demarcate area of erythema and monitor  · Will continue to monitor for signs of compartment syndrome given circumferential cellulitis  · Will order 1 time dose of IV Lasix to decreased lower extremity swelling and increase perfusion of antibiotics  · Will continue to monitor hemodynamically, clinically, trend fever curve, WBC, renal function    2  Diabetes mellitus  Most recent A1c of 5 5  Patient is on metformin at  · Will hold home metformin  · Given A1c, will hold off on sliding scale insulin at this time, will manage with diet control    3  Hypertension  Patient on lisinopril/HCTZ at home  · Will continue home regimen  · Will monitor hemodynamically    4  Hyperlipidemia  Patient takes lovastatin 10 mg daily at home  · Will continue pravastatin 10 mg daily as lovastatin is non formulary    Disposition:  Continue inpatient care for IV antibiotics  Subjective:   Patient examined at bedside  Patient refers left leg swelling and redness for 2 weeks  Patient refers bilateral lower extremity swelling worse on the left  Patient states cellulitis started as an excoriation secondary to NH of his left lower extremity    Patient states that the area of erythema started about 2 weeks ago and has since grown after the entire half with left lower extremity  Denies any pain, decreased sensation in the left lower extremity, or weakness  Patient denies a history of cellulitis or DVT  Patient denies cough, fever, chills, night sweats, abdominal pain, diarrhea, URI symptoms  All other review of systems negative  Vitals: Temp (24hrs), Av 1 °F (36 7 °C), Min:97 8 °F (36 6 °C), Max:98 5 °F (36 9 °C)  Current: Temperature: 97 8 °F (36 6 °C)  Vitals:    18 1450 18 1723 18 2253 18 0815   BP: 155/76 127/71 156/78 121/77   BP Location: Left arm Right arm Right arm Left arm   Pulse: (!) 109 105 96 78   Resp:    Temp: 98 2 °F (36 8 °C)  98 5 °F (36 9 °C) 97 8 °F (36 6 °C)   TempSrc: Tympanic  Oral Oral   SpO2: 95% 95% 95% 96%    There is no height or weight on file to calculate BMI  I/O last 24 hours: In: 12 [P O :960]  Out: 0       Physical Exam   Constitutional: He is oriented to person, place, and time  He appears well-developed and well-nourished  No distress  HENT:   Head: Normocephalic and atraumatic  Mouth/Throat: No oropharyngeal exudate  Eyes: Conjunctivae are normal  No scleral icterus  Neck: Normal range of motion  Neck supple  No thyromegaly present  Cardiovascular: Normal rate, regular rhythm, normal heart sounds and intact distal pulses  Exam reveals no gallop  No murmur heard  DP pulses 2+ in the lower extremities bilaterally   Pulmonary/Chest: Effort normal and breath sounds normal  No respiratory distress  He has no wheezes  Abdominal: Soft  Bowel sounds are normal  He exhibits no distension  There is no tenderness  Musculoskeletal: Normal range of motion  He exhibits edema (Lower extremities bilaterally, nonpitting, worse on left) and deformity (Multiple excoriations of bilateral lower extremities, circumferential erythema and warm lower half of left lower extremity, now demarcated)  He exhibits no tenderness     Lymphadenopathy:     He has no cervical adenopathy  Neurological: He is alert and oriented to person, place, and time  No cranial nerve deficit  He exhibits normal muscle tone  Feet neurologically intact bilaterally   Skin: Skin is warm and dry  No rash noted  He is not diaphoretic  No pallor  Psychiatric: He has a normal mood and affect  His behavior is normal    Nursing note and vitals reviewed          Invasive Devices     Peripheral Intravenous Line            Peripheral IV 12/03/18 Right Antecubital 1 day                          Labs:   Recent Results (from the past 24 hour(s))   Fingerstick Glucose (POCT)    Collection Time: 12/03/18  4:31 PM   Result Value Ref Range    POC Glucose 88 65 - 140 mg/dl   CBC and differential    Collection Time: 12/03/18  5:49 PM   Result Value Ref Range    WBC 9 10 4 31 - 10 16 Thousand/uL    RBC 4 89 3 88 - 5 62 Million/uL    Hemoglobin 13 7 12 0 - 17 0 g/dL    Hematocrit 42 4 36 5 - 49 3 %    MCV 87 82 - 98 fL    MCH 28 0 26 8 - 34 3 pg    MCHC 32 3 31 4 - 37 4 g/dL    RDW 13 4 11 6 - 15 1 %    MPV 8 3 (L) 8 9 - 12 7 fL    Platelets 863 536 - 980 Thousands/uL    nRBC 0 /100 WBCs    Neutrophils Relative 59 43 - 75 %    Immat GRANS % 0 0 - 2 %    Lymphocytes Relative 28 14 - 44 %    Monocytes Relative 8 4 - 12 %    Eosinophils Relative 5 0 - 6 %    Basophils Relative 0 0 - 1 %    Neutrophils Absolute 5 29 1 85 - 7 62 Thousands/µL    Immature Grans Absolute 0 04 0 00 - 0 20 Thousand/uL    Lymphocytes Absolute 2 53 0 60 - 4 47 Thousands/µL    Monocytes Absolute 0 76 0 17 - 1 22 Thousand/µL    Eosinophils Absolute 0 45 0 00 - 0 61 Thousand/µL    Basophils Absolute 0 03 0 00 - 0 10 Thousands/µL   Basic metabolic panel    Collection Time: 12/03/18  5:49 PM   Result Value Ref Range    Sodium 138 136 - 145 mmol/L    Potassium 3 7 3 5 - 5 3 mmol/L    Chloride 103 100 - 108 mmol/L    CO2 30 21 - 32 mmol/L    ANION GAP 5 4 - 13 mmol/L    BUN 12 5 - 25 mg/dL    Creatinine 0 75 0 60 - 1 30 mg/dL    Glucose 88 65 - 140 mg/dL    Calcium 8 7 8 3 - 10 1 mg/dL    eGFR 102 ml/min/1 73sq m   Basic metabolic panel    Collection Time: 12/04/18  2:36 AM   Result Value Ref Range    Sodium 140 136 - 145 mmol/L    Potassium 3 5 3 5 - 5 3 mmol/L    Chloride 104 100 - 108 mmol/L    CO2 28 21 - 32 mmol/L    ANION GAP 8 4 - 13 mmol/L    BUN 10 5 - 25 mg/dL    Creatinine 0 70 0 60 - 1 30 mg/dL    Glucose 117 65 - 140 mg/dL    Calcium 7 9 (L) 8 3 - 10 1 mg/dL    eGFR 105 ml/min/1 73sq m   CBC and differential    Collection Time: 12/04/18  2:57 AM   Result Value Ref Range    WBC 6 48 4 31 - 10 16 Thousand/uL    RBC 4 44 3 88 - 5 62 Million/uL    Hemoglobin 12 3 12 0 - 17 0 g/dL    Hematocrit 38 8 36 5 - 49 3 %    MCV 87 82 - 98 fL    MCH 27 7 26 8 - 34 3 pg    MCHC 31 7 31 4 - 37 4 g/dL    RDW 13 5 11 6 - 15 1 %    MPV 8 7 (L) 8 9 - 12 7 fL    Platelets 169 926 - 620 Thousands/uL    nRBC 0 /100 WBCs    Neutrophils Relative 51 43 - 75 %    Immat GRANS % 1 0 - 2 %    Lymphocytes Relative 31 14 - 44 %    Monocytes Relative 11 4 - 12 %    Eosinophils Relative 6 0 - 6 %    Basophils Relative 0 0 - 1 %    Neutrophils Absolute 3 30 1 85 - 7 62 Thousands/µL    Immature Grans Absolute 0 04 0 00 - 0 20 Thousand/uL    Lymphocytes Absolute 2 03 0 60 - 4 47 Thousands/µL    Monocytes Absolute 0 68 0 17 - 1 22 Thousand/µL    Eosinophils Absolute 0 41 0 00 - 0 61 Thousand/µL    Basophils Absolute 0 02 0 00 - 0 10 Thousands/µL       Radiology Results: I have personally reviewed pertinent reports  Other Diagnostic Testing:   I have personally reviewed pertinent reports  Active Meds:   Current Facility-Administered Medications   Medication Dose Route Frequency    ceFAZolin (ANCEF) IVPB (premix) 2,000 mg  2,000 mg Intravenous Q8H    diphenhydrAMINE (BENADRYL) injection 25 mg  25 mg Intravenous Once    enoxaparin (LOVENOX) subcutaneous injection 40 mg  40 mg Subcutaneous BID    lisinopril-hydrochlorothiazide (PRINZIDE 20/12  5) combo dose   Oral Daily    pramipexole (MIRAPEX) tablet 0 125 mg  0 125 mg Oral Daily    pravastatin (PRAVACHOL) tablet 10 mg  10 mg Oral Daily With Dinner    sodium chloride 0 9 % infusion  75 mL/hr Intravenous Continuous         VTE Pharmacologic Prophylaxis: Enoxaparin (Lovenox)  VTE Mechanical Prophylaxis: sequential compression device    Gautam Coon MD

## 2018-12-04 NOTE — H&P
INTERNAL MEDICINE HISTORY AND PHYSICAL  [facility] 811-01 SOD Team A    NAME: Tara Nixon  AGE: 63 y o  SEX: male  : 1960   MRN: [id number]  ENCOUNTER: [id number]    DATE: 12/3/2018  TIME: 8:45 PM    Primary Care Physician: Alyssa Dandy, CRNP  Admitting Provider: Ervin Crowley MD    Chief complaint:  Left lower extremity redness and swelling    History of Present Illness     Tara Nixon  is a 63 y o  male with past medical history significant of type 2 diabetes not on insulin, hypertension presented with left lower extremity swelling and redness that began about 2 weeks ago  Patient notes redness and swelling began 2 weeks ago and has progressively worsened  He also notes some localized redness that began on his anterior aspect of his right lower extremity that began approximately 2 weeks ago  Denies any fevers, chills, shortness of breath, abdominal pain, nausea, vomiting, or any other symptoms at this time  Review of Systems   Review of Systems   Constitutional: Negative for appetite change, chills, diaphoresis, fatigue, fever and unexpected weight change  HENT: Negative for congestion, rhinorrhea and sore throat  Eyes: Negative for photophobia and visual disturbance  Respiratory: Negative for cough, shortness of breath and wheezing  Cardiovascular: Negative for chest pain, palpitations and leg swelling  Gastrointestinal: Negative for abdominal pain, anal bleeding, blood in stool, constipation, diarrhea, nausea and vomiting  Genitourinary: Negative for decreased urine volume, difficulty urinating, dysuria, flank pain, frequency, hematuria and urgency  Musculoskeletal: Negative for arthralgias, back pain, joint swelling and myalgias  Skin: Positive for color change and wound  Negative for rash  Neurological: Negative for dizziness, seizures, facial asymmetry, speech difficulty, numbness and headaches     Psychiatric/Behavioral: Negative for agitation, confusion and decreased concentration  The patient is not nervous/anxious  Past Medical History     Past Medical History:   Diagnosis Date    Diabetes mellitus ([address] )     History of gastroesophageal reflux (GERD)     Hypertension     Last assessed: 10/24/13    Sleep apnea        Past Surgical History     Past Surgical History:   Procedure Laterality Date    AMPUTATION      of Finger, with Neurectomy and flaps       Social History     History   Alcohol Use No     History   Drug Use No     History   Smoking Status    Former Smoker    Quit date: 2000   Smokeless Tobacco    Never Used       Family History     Family History   Problem Relation Age of Onset    Uterine cancer Mother     Diabetes Father     Vascular Disease Father         peripheral    Bone cancer Sister     Other Brother         severe head trauma       Medications Prior to Admission     Prior to Admission medications    Medication Sig Start Date End Date Taking?  Authorizing Provider   ammonium lactate (AMLACTIN) 12 % lotion Apply topically 2 (two) times a day 8/16/18  Yes AL Tang   lisinopril-hydrochlorothiazide (PRINZIDE,ZESTORETIC) 20-12 5 MG per tablet TAKE 1 TABLET BY MOUTH EVERY DAY 10/8/18  Yes Johanna Coker PA-C   lovastatin (MEVACOR) 10 MG tablet TAKE 1 TABLET (10 MG TOTAL) BY MOUTH DAILY AT BEDTIME  Patient taking differently: Take 10 mg by mouth daily   10/20/18  Yes AL Tang   metFORMIN (GLUCOPHAGE) 500 mg tablet TAKE 1 TABLET BY MOUTH EVERY DAY 10/2/18  Yes AL Tang   pramipexole (MIRAPEX) 0 125 mg tablet Take 1 tablet (0 125 mg total) by mouth daily 10/4/18  Yes AL Tang   Cholecalciferol (VITAMIN D3) 2000 units capsule Take 1 capsule by mouth daily 4/25/16   Historical Provider, MD   ferrous sulfate 325 (65 Fe) mg tablet Take 325 mg by mouth    Historical Provider, MD   ACCU-CHEK SOFTCLIX LANCETS lancets by Does not apply route 3/8/17 12/3/18  Historical Provider, MD   Blood Glucose Monitoring Suppl (ACCU-CHEK MAYA PLUS) w/Device KIT by Does not apply route 3/8/17 12/3/18  Historical Provider, MD   glucose blood (ACCU-CHEK MAYA PLUS) test strip by In Vitro route 3/8/17 12/3/18  Historical Provider, MD   glucose blood (FREESTYLE LITE) test strip Check FSBS daily 9/19/18 12/3/18  Classie Rast, CRNP       Allergies   No Known Allergies    Objective     Vitals:    12/03/18 1450 12/03/18 1723   BP: 155/76 127/71   BP Location: Left arm Right arm   Pulse: (!) 109 105   Resp: 20 18   Temp: 98 2 °F (36 8 °C)    TempSrc: Tympanic    SpO2: 95% 95%     There is no height or weight on file to calculate BMI  Intake/Output Summary (Last 24 hours) at 12/03/18 2045  Last data filed at 12/03/18 1958   Gross per 24 hour   Intake              960 ml   Output                0 ml   Net              960 ml     Invasive Devices          No matching active lines, drains, or airways          Physical Exam  GENERAL: Appears well-developed and well-nourished  Appears in no acute distress   HEENT: Normocephalic and atraumatic  No scleral icterus  PERRLA  EOMI B/L  No oropharyngeal edema  MM moist    NECK: Neck supple with no lymphadenopathy  Trachea midline  No JVD  CARDIOVASCULAR: S1 and S2 are present  Regular rate and rhythm  No murmurs, rubs, or gallops  RESPIRATORY: CTA B/L, no rales, rhonci or wheezes  Normal respiratory expansion  ABDOMINAL: Bowel sounds present in all 4 quadrants, non-tender, soft, non-distended  No organomegaly, rebound, or guarding  EXTREMITIES: 2+ DP and PT pulses bilaterally; no cyanosis, clubbing, edema  ROM intact  LIND x4   MUSCULOSKELETAL: No joint tenderness, deformity or swelling, full range of motion without pain  NEUROLOGIC: Patient is alert and oriented to person, place, and time  No sensory or motor deficits  CN 2-12 intact  Plantars downgoing bilaterally  Speech fluent  SKIN: Noted venous stasis dermatitis who bilaterally on anterior aspects of both lower extremities    Noted swelling and erythema of right lower extremity and warmth  Localized erythema and swelling of right lower extremity noted as well  No signs of purulence or abscess formation   PSYCHIATRIC: Normal mood and affect     Lab Results: I have personally reviewed pertinent reports  CBC:     Results from last 7 days  Lab Units 12/03/18  1749   WBC Thousand/uL 9 10   RBC Million/uL 4 89   HEMOGLOBIN g/dL 13 7   HEMATOCRIT % 42 4   MCV fL 87   MCH pg 28 0   MCHC g/dL 32 3   RDW % 13 4   MPV fL 8 3*   PLATELETS Thousands/uL 238   NRBC AUTO /100 WBCs 0   NEUTROS PCT % 59   LYMPHS PCT % 28   MONOS PCT % 8   EOS PCT % 5   BASOS PCT % 0   NEUTROS ABS Thousands/µL 5 29   LYMPHS ABS Thousands/µL 2 53   MONOS ABS Thousand/µL 0 76   EOS ABS Thousand/µL 0 45   , Chemistry Profile:     Results from last 7 days  Lab Units 12/03/18  1749   POTASSIUM mmol/L 3 7   CHLORIDE mmol/L 103   CO2 mmol/L 30   BUN mg/dL 12   CREATININE mg/dL 0 75   CALCIUM mg/dL 8 7   EGFR ml/min/1 73sq m 102   , Coagulation Studies:       Imaging: I have personally reviewed pertinent films in PACS  Vas Lower Limb Venous Duplex Study, Unilateral/limited    Result Date: 12/3/2018  Narrative:  [facility] REPORT CLINICAL: Indications: Patient presents with left lower extremity edema x 2 weeks  Operative History: No cardiovascular surgeries noted  Risk Factors The patient has history of HTN and Diabetes (Yes)  The patient current BMI is 43 26, Weight is 260 lb and height is 65 in  FINDINGS:  Segment  Right            Left              Impression       Impression       CFV      Normal (Patent)  Normal (Patent)     CONCLUSION: RIGHT LOWER LIMB LIMITED: Evaluation shows no evidence of thrombus in the common femoral vein  Doppler evaluation shows a normal response to augmentation maneuvers  LEFT LOWER LIMB: No evidence of acute or chronic deep vein thrombosis No evidence of superficial thrombophlebitis noted   Doppler evaluation shows a normal response to augmentation maneuvers  Popliteal, posterior tibial and anterior tibial arterial Doppler waveforms are triphasic  Technical findings were given to Dr Rosi Lara in the ED  Microbiology: cultures obtained in emergency department include     Urinalysis:       Invalid input(s): URIBILINOGEN     Urine Micro:        EKG, Pathology, and Other Studies: I have personally reviewed pertinent reports  Medications given in Emergency Department     Medication Administration - last 24 hours from 12/02/2018 2045 to 12/03/2018 2045       Date/Time Order Dose Route Action Action by     12/03/2018 1750 sodium chloride 0 9 % bolus 1,000 mL 1,000 mL Intravenous [address] Zenobia Terrazas RN     12/03/2018 1751 vancomycin (VANCOCIN) 2,000 mg in sodium chloride 0 9 % 500 mL IVPB 2,000 mg Intravenous New Bag Zenobia Terrazas RN          Assessment and Plan     Problem List     * (Principal)Cellulitis    Benign essential hypertension    Diabetes mellitus, type 2 ([facility] [address] )    Overview Signed 8/2/2018  9:32 AM by Brie Gramajo     Transitioned From: Impaired fasting glucose         Lab Results   Component Value Date    HGBA1C 5 5 08/02/2018       Recent Labs      12/03/18   1631   POCGLU  88       Blood Sugar Average: Last 72 hrs:  (P) 88        Diffuse pain in right lower extremity    Hypercholesterolemia    Obesity    Obstructive sleep apnea    Overview Signed 8/2/2018  9:32 AM by AL Connelly     Impression - [id number] Violeta Organ: had sleep study done 5/2016  + IGNACIA and hypoxia (down to 69%)   ; Impression - [id number] Violeta Organ: severe IGNACIA noted  see full report ; Impression - [id number] Violeta Organ: 7/11/17 pt had sleep study f/u done for titration of CPAP- on 8cm water pressure- see full note  also noted SEVERE PLMS   LC         Onychomycosis    Periodic limb movement sleep disorder    Overview Signed 8/2/2018  9:32 AM by AL Connelly     Impression - [id number] Violeta Organ: SEVERE PLMS noted during f/u sleep study CPAP titration done 7/2017- see full note  ? pharmacotherapy by sleep lab??  LC         Sleep disturbance    Sleep related hypoxia    Vitamin D deficiency    Xerosis of skin    Encounter for screening for HIV        1  Left lower extremity cellulitis  Non purulent cellulitis of left lower extremity with worsening erythema per patient  Patient nontoxic, afebrile with normal white count  Will place on IV cefazolin for now  Will obtain blood cultures  Gentle IV fluids    Type 2 diabetes  A1c of 5 5 in August 2018 on home metformin  Will hold home metformin  Given A1c will hold off on sliding scale insulin and manage with diet control    Hypertension  BP control  Continue home HCTZ and lisinopril    Hyperlipidemia  Continue home statin      Code Status: Level 1 - Full Code  VTE Pharmacologic Prophylaxis: Enoxaparin (Lovenox)   VTE Mechanical Prophylaxis: sequential compression device  Admission Status: OBSERVATION    Admission Time  I spent 30 minutes admitting the patient  This involved direct patient contact where I performed a full history and physical, reviewing previous records, and reviewing laboratory and other diagnostic studies      Sharmila Thapa MD  Internal Medicine  PGY-2

## 2018-12-04 NOTE — SOCIAL WORK
Cm met with patient to explain Cm role and discuss discharge planning  Patient lives with his brother in 2 story home with 1STE and bedroom on 2nd floor  Patient's other brother Delroy Clement is his emergency contact but patient would not provide patient's brother's phone number  Patient reported being independent with ADLs PTA and uses crutches  Patient has no other DME in the home  Patient denied SNF, VNA, mental health or ETOH treatment in the past   Pharmacy: [facility] and is requesting meds be filled at [facility]   PCP: Dr Graciela Shaffer  Patient drives and is not working at this time  Patient stated one of his brothers is able to transport or stated he will take the bus  Cm following  CM reviewed d/c planning process including the following: identifying help at home, patient preference for d/c planning needs, Discharge Lounge, [facility] Meds to Bed program, availability of treatment team to discuss questions or concerns patient and/or family may have regarding understanding medications and recognizing signs and symptoms once discharged  CM also encouraged patient to follow up with all recommended appointments after discharge  Patient advised of importance for patient and family to participate in managing patients medical well being

## 2018-12-04 NOTE — MEDICAL STUDENT
Progress Note - Stevo Higginbotham  63 y o  male MRN: [id number]    Unit/Bed#: [facility] 811-01 Encounter: [id number]      Assessment & Plan:   1) Redness and swelling of the left lower limb  Likely due to secondary infection from open wounds in stasis dermatitis as there is skin discoloration, healing ulcers, and bilateral ankle edema  Unlikely due to DVT as venous duplex studies were negative  No signs of sepsis as CBC negative for leukocytosis and patient has been afebrile  - Treat as cellulitis with IV cefazolin 2000mg Q8H   - Blood cultures pending  - Monitor the margins of redness daily   - Topical antihistamine prn for itching   - Continue to moisturize skin   - Keep wounds clean and dry with daily dressing changes  - Consult podiatry for wound care     2) Type 2 diabetes  Well controlled on current medications  Last A1c was 5 5% in August 2018  - Continue home metformin 500mg PO daily     3) Hypertension  Well controlled on current medications with systolic BP in 728-149E and diastolic in 38'W  - Continue home Prinzide 20/12 5 PO daily     4) Hyperlipidemia  Well controlled on current medications  Last lipid panel showed LDL 87 and total cholesterol 169    - Continue home lovastatin 10mg PO daily    5) Restless leg syndrome    - Continue home pramipexole 0 125mg PO nightly    6) IGNACIA in setting of obesity    - Lifestyle and diet modifications for weight loss    Subjective: This is a 59yo male with PMH significant for T2DM, HTN, and hypercholesterolemia presenting with a two week history of left lower extremity redness and swelling that has gotten progressively worse  The patient states that this has not happened before  He says that he has had chronic itching in bilateral lower extremities, and will often scratch enough to cause some open wounds  He denies other trauma to the region, as well as pain and burning sensations   He also denies any difficulty walking, leg claudication, fevers, chills, and peripheral neuropathy  At home, the patient applied topical neosporin ointment to the wounds on his leg, but did not notice improvement in the swelling or redness  In the hospital, he received 2000mg of IV cefazolin with little improvement overnight  He does state that the skin moisturizer is helping with the pruritus  Objective:     Vitals: Blood pressure 121/77, pulse 78, temperature 97 8 °F (36 6 °C), temperature source Oral, resp  rate 18, SpO2 96 %  ,There is no height or weight on file to calculate BMI  Intake/Output Summary (Last 24 hours) at 12/04/18 1047  Last data filed at 12/04/18 0321   Gross per 24 hour   Intake              960 ml   Output                0 ml   Net              960 ml       Physical Exam:  General: Comfortably seated in chair  Appears as stated age  Head: Normocephalic and atraumatic  Eyes: PRRL  EOMI bilaterally  Neck: No palpable lymphadenopathy or masses  Cardiovascular: S1+S2 present  Regular rate and rhythm  No murmurs, rubs, or gallops  Peripheral pulses 2+ in UE, and 1+ in LE  Cap refill 2 sec  Lungs: CTA bilaterally  Abdomen: Non-tender  Non-distended  Bowel sounds present  Extremities: Erythema over the left leg with swelling  Evidence of healing excoriations bilaterally  Non-tender, no drainage  No palpable crepitus         Invasive Devices     Peripheral Intravenous Line            Peripheral IV 12/03/18 Right Antecubital 1 day                Lab, Imaging and other studies:   Lab Results   Component Value Date    WBC 6 48 12/04/2018    HGB 12 3 12/04/2018    HCT 38 8 12/04/2018    MCV 87 12/04/2018     12/04/2018     Lab Results   Component Value Date     12/05/2013    K 3 5 12/04/2018     12/04/2018    CO2 28 12/04/2018    ANIONGAP 9 12/05/2013    BUN 10 12/04/2018    CREATININE 0 70 12/04/2018    GLUCOSE 99 12/05/2013    CALCIUM 7 9 (L) 12/04/2018    AST 18 03/08/2017    ALT 27 03/08/2017    ALKPHOS 69 03/08/2017    PROT 7 3 12/05/2013 BILITOT 0 36 12/05/2013    EGFR 105 12/04/2018     Lab Results   Component Value Date    HGBA1C 5 5 08/02/2018     Lab Results   Component Value Date    CHOL 222 12/05/2013     Lab Results   Component Value Date    HDL 55 03/08/2017    HDL 38 (L) 04/22/2016    HDL 36 12/05/2013     Lab Results   Component Value Date    LDLCALC 87 03/08/2017    LDLCALC 73 04/22/2016    LDLCALC 143 12/05/2013     Lab Results   Component Value Date    TRIG 136 03/08/2017    TRIG 192 (H) 04/22/2016    TRIG 216 12/05/2013     No results found for: CHOLHDL

## 2018-12-05 LAB
ANION GAP SERPL CALCULATED.3IONS-SCNC: 6 MMOL/L (ref 4–13)
BUN SERPL-MCNC: 12 MG/DL (ref 5–25)
CALCIUM SERPL-MCNC: 9.3 MG/DL (ref 8.3–10.1)
CHLORIDE SERPL-SCNC: 103 MMOL/L (ref 100–108)
CO2 SERPL-SCNC: 29 MMOL/L (ref 21–32)
CREAT SERPL-MCNC: 0.77 MG/DL (ref 0.6–1.3)
ERYTHROCYTE [DISTWIDTH] IN BLOOD BY AUTOMATED COUNT: 13.5 % (ref 11.6–15.1)
GFR SERPL CREATININE-BSD FRML MDRD: 101 ML/MIN/1.73SQ M
GLUCOSE SERPL-MCNC: 104 MG/DL (ref 65–140)
HCT VFR BLD AUTO: 40.3 % (ref 36.5–49.3)
HGB BLD-MCNC: 12.8 G/DL (ref 12–17)
MCH RBC QN AUTO: 28 PG (ref 26.8–34.3)
MCHC RBC AUTO-ENTMCNC: 31.8 G/DL (ref 31.4–37.4)
MCV RBC AUTO: 88 FL (ref 82–98)
PLATELET # BLD AUTO: 197 THOUSANDS/UL (ref 149–390)
PMV BLD AUTO: 8.4 FL (ref 8.9–12.7)
POTASSIUM SERPL-SCNC: 3.8 MMOL/L (ref 3.5–5.3)
RBC # BLD AUTO: 4.57 MILLION/UL (ref 3.88–5.62)
SODIUM SERPL-SCNC: 138 MMOL/L (ref 136–145)
WBC # BLD AUTO: 5.95 THOUSAND/UL (ref 4.31–10.16)

## 2018-12-05 PROCEDURE — 99225 PR SBSQ OBSERVATION CARE/DAY 25 MINUTES: CPT | Performed by: INTERNAL MEDICINE

## 2018-12-05 PROCEDURE — 80048 BASIC METABOLIC PNL TOTAL CA: CPT | Performed by: STUDENT IN AN ORGANIZED HEALTH CARE EDUCATION/TRAINING PROGRAM

## 2018-12-05 PROCEDURE — 85027 COMPLETE CBC AUTOMATED: CPT | Performed by: STUDENT IN AN ORGANIZED HEALTH CARE EDUCATION/TRAINING PROGRAM

## 2018-12-05 RX ADMIN — VITAMIN D, TAB 1000IU (100/BT) 2000 UNITS: 25 TAB at 08:14

## 2018-12-05 RX ADMIN — PRAMIPEXOLE DIHYDROCHLORIDE 0.12 MG: 0.25 TABLET ORAL at 22:21

## 2018-12-05 RX ADMIN — CEFAZOLIN SODIUM 2000 MG: 2 SOLUTION INTRAVENOUS at 21:34

## 2018-12-05 RX ADMIN — LORATADINE 10 MG: 10 TABLET ORAL at 08:14

## 2018-12-05 RX ADMIN — ENOXAPARIN SODIUM 40 MG: 40 INJECTION SUBCUTANEOUS at 08:15

## 2018-12-05 RX ADMIN — CEFAZOLIN SODIUM 2000 MG: 2 SOLUTION INTRAVENOUS at 13:33

## 2018-12-05 RX ADMIN — LISINOPRIL: 20 TABLET ORAL at 08:14

## 2018-12-05 RX ADMIN — CEFAZOLIN SODIUM 2000 MG: 2 SOLUTION INTRAVENOUS at 06:22

## 2018-12-05 RX ADMIN — ENOXAPARIN SODIUM 40 MG: 40 INJECTION SUBCUTANEOUS at 17:35

## 2018-12-05 RX ADMIN — PRAVASTATIN SODIUM 10 MG: 10 TABLET ORAL at 16:39

## 2018-12-05 NOTE — UTILIZATION REVIEW
[address]  Utilization Review Department  Phone: [phone]; Fax [phone]  [email or web address]  ATTENTION: Please call with any questions or concerns to [phone]  and carefully listen to the prompts so that you are directed to the right person  Send all requests for admission clinical reviews, approved or denied determinations and any other requests to fax [phone]  All voicemails are confidential   Continued Stay Review    Date: 12/5/18    Vital Signs: /65 (BP Location: Left arm)   Pulse 80   Temp 97 7 °F (36 5 °C) (Oral)   Resp 20   Ht 5' 5" (1 651 m)   Wt 118 kg (259 lb 12 8 oz)   SpO2 97%   BMI 43 23 kg/m²     Medications:   Scheduled Meds:   Current Facility-Administered Medications:  cefazolin 2,000 mg Intravenous Q8H Bro Hernandez MD Last Rate: 2,000 mg (12/05/18 0622)   cholecalciferol 2,000 Units Oral Daily Sadie Mendosa MD    diphenhydrAMINE 25 mg Intravenous Once Yuri Banai, DO    enoxaparin 40 mg Subcutaneous BID Bro Hernandez MD    ferrous sulfate 325 mg Oral Every Other Day Sadie Mendosa MD    lisinopril-hydrochlorothiazide (PRINZIDE 20/12  5) combo dose  Oral Daily Bro Hernandez MD    loratadine 10 mg Oral Daily Sadie Mendosa MD    pramipexole 0 125 mg Oral Daily Bro Hernandez MD    pravastatin 10 mg Oral Daily With Ronak Anderson MD      Continuous Infusions:    PRN Meds:     Abnormal Labs/Diagnostic Results:     Age/Sex: 63 y o  male     Assessment/Plan: 62 yo male with persistent cellulitis, decreased swelling and erythema  Continue IV ancef, will change to po antibiotic, low salt diet, elevate lower extremities       Discharge Plan: TBD

## 2018-12-05 NOTE — SOCIAL WORK
Cm reviewed patient during care coordination rounds with Dr Nito Parrish  Patient not stable for discharge today but anticipated for possible discharge tomorrow  Medical team monitoring patient cellulitis and leg swelling  Patient to discharge home once stable  Patient's brother to transport or patient reported he will take the bus  Cm following

## 2018-12-05 NOTE — MEDICAL STUDENT
Progress Note - Karla New  63 y o  male MRN: [id number]    Unit/Bed#: [facility] 811-01 Encounter: [id number]      Assessment & Plan:   1) Redness and swelling of the left lower limb  Likely due to secondary infection from open wounds caused by itching from LE edema  Unlikely due to DVT as venous duplex studies were negative  Patient has remained afebrile with CBC negative for leukocytosis  Redness is receding after administration of IV cefazolin  Blood cultures negative  - Continue treating as cellulitis with IV cefazolin 2000mg Q8H   - Monitor the margins of redness daily   - Topical antihistamine prn for itching   - Continue to moisturize skin    - Keep wounds clean and dry with daily dressing changes  - Anticipate discharge tomorrow with transition to PO cephalexin     2) Type 2 diabetes  Well controlled on current medications  Last A1c was 5 5% in August 2018  - Hold home metformin   - SSI while in hospital     3) Hypertension  Well controlled on current medications with systolic BP in 265-512J and diastolic in 95'T  - Continue home Prinzide 20/12 5 PO daily      4) Hyperlipidemia  Well controlled on current medications  Last lipid panel showed LDL 87 and total cholesterol 169    - Continue home lovastatin 10mg PO daily     5) Restless leg syndrome    - Continue home pramipexole 0 125mg PO nightly     6) IGNACIA in setting of obesity    - Lifestyle and diet modifications for weight loss    Subjective:   Patient states that the redness has somewhat gone down since coming into the hospital  Still denies pain or paresthesias  He uses moisturizer on the legs which helps with the itching  Patient complains of thirst from fluid restriction and states that he drank over a cup of water very quickly in the morning  Otherwise no acute events overnight  Objective:     Vitals: Blood pressure 123/68, pulse 79, temperature 98 8 °F (37 1 °C), temperature source Oral, resp   rate 20, height 5' 5" (1 651 m), weight 118 kg (259 lb 12 8 oz), SpO2 95 %  ,Body mass index is 43 23 kg/m²  Intake/Output Summary (Last 24 hours) at 12/05/18 1619  Last data filed at 12/05/18 1444   Gross per 24 hour   Intake              840 ml   Output                0 ml   Net              840 ml       Physical Exam:   General: Patient sitting comfortably in chair  AOx3   Extremities: Circumferential swelling and erythema of the left lower extremity  Scattered dried and healing ulcers  No purulence or drainage  Sensations intact bilaterally  Dorsalis pedis pulses 1+ in LLE and 2+ in RLE       Invasive Devices     Peripheral Intravenous Line            Peripheral IV 12/03/18 Right Antecubital 2 days                Lab, Imaging and other studies:   Lab Results   Component Value Date    WBC 5 95 12/05/2018    HGB 12 8 12/05/2018    HCT 40 3 12/05/2018    MCV 88 12/05/2018     12/05/2018     Lab Results   Component Value Date     12/05/2013    K 3 8 12/05/2018     12/05/2018    CO2 29 12/05/2018    ANIONGAP 9 12/05/2013    BUN 12 12/05/2018    CREATININE 0 77 12/05/2018    GLUCOSE 99 12/05/2013    CALCIUM 9 3 12/05/2018    AST 18 03/08/2017    ALT 27 03/08/2017    ALKPHOS 69 03/08/2017    PROT 7 3 12/05/2013    BILITOT 0 36 12/05/2013    EGFR 101 12/05/2018

## 2018-12-05 NOTE — PROGRESS NOTES
IM Residency Progress Note   Unit/Bed#: [facility] 811-01 Encounter: [id number]  SOD Team A      Pat Gan  63 y o  male [id number]    Hospital Stay Days: 0      Assessment/Plan:    Principal Problem:    Cellulitis  Active Problems:    Benign essential hypertension    Diabetes mellitus, type 2 (HCC)    Hypercholesterolemia    1  Cellulitis  -hemodynamically stable, afebrile  -clinically improving, decrease swelling and erhythema  -continue Ancef IV today, will change to oral antibiotic  -discussed the importance of low salt diet, elevation of lower extremities while sitting or supine position  -anticipate discharge home tomorrow if continues to improve    2  Benign essential hypertension  -continue lisinopril/HCTZ    3  Diabetes mellitus, type 2  -continue diabetic diet    4  Hypercholesterolemia  -continue pravastatin    Disposition: continue inpatient care for IV antibiotics      Subjective:   Patient seen and examined at bedside  He still complains of swelling in and redness in the left lower extremity, but reports mild improvement  Patient denies any pain, fever, chills, headache, nausea, diarrhea or other complains; the rest of 12 points systems are negative  Vitals: Temp (24hrs), Av 8 °F (36 6 °C), Min:97 7 °F (36 5 °C), Max:97 9 °F (36 6 °C)  Current: Temperature: 97 7 °F (36 5 °C)  Vitals:    18 1311 18 1504 18 2300 18 0708   BP:  161/74 146/78 136/65   BP Location:  Left arm Left arm Left arm   Pulse:  90 92 80   Resp:  18 (!) 24 20   Temp:  97 8 °F (36 6 °C) 97 9 °F (36 6 °C) 97 7 °F (36 5 °C)   TempSrc:  Oral Oral Oral   SpO2:  96% 97% 97%   Weight: 118 kg (259 lb 12 8 oz)      Height: 5' 5" (1 651 m)       Body mass index is 43 23 kg/m²  I/O last 24 hours: In: 2476 3 [P O :1080; I V :1346 3; IV Piggyback:50]  Out: 200 [Urine:200]      Physical Exam   Constitutional: He is oriented to person, place, and time  He appears well-developed and well-nourished   No distress  HENT:   Head: Normocephalic and atraumatic  Eyes: Pupils are equal, round, and reactive to light  EOM are normal  No scleral icterus  Neck: Normal range of motion  Neck supple  Cardiovascular: Normal rate, regular rhythm and normal heart sounds  Exam reveals no gallop and no friction rub  No murmur heard  Pulmonary/Chest: Effort normal and breath sounds normal  No respiratory distress  He has no wheezes  He has no rales  He exhibits no tenderness  Abdominal: Soft  Bowel sounds are normal  He exhibits no distension  There is no tenderness  There is no rebound  Musculoskeletal: Normal range of motion  He exhibits no edema, tenderness or deformity  Lymphadenopathy:     He has no cervical adenopathy  Neurological: He is alert and oriented to person, place, and time  Skin: Skin is warm and dry  No rash noted  There is erythema  No pallor  Bilateral lower extremities swelling ant erythema , worse on the left, some healing excoriations on the left lower extremity  No active drainage noted  Psychiatric: He has a normal mood and affect           Invasive Devices     Peripheral Intravenous Line            Peripheral IV 12/03/18 Right Antecubital 2 days                          Labs:   Recent Results (from the past 24 hour(s))   Fingerstick Glucose (POCT)    Collection Time: 12/04/18  3:53 PM   Result Value Ref Range    POC Glucose 125 65 - 140 mg/dl   CBC    Collection Time: 12/05/18  6:16 AM   Result Value Ref Range    WBC 5 95 4 31 - 10 16 Thousand/uL    RBC 4 57 3 88 - 5 62 Million/uL    Hemoglobin 12 8 12 0 - 17 0 g/dL    Hematocrit 40 3 36 5 - 49 3 %    MCV 88 82 - 98 fL    MCH 28 0 26 8 - 34 3 pg    MCHC 31 8 31 4 - 37 4 g/dL    RDW 13 5 11 6 - 15 1 %    Platelets 607 899 - 856 Thousands/uL    MPV 8 4 (L) 8 9 - 12 7 fL   Basic metabolic panel    Collection Time: 12/05/18  6:16 AM   Result Value Ref Range    Sodium 138 136 - 145 mmol/L    Potassium 3 8 3 5 - 5 3 mmol/L    Chloride 103 100 - 108 mmol/L    CO2 29 21 - 32 mmol/L    ANION GAP 6 4 - 13 mmol/L    BUN 12 5 - 25 mg/dL    Creatinine 0 77 0 60 - 1 30 mg/dL    Glucose 104 65 - 140 mg/dL    Calcium 9 3 8 3 - 10 1 mg/dL    eGFR 101 ml/min/1 73sq m       Radiology Results: I have personally reviewed pertinent reports  Other Diagnostic Testing:   I have personally reviewed pertinent reports  Active Meds:   Current Facility-Administered Medications   Medication Dose Route Frequency    ceFAZolin (ANCEF) IVPB (premix) 2,000 mg  2,000 mg Intravenous Q8H    cholecalciferol (VITAMIN D3) tablet 2,000 Units  2,000 Units Oral Daily    diphenhydrAMINE (BENADRYL) injection 25 mg  25 mg Intravenous Once    enoxaparin (LOVENOX) subcutaneous injection 40 mg  40 mg Subcutaneous BID    ferrous sulfate tablet 325 mg  325 mg Oral Every Other Day    lisinopril-hydrochlorothiazide (PRINZIDE 20/12  5) combo dose   Oral Daily    loratadine (CLARITIN) tablet 10 mg  10 mg Oral Daily    pramipexole (MIRAPEX) tablet 0 125 mg  0 125 mg Oral Daily    pravastatin (PRAVACHOL) tablet 10 mg  10 mg Oral Daily With Dinner         VTE Pharmacologic Prophylaxis: Enoxaparin (Lovenox)  VTE Mechanical Prophylaxis: sequential compression device    Lieutenant Betty MD

## 2018-12-06 VITALS
HEIGHT: 65 IN | DIASTOLIC BLOOD PRESSURE: 83 MMHG | WEIGHT: 259.8 LBS | BODY MASS INDEX: 43.28 KG/M2 | HEART RATE: 85 BPM | SYSTOLIC BLOOD PRESSURE: 144 MMHG | TEMPERATURE: 98.1 F | OXYGEN SATURATION: 98 % | RESPIRATION RATE: 20 BRPM

## 2018-12-06 PROBLEM — L03.90 CELLULITIS: Status: RESOLVED | Noted: 2018-12-03 | Resolved: 2018-12-06

## 2018-12-06 PROCEDURE — 99217 PR OBSERVATION CARE DISCHARGE MANAGEMENT: CPT | Performed by: INTERNAL MEDICINE

## 2018-12-06 RX ORDER — CEPHALEXIN 500 MG/1
500 CAPSULE ORAL EVERY 6 HOURS SCHEDULED
Qty: 20 CAPSULE | Refills: 0 | Status: SHIPPED | OUTPATIENT
Start: 2018-12-06 | End: 2018-12-11

## 2018-12-06 RX ADMIN — VITAMIN D, TAB 1000IU (100/BT) 2000 UNITS: 25 TAB at 09:47

## 2018-12-06 RX ADMIN — FERROUS SULFATE TAB 325 MG (65 MG ELEMENTAL FE) 325 MG: 325 (65 FE) TAB at 09:48

## 2018-12-06 RX ADMIN — LISINOPRIL: 20 TABLET ORAL at 09:47

## 2018-12-06 RX ADMIN — LORATADINE 10 MG: 10 TABLET ORAL at 09:47

## 2018-12-06 RX ADMIN — ENOXAPARIN SODIUM 40 MG: 40 INJECTION SUBCUTANEOUS at 09:48

## 2018-12-06 RX ADMIN — CEFAZOLIN SODIUM 2000 MG: 2 SOLUTION INTRAVENOUS at 05:49

## 2018-12-06 NOTE — DISCHARGE INSTR - AVS FIRST PAGE
Take Keflex 500 mg four times daily for 5 days  Use new compression stockings everyday  Elevate feet frequently  Use moisturizing creams for legs and feet  Do not scratch legs

## 2018-12-06 NOTE — DISCHARGE SUMMARY
IMR Discharge Summary - Medical Levell Elvia  63 y o  male MRN: [id number]    [address] [facility]  Room / Bed: [facility] 811/[facility] 678-07 Encounter: [id number]    BRIEF OVERVIEW    Admitting Provider: Patricia Sears MD  Discharge Provider: No att  providers found  Primary Care Physician at Discharge:  Fely Campa    Discharge To: home      Admission Date: 12/3/2018     Discharge Date: 12/6/2018  2:08 PM    Primary Discharge Diagnosis  Principal Problem (Resolved):    Cellulitis  Active Problems:    Benign essential hypertension    Diabetes mellitus, type 2 ([facility] [address] )    Hypercholesterolemia      Other Problems Addressed:   DM, managed with diabetic diet while in the hospital, resume Metformin at home    Consulting Providers   none       Therapeutic Operative Procedures Performed  none      Discharge Disposition: Home/Self Care  Discharged With Lines: no    Test Results Pending at Discharge: none    Outpatient Follow-Up  With PCP, appt scheduled    Active Issues Requiring Follow-up   Continue to follow up on Diabetes    Code Status: Level 1 - Full Code     Medications     Your Medications   Your Medications    Morning Afternoon Evening Bedtime As Needed   ammonium lactate 12 % lotion   Commonly known as: AMLACTIN   Apply topically 2 (two) times a day   Refills: 5                   cephalexin 500 mg capsule   Commonly known as: KEFLEX   Take 1 capsule (500 mg total) by mouth every 6 (six) hours for 5 days   Refills: 0                   ferrous sulfate 325 (65 Fe) mg tablet   Take 325 mg by mouth   Refills: 0                   lisinopril-hydrochlorothiazide 20-12 5 MG per tablet   Commonly known as: PRINZIDE,ZESTORETIC   TAKE 1 TABLET BY MOUTH EVERY DAY   Refills: 0                   loratadine 10 mg tablet   Commonly known as: CLARITIN   For: Chronic Nonallergic Rhinitis   Take 10 mg by mouth daily   Refills: 0                   lovastatin 10 MG tablet   Commonly known as: MEVACOR   TAKE 1 TABLET (10 MG TOTAL) BY MOUTH DAILY AT BEDTIME   Refills: 0   What changed: when to take this                   metFORMIN 500 mg tablet   Commonly known as: GLUCOPHAGE   TAKE 1 TABLET BY MOUTH EVERY DAY   Refills: 1                   pramipexole 0 125 mg tablet   Commonly known as: MIRAPEX   Take 1 tablet (0 125 mg total) by mouth daily   Refills: 0                   Vitamin D3 2000 units capsule   Take 1 capsule by mouth daily   Refills: 0                         Allergies  No Known Allergies  Discharge Diet: diabetic diet  Activity restrictions: as tolerated    [facility]  This is a 28-year-old white with past medical history of diabetes mellitus, hypertension presents with complaints of swelling and redness in his left lower extremity for 2 days  He was diagnosed with cellulitis of lower extremities and was treated with IV Ancef  His condition was improving  He remained afebrile and hemodynamically stable throughout the stay without leucocytosis  Patient stated that he noticed decreased swelling and decreased redness in his left lower extremity and had no other complaints at the day of discharge  All 12 systems were reviewed and were negative  He was discharged home on Keflex 500 mg q 6 hours for 5 days  Written instructions given to the patient to call PCP if he develops fever or worsening of redness  Patient was also instructed to keep his extremities elevated and written prescription for compressive stockings given to the patient  [facility] with [facility]  Physical Exam   Constitutional: He is oriented to person, place, and time  He appears well-developed and well-nourished  No distress  HENT:   Head: Normocephalic and atraumatic  Neck: Normal range of motion  Cardiovascular: Normal rate, regular rhythm and normal heart sounds  Exam reveals no gallop and no friction rub  No murmur heard    Pulmonary/Chest: Effort normal and breath sounds normal  No respiratory distress  He has no wheezes  He has no rales  He exhibits no tenderness  Abdominal: Soft  Bowel sounds are normal  He exhibits no distension  There is no tenderness  There is no rebound  Musculoskeletal: Normal range of motion  He exhibits no edema, tenderness or deformity  Neurological: He is alert and oriented to person, place, and time  Skin: Skin is warm and dry  No rash noted  There is erythema  No pallor  Mild swelling and redness on the left lower extremity, improved   Psychiatric: He has a normal mood and affect  Presenting Problem/History of Present Illness  Principal Problem (Resolved):    Cellulitis  Active Problems:    Benign essential hypertension    Diabetes mellitus, type 2 (HCC)    Hypercholesterolemia        Other Pertinent Test Results  Blood cultures -no growth at 48 hrs    Discharge Condition: stable      Discharge  Statement   I spent 30 minutes minutes discharging the patient  This time was spent on the day of discharge  I had direct contact with the patient on the day of discharge  Additional documentation is required if more than 30 minutes were spent on discharge

## 2018-12-06 NOTE — SOCIAL WORK
Cm reviewed patient during care coordination rounds  Patient stable for discharge home today  Patient to discharge home once stable  Patient to call his brother to transport him home  If unable to transport, patient informed he will likely take the bus home  Cm following and RN Shayy Shoemaker aware

## 2018-12-06 NOTE — DISCHARGE INSTRUCTIONS
Cellulitis, Ambulatory Care   GENERAL INFORMATION:   Cellulitis  is a skin infection caused by bacteria  Common symptoms include the following:   · Fever    · A red, warm, swollen area on your skin    · Pain when the area is touched    · Bumps or blisters (abscess) that may drain pus    · Bumpy, raised skin that feels like an orange peel  Seek immediate care for the following symptoms:   · An increase in pain, redness, warmth, and size    · Red streaks coming from the infected area    · A thin, gray-brown discharge coming from your infected skin area    · A crackling under your skin when you touch it    · Purple dots or bumps on your skin, or bleeding under your skin    · New swelling and pain in your legs    · Sudden trouble breathing or chest pain  Treatment for cellulitis  may include medicines to treat the bacterial infection or decrease pain  The infection may need to be cleaned out  Damaged, dead, or infected tissue may need to be cut away to help your wound heal   Manage your symptoms:   · Elevate your wound above the level of your heart  as often as you can  This will help decrease swelling and pain  Prop your wound on pillows or blankets to keep it elevated comfortably  · Clean your wound as directed  You may need to wash the wound with soap and water  Look for signs of infection  · Wear pressure stockings as directed  The stockings are tight and put pressure on your legs  This improves blood flow and decreases swelling  Prevent cellulitis:   · Wash your hands often  Use soap and water  Wash your hands after you use the bathroom, change a child's diapers, or sneeze  Wash your hands before you prepare or eat food  Use lotion to prevent dry, cracked skin  · Do not share personal items, such as towels, clothing, and razors  · Clean exercise equipment  with germ-killing  before and after you use it    Follow up with your healthcare provider as directed:  Write down your questions so you remember to ask them during your visits  CARE AGREEMENT:   You have the right to help plan your care  Learn about your health condition and how it may be treated  Discuss treatment options with your caregivers to decide what care you want to receive  You always have the right to refuse treatment  The above information is an  only  It is not intended as medical advice for individual conditions or treatments  Talk to your doctor, nurse or pharmacist before following any medical regimen to see if it is safe and effective for you  © 2014 [address] is for End User's use only and may not be sold, redistributed or otherwise used for commercial purposes  All illustrations and images included in CareNotes® are the copyrighted property of [employer]  or Elver Rogers

## 2018-12-07 ENCOUNTER — TRANSITIONAL CARE MANAGEMENT (OUTPATIENT)
Dept: INTERNAL MEDICINE CLINIC | Facility: CLINIC | Age: 58
End: 2018-12-07

## 2018-12-09 LAB
BACTERIA BLD CULT: NORMAL
BACTERIA BLD CULT: NORMAL

## 2018-12-18 ENCOUNTER — OFFICE VISIT (OUTPATIENT)
Dept: INTERNAL MEDICINE CLINIC | Facility: CLINIC | Age: 58
End: 2018-12-18
Payer: COMMERCIAL

## 2018-12-18 VITALS
HEIGHT: 65 IN | DIASTOLIC BLOOD PRESSURE: 72 MMHG | TEMPERATURE: 97.7 F | WEIGHT: 267.2 LBS | SYSTOLIC BLOOD PRESSURE: 144 MMHG | BODY MASS INDEX: 44.52 KG/M2 | HEART RATE: 84 BPM

## 2018-12-18 DIAGNOSIS — L03.116 CELLULITIS OF LEFT LOWER EXTREMITY: Primary | ICD-10-CM

## 2018-12-18 DIAGNOSIS — E78.5 HYPERLIPIDEMIA, UNSPECIFIED HYPERLIPIDEMIA TYPE: ICD-10-CM

## 2018-12-18 DIAGNOSIS — I10 ESSENTIAL HYPERTENSION: ICD-10-CM

## 2018-12-18 DIAGNOSIS — E11.9 TYPE 2 DIABETES MELLITUS WITHOUT COMPLICATION, WITHOUT LONG-TERM CURRENT USE OF INSULIN (HCC): ICD-10-CM

## 2018-12-18 DIAGNOSIS — I10 BENIGN ESSENTIAL HYPERTENSION: ICD-10-CM

## 2018-12-18 DIAGNOSIS — E78.00 HYPERCHOLESTEROLEMIA: ICD-10-CM

## 2018-12-18 PROBLEM — M79.604 DIFFUSE PAIN IN RIGHT LOWER EXTREMITY: Status: RESOLVED | Noted: 2018-01-05 | Resolved: 2018-12-18

## 2018-12-18 PROCEDURE — 99495 TRANSJ CARE MGMT MOD F2F 14D: CPT | Performed by: PHYSICIAN ASSISTANT

## 2018-12-18 RX ORDER — LISINOPRIL AND HYDROCHLOROTHIAZIDE 20; 12.5 MG/1; MG/1
TABLET ORAL
Qty: 90 TABLET | Refills: 0 | Status: CANCELLED | OUTPATIENT
Start: 2018-12-18

## 2018-12-18 RX ORDER — LOVASTATIN 10 MG/1
10 TABLET ORAL
Qty: 90 TABLET | Refills: 1 | Status: SHIPPED | OUTPATIENT
Start: 2018-12-18 | End: 2019-02-27 | Stop reason: ALTCHOICE

## 2018-12-18 RX ORDER — LISINOPRIL AND HYDROCHLOROTHIAZIDE 20; 12.5 MG/1; MG/1
1 TABLET ORAL DAILY
Qty: 90 TABLET | Refills: 1 | Status: SHIPPED | OUTPATIENT
Start: 2018-12-18 | End: 2019-02-27 | Stop reason: ALTCHOICE

## 2018-12-18 NOTE — ASSESSMENT & PLAN NOTE
Significant improvement after hospitalization and antibiotics  You report you are aware that were advised it would take a number of weeks for the swelling to completely go down  You report you will get the follow-up CBC today  Also as we discussed today if you find that the swelling or redness to your left leg or either leg is returning or getting worse please call here or return to the emergency room

## 2018-12-18 NOTE — ASSESSMENT & PLAN NOTE
Review blood pressure is slightly elevated today  It has been at goal in the past   No adjustments to medication today will re-evaluate at follow-up

## 2018-12-18 NOTE — PROGRESS NOTES
Assessment/Plan:    Cellulitis of left lower extremity  Significant improvement after hospitalization and antibiotics  You report you are aware that were advised it would take a number of weeks for the swelling to completely go down  You report you will get the follow-up CBC today  Also as we discussed today if you find that the swelling or redness to your left leg or either leg is returning or getting worse please call here or return to the emergency room  Type 2 diabetes mellitus without complication, without long-term current use of insulin [facility]  Lab Results   Component Value Date    HGBA1C 5 5 08/02/2018     As we discussed today I will be giving you a script to get your labs completed  No changes to medications at this time      Hypercholesterolemia  Please continue your lovastatin 10 mg daily and as discussed when we get your lab results we will discuss any changes to medications if needed  Benign essential hypertension  Review blood pressure is slightly elevated today  It has been at goal in the past   No adjustments to medication today will re-evaluate at follow-up  Diagnoses and all orders for this visit:    Cellulitis of left lower extremity    Benign essential hypertension    Type 2 diabetes mellitus without complication, without long-term current use of insulin ([facility])  -     Comprehensive metabolic panel  -     Hemoglobin A1C  -     Microalbumin / creatinine urine ratio    Hypercholesterolemia  -     Lipid Panel with Direct LDL reflex          Subjective:      Patient ID: Karla New  is a 63 y o  male  Pt here for hospital follow up  Was IP for cellulitis from 12/3 to 12/6/18  Patient had presented to the emergency room complaining of pain, redness and swelling of his left lower extremity for 2 days prior to presentation  Patient was evaluated by Infectious Disease and treated with IV Ancef    After appropriate course of treatment patient was significantly improving and deemed stable for discharge  Patient was discharged on Keflex 500 mg 4 times daily to complete 5 day course outpatient  Patient was also advised to keep lower extremities elevated when not walking  He was also given a prescription for compressive socks however he has not purchase these yet  States completed antibiotics and was told swelling would take weeks to resolve  Does have slight discomfort back of L leg but has been keeping it elevated without support  Patient does confirm that the redness and warmth to his left leg has significantly improved  Patient no longer having pain throughout his entire leg mostly behind knee to just superior of calf  Patient reports he had similar pain prior to admission  It is noted patient did have vascular studies while inpatient with no DVTs noted  Will be getting his follow up CBC today    Discussed patient is also due for his labs  Last A1c in August was 5 5% patient continued to take metformin once daily  Patient continues to take his cholesterol medicine however has not had that evaluated in 1 year  Patient is aware that no changes to his medications will be made today until he completes new labs for full evaluation  Patient reports he is very vigilant about taking care of his feet and diabetes as his father had diabetes and ended up with multiple amputations  Patient agreeable to transfer his PCP care to myself as previous provider no longer in the office  Patient declines flu vaccine  Patient reports that he receive this vaccine once a few years ago and he had a severe reaction where he felt so sick that he thought he was going to have to admit himself  Patient states it was not just a 1 or 2 days that he felt very unwell and weak  Patient however is agreeable to a pneumonia vaccine however he would like to wait until after [place] when he returns for lab review to receive this      Also discussed with patient that referral to GI was placed at previous visit and still needs to call to schedule appointment for his screening colonoscopy  TCM Call (since 11/17/2018)     Hospital care reviewed  Records reviewed    Patient was hospitialized at  [facility]    Date of Admission  12/03/18    Date of discharge  12/06/18    Diagnosis  CELLULITIS - L03 90    Disposition  Home      TCM Call (since 11/17/2018)     Should patient be enrolled in anticoag monitoring? No    Scheduled for follow up? Yes (12/18/18 WITH SANDRA GLASS)    I have advised the patient to call PCP with any new or worsening symptoms    BRYAN IZAGUIRRE LPN    Comments  TCM CALL NOT COMPLETE, PT  DID NOT RETURN CALLS              The following portions of the patient's history were reviewed and updated as appropriate: allergies, current medications, past family history, past medical history, past social history, past surgical history and problem list     Review of Systems   Constitutional: Negative  Negative for chills and fever  HENT: Negative  Respiratory: Negative  Negative for cough, chest tightness and shortness of breath  Cardiovascular: Positive for leg swelling  Negative for chest pain and palpitations  Gastrointestinal: Negative  Negative for abdominal pain, constipation and diarrhea  Endocrine: Negative  Negative for polydipsia, polyphagia and polyuria  Genitourinary: Negative  Musculoskeletal: Positive for myalgias  As noted in HPI patient has been reporting some discomfort behind his left knee  Patient has been keeping his leg elevated however leg has not had any support behind the knee  Patient advised to place question underneath for better support   Skin: Positive for color change  As noted in HPI patient reports the redness to left lower extremity is significantly improving  Neurological: Negative  Negative for syncope, light-headedness and headaches  Psychiatric/Behavioral: Negative            Objective:      /72 (BP Location: Left arm, Patient Position: Sitting, Cuff Size: Adult)   Pulse 84   Temp 97 7 °F (36 5 °C) (Oral)   Ht 5' 5" (1 651 m)   Wt 121 kg (267 lb 3 2 oz)   BMI 44 46 kg/m²          Physical Exam   Constitutional: He is oriented to person, place, and time  He appears well-developed and well-nourished  HENT:   Head: Normocephalic  Neck: No JVD present  Cardiovascular: Normal rate, regular rhythm and normal heart sounds  No murmur heard  Patient does have some mild 1+ pitting edema bilaterally  Patient does have some hyperpigmentation bilaterally from venous stasis mottling  Pulmonary/Chest: Effort normal and breath sounds normal  No respiratory distress  He has no wheezes  He has no rales  Abdominal: Soft  Bowel sounds are normal  There is no tenderness  Musculoskeletal: He exhibits edema  Neurological: He is alert and oriented to person, place, and time  Skin: There is erythema  Patient's left lower extremity as compared to right is slightly larger but patient confirms it has significantly improved  There still remains some hyper pigmentation slightly more uniform then on the right  Once again patient reports this is a significant improvement and continues to improve as outpatient  There are no open lesions bilateral lower extremities  No open wounds  No increased temperature to touch  Psychiatric: He has a normal mood and affect   His behavior is normal

## 2018-12-18 NOTE — PATIENT INSTRUCTIONS
As discussed you will get the follow-up CBC blood test completed today  I have provided you with a script to get all of your other labs completed  You are aware that should you develop any new swelling redness or pain to either lower extremity you will call here or return to the emergency room  Schedule an appointment with me after labs completed for review  Referral was provided to you for colonoscopy screening in August please call and schedule this appointment

## 2018-12-18 NOTE — ASSESSMENT & PLAN NOTE
Please continue your lovastatin 10 mg daily and as discussed when we get your lab results we will discuss any changes to medications if needed

## 2018-12-18 NOTE — ASSESSMENT & PLAN NOTE
Lab Results   Component Value Date    HGBA1C 5 5 08/02/2018     As we discussed today I will be giving you a script to get your labs completed    No changes to medications at this time

## 2018-12-24 NOTE — PROGRESS NOTES
CALLED PATIENT TO MAKE HIM AWARE THAT THERE'S BLOOD WORK TO BE COMPLETED (LIPID PANEL WITH DIRECT LDL REFLEX, COMPREHENSIVE METABOLIC PANEL, HEMOGLOBIN A1C, MICROALBUMIN/CREATININE URINE RATIO ) AND DIDN'T ANSWER  VM LEFT WITH THE REMINDER AND TO CALL US BACK WITH ANY QUESTION

## 2019-01-15 ENCOUNTER — LAB (OUTPATIENT)
Dept: LAB | Facility: CLINIC | Age: 59
End: 2019-01-15
Payer: COMMERCIAL

## 2019-01-15 ENCOUNTER — TELEPHONE (OUTPATIENT)
Dept: INTERNAL MEDICINE CLINIC | Facility: CLINIC | Age: 59
End: 2019-01-15

## 2019-01-15 DIAGNOSIS — Z11.4 ENCOUNTER FOR SCREENING FOR HIV: ICD-10-CM

## 2019-01-15 LAB
ALBUMIN SERPL BCP-MCNC: 4 G/DL (ref 3.5–5)
ALP SERPL-CCNC: 73 U/L (ref 46–116)
ALT SERPL W P-5'-P-CCNC: 58 U/L (ref 12–78)
ANION GAP SERPL CALCULATED.3IONS-SCNC: 7 MMOL/L (ref 4–13)
AST SERPL W P-5'-P-CCNC: 33 U/L (ref 5–45)
BILIRUB SERPL-MCNC: 0.3 MG/DL (ref 0.2–1)
BUN SERPL-MCNC: 12 MG/DL (ref 5–25)
CALCIUM SERPL-MCNC: 8.9 MG/DL (ref 8.3–10.1)
CHLORIDE SERPL-SCNC: 102 MMOL/L (ref 100–108)
CHOLEST SERPL-MCNC: 155 MG/DL (ref 50–200)
CO2 SERPL-SCNC: 30 MMOL/L (ref 21–32)
CREAT SERPL-MCNC: 0.69 MG/DL (ref 0.6–1.3)
CREAT UR-MCNC: 65.1 MG/DL
EST. AVERAGE GLUCOSE BLD GHB EST-MCNC: 151 MG/DL
GFR SERPL CREATININE-BSD FRML MDRD: 105 ML/MIN/1.73SQ M
GLUCOSE P FAST SERPL-MCNC: 104 MG/DL (ref 65–99)
HBA1C MFR BLD: 6.9 % (ref 4.2–6.3)
HDLC SERPL-MCNC: 46 MG/DL (ref 40–60)
LDLC SERPL CALC-MCNC: 79 MG/DL (ref 0–100)
MICROALBUMIN UR-MCNC: 6.4 MG/L (ref 0–20)
MICROALBUMIN/CREAT 24H UR: 10 MG/G CREATININE (ref 0–30)
POTASSIUM SERPL-SCNC: 3.7 MMOL/L (ref 3.5–5.3)
PROT SERPL-MCNC: 7.6 G/DL (ref 6.4–8.2)
SODIUM SERPL-SCNC: 139 MMOL/L (ref 136–145)
TRIGL SERPL-MCNC: 150 MG/DL

## 2019-01-15 PROCEDURE — 83036 HEMOGLOBIN GLYCOSYLATED A1C: CPT | Performed by: PHYSICIAN ASSISTANT

## 2019-01-15 PROCEDURE — 3061F NEG MICROALBUMINURIA REV: CPT | Performed by: PHYSICIAN ASSISTANT

## 2019-01-15 PROCEDURE — 82570 ASSAY OF URINE CREATININE: CPT | Performed by: PHYSICIAN ASSISTANT

## 2019-01-15 PROCEDURE — 82043 UR ALBUMIN QUANTITATIVE: CPT | Performed by: PHYSICIAN ASSISTANT

## 2019-01-15 PROCEDURE — 36415 COLL VENOUS BLD VENIPUNCTURE: CPT | Performed by: PHYSICIAN ASSISTANT

## 2019-01-15 PROCEDURE — 80053 COMPREHEN METABOLIC PANEL: CPT | Performed by: PHYSICIAN ASSISTANT

## 2019-01-15 PROCEDURE — 87389 HIV-1 AG W/HIV-1&-2 AB AG IA: CPT

## 2019-01-15 PROCEDURE — 80061 LIPID PANEL: CPT | Performed by: PHYSICIAN ASSISTANT

## 2019-01-15 NOTE — TELEPHONE ENCOUNTER
LUIS FERNANDO REDDY'S PT- HAS YET TO BE TRANSFERRED TO NEW PCP    PT LOST HIS GLUCOMETER, CAN YOU PLEASE SEND A SCRIPT FOR A NEW ONE TO HIS PHARMACY ALONG WITH TEST STRIPS AND LANCETS?     PT USES [facility] ON [address] IN [facility]

## 2019-01-15 NOTE — TELEPHONE ENCOUNTER
Patient called back and was made aware  He was upset that he has been checking his sugars and didn't really need to be  Also, advised patient to complete labs that where ordered and he did today

## 2019-01-16 LAB — HIV 1+2 AB+HIV1 P24 AG SERPL QL IA: NORMAL

## 2019-01-21 NOTE — TELEPHONE ENCOUNTER
Patient completed his portion of form  Form was faxed to [facility]  Form scanned into chart

## 2019-01-31 RX ORDER — BLOOD-GLUCOSE METER
KIT MISCELLANEOUS DAILY
Refills: 3 | COMMUNITY
Start: 2019-01-23 | End: 2019-07-19 | Stop reason: SDUPTHER

## 2019-02-07 ENCOUNTER — OFFICE VISIT (OUTPATIENT)
Dept: INTERNAL MEDICINE CLINIC | Facility: CLINIC | Age: 59
End: 2019-02-07

## 2019-02-07 VITALS
HEIGHT: 65 IN | DIASTOLIC BLOOD PRESSURE: 74 MMHG | TEMPERATURE: 98.2 F | HEART RATE: 80 BPM | BODY MASS INDEX: 46.57 KG/M2 | SYSTOLIC BLOOD PRESSURE: 120 MMHG | WEIGHT: 279.54 LBS

## 2019-02-07 DIAGNOSIS — E11.9 TYPE 2 DIABETES MELLITUS WITHOUT COMPLICATION, WITHOUT LONG-TERM CURRENT USE OF INSULIN (HCC): ICD-10-CM

## 2019-02-07 DIAGNOSIS — R60.0 BILATERAL LOWER EXTREMITY EDEMA: Primary | ICD-10-CM

## 2019-02-07 DIAGNOSIS — Z23 NEED FOR PNEUMOCOCCAL VACCINATION: ICD-10-CM

## 2019-02-07 DIAGNOSIS — G47.33 OBSTRUCTIVE SLEEP APNEA: ICD-10-CM

## 2019-02-07 PROBLEM — L03.116 CELLULITIS OF LEFT LOWER EXTREMITY: Status: RESOLVED | Noted: 2018-12-18 | Resolved: 2019-02-07

## 2019-02-07 PROCEDURE — 90732 PPSV23 VACC 2 YRS+ SUBQ/IM: CPT

## 2019-02-07 PROCEDURE — 99213 OFFICE O/P EST LOW 20 MIN: CPT | Performed by: INTERNAL MEDICINE

## 2019-02-07 PROCEDURE — 90471 IMMUNIZATION ADMIN: CPT

## 2019-02-07 NOTE — PATIENT INSTRUCTIONS
This is not infection  You are retaining excessive fluid and this is concerning for heart  You would like to go to ER as getting worse and now associated with shortness of breath and difficulty walking

## 2019-02-07 NOTE — PROGRESS NOTES
Assessment/Plan:    No problem-specific Assessment & Plan notes found for this encounter  Diagnoses and all orders for this visit:    Bilateral lower extremity edema    Type 2 diabetes mellitus without complication, without long-term current use of insulin (HCC)    Obstructive sleep apnea    Need for pneumococcal vaccination  -     PNEUMOCOCCAL POLYSACCHARIDE VACCINE 23-VALENT =>3YO SQ IM          Subjective:      Patient ID: Jevon Roldan  is a 63 y o  male  Patients c/o bilateral lower leg swelling and redness  Hospitalization in 12/18 left leg was swollen, right leg not as bad  Had cellulitis in the left leg  At discharge, cellulitis had resolved  Now right is as bad as left with the swelling  Three weeks ago the right leg started to have increased swelling  Both legs are now more red in color and continue to progress   He denies fever, chills, or red streaks  He has no chest pain, nausea, vomiting, or lightheadedness  He notes since the increase in swelling of his legs he is short of breath after just walking one block  His legs are not painful  But feel like on fire      Notes when sitting for a long period of time both legs will hurt and the pain is relieved with walking  This is in hamstrings not calves    Reviewed labs  Admits was off his DM diet and gained weight over past year  A1C was 5 5% now 6 9%    Continues to use his c-pap every night    Patient is aware that he could have labs, heart testing and medications to help decrease this fluid out patient however this would take a number of days for him to notice improvement  Patient reports he would prefer to go to the hospital where he can get evaluations and assistance with getting rid of this fluid right away  Patient reports over the past 3 weeks the discomfort, burning sensation in his legs and shortness of breath has been significantly progressive    Patient denies any chest pain now and does not report any history of chest pain, left arm or jaw pain or diaphoretic events over the past 3 weeks  The following portions of the patient's history were reviewed and updated as appropriate: allergies, current medications, past family history, past medical history, past social history, past surgical history and problem list     Review of Systems   Constitutional: Negative for chills, diaphoresis and fever  Eyes: Negative  Respiratory: Positive for shortness of breath  Negative for cough, chest tightness and wheezing  Cardiovascular: Positive for leg swelling  Negative for chest pain and palpitations  Gastrointestinal: Negative for abdominal pain, constipation and diarrhea  Endocrine: Positive for polydipsia  Negative for polyphagia and polyuria  Genitourinary: Negative for difficulty urinating and frequency  Neurological: Negative  Negative for numbness  Objective:      /74 (BP Location: Right arm, Patient Position: Sitting, Cuff Size: Large)   Pulse 80   Temp 98 2 °F (36 8 °C) (Oral)   Ht 5' 5" (1 651 m)   Wt 127 kg (279 lb 8 7 oz)   BMI 46 52 kg/m²          Physical Exam   Constitutional: He is oriented to person, place, and time  HENT:   Head: Normocephalic and atraumatic  Cardiovascular: Normal rate, regular rhythm and normal heart sounds  Exam reveals no S3  No murmur heard  Abdominal: Soft  Bowel sounds are normal  He exhibits no fluid wave  There is no tenderness  Musculoskeletal: He exhibits edema  Bilateral lower extremities with significant pitting edema  Bilateral lower extremities are tense, erythematous increased warmth to touch  Patient confirm sensation intact to light touch  Neurological: He is alert and oriented to person, place, and time

## 2019-02-19 ENCOUNTER — TELEPHONE (OUTPATIENT)
Dept: INTERNAL MEDICINE CLINIC | Facility: CLINIC | Age: 59
End: 2019-02-19

## 2019-02-19 NOTE — TELEPHONE ENCOUNTER
SANDRA ASKED THAT I REACH OUT TO PT  TO F/U ON HIS OFFICE VISIT FROM 2/7/19 AS SHE SENT HIM TO THE ER FOR EVALUATION OF BILATERAL LOWER EXTREMITY EDEMA  THERE IS NO RECORD OF PT  GOING TO THE ER AS HE SAID HE WAS GOING TO [facility]   I CALLED AND LMOM FOR HIM TO CALL BACK TO DISCUSS

## 2019-02-20 ENCOUNTER — TELEPHONE (OUTPATIENT)
Dept: INTERNAL MEDICINE CLINIC | Facility: CLINIC | Age: 59
End: 2019-02-20

## 2019-02-21 NOTE — TELEPHONE ENCOUNTER
SPOKE TO PT  HE SAID HE DID GO TO [facility] AFTER HE LEFT THE OFFICE  THEY DISCONTINUED HIS LISINOPRIL AND DISCHARGED HIM WITH 5 OTHER MEDS  HE SAID HIS EDEMA HAS GONE DOWN AND HE LOST 23 POUNDS IN 8 DAYS  HE IS SCHEDULED TO SEE [address], CARDIOLOGIST FROM [facility] ON FRI 2/22/19   I ALSO SCHEDULED HIM WITH YOU NEXT WEEK ON 2/27/19 FOR A TCM AND ADVISED HIM  [facility] PAPERWORK AND HIS MEDICATION BOTTLES

## 2019-02-22 ENCOUNTER — TRANSCRIBE ORDERS (OUTPATIENT)
Dept: LAB | Facility: HOSPITAL | Age: 59
End: 2019-02-22

## 2019-02-22 ENCOUNTER — APPOINTMENT (OUTPATIENT)
Dept: LAB | Facility: HOSPITAL | Age: 59
End: 2019-02-22
Payer: COMMERCIAL

## 2019-02-22 DIAGNOSIS — M79.89 SWELLING OF LIMB: Primary | ICD-10-CM

## 2019-02-22 DIAGNOSIS — M79.89 SWELLING OF LIMB: ICD-10-CM

## 2019-02-22 LAB
ANION GAP SERPL CALCULATED.3IONS-SCNC: 11 MMOL/L (ref 4–13)
BUN SERPL-MCNC: 18 MG/DL (ref 5–25)
CALCIUM SERPL-MCNC: 8.5 MG/DL (ref 8.3–10.1)
CHLORIDE SERPL-SCNC: 103 MMOL/L (ref 100–108)
CO2 SERPL-SCNC: 26 MMOL/L (ref 21–32)
CREAT SERPL-MCNC: 0.7 MG/DL (ref 0.6–1.3)
GFR SERPL CREATININE-BSD FRML MDRD: 105 ML/MIN/1.73SQ M
GLUCOSE SERPL-MCNC: 91 MG/DL (ref 65–140)
POTASSIUM SERPL-SCNC: 4.2 MMOL/L (ref 3.5–5.3)
SODIUM SERPL-SCNC: 140 MMOL/L (ref 136–145)

## 2019-02-22 PROCEDURE — 36415 COLL VENOUS BLD VENIPUNCTURE: CPT

## 2019-02-22 PROCEDURE — 80048 BASIC METABOLIC PNL TOTAL CA: CPT

## 2019-02-25 RX ORDER — FUROSEMIDE 40 MG/1
80 TABLET ORAL
COMMUNITY
Start: 2019-02-22 | End: 2019-03-22 | Stop reason: ALTCHOICE

## 2019-02-25 RX ORDER — POTASSIUM CHLORIDE 20 MEQ/1
40 TABLET, EXTENDED RELEASE ORAL
COMMUNITY
Start: 2019-02-22

## 2019-02-25 RX ORDER — CARVEDILOL 6.25 MG/1
6.25 TABLET ORAL
COMMUNITY
Start: 2019-02-15

## 2019-02-25 RX ORDER — AMMONIUM LACTATE 12 G/100G
CREAM TOPICAL
COMMUNITY
End: 2020-10-20 | Stop reason: ALTCHOICE

## 2019-02-25 RX ORDER — LISINOPRIL 20 MG/1
20 TABLET ORAL
COMMUNITY
Start: 2019-02-15 | End: 2019-10-11 | Stop reason: ALTCHOICE

## 2019-02-25 RX ORDER — LOVASTATIN 20 MG/1
10 TABLET ORAL
COMMUNITY
End: 2019-03-22 | Stop reason: ALTCHOICE

## 2019-02-25 RX ORDER — GABAPENTIN 300 MG/1
300 CAPSULE ORAL
COMMUNITY
Start: 2019-02-15 | End: 2019-06-19 | Stop reason: SDUPTHER

## 2019-02-26 ENCOUNTER — TRANSCRIBE ORDERS (OUTPATIENT)
Dept: LAB | Facility: HOSPITAL | Age: 59
End: 2019-02-26

## 2019-02-26 ENCOUNTER — APPOINTMENT (OUTPATIENT)
Dept: LAB | Facility: HOSPITAL | Age: 59
End: 2019-02-26
Payer: COMMERCIAL

## 2019-02-26 DIAGNOSIS — I50.31 ACUTE DIASTOLIC HEART FAILURE (HCC): Primary | ICD-10-CM

## 2019-02-26 DIAGNOSIS — I10 ESSENTIAL HYPERTENSION, MALIGNANT: ICD-10-CM

## 2019-02-26 DIAGNOSIS — E66.01 MORBID OBESITY (HCC): ICD-10-CM

## 2019-02-26 DIAGNOSIS — I50.31 ACUTE DIASTOLIC HEART FAILURE (HCC): ICD-10-CM

## 2019-02-26 LAB
ANION GAP SERPL CALCULATED.3IONS-SCNC: 8 MMOL/L (ref 4–13)
BUN SERPL-MCNC: 16 MG/DL (ref 5–25)
CALCIUM SERPL-MCNC: 8.6 MG/DL (ref 8.3–10.1)
CHLORIDE SERPL-SCNC: 101 MMOL/L (ref 100–108)
CO2 SERPL-SCNC: 29 MMOL/L (ref 21–32)
CREAT SERPL-MCNC: 0.72 MG/DL (ref 0.6–1.3)
GFR SERPL CREATININE-BSD FRML MDRD: 104 ML/MIN/1.73SQ M
GLUCOSE SERPL-MCNC: 91 MG/DL (ref 65–140)
POTASSIUM SERPL-SCNC: 3.8 MMOL/L (ref 3.5–5.3)
SODIUM SERPL-SCNC: 138 MMOL/L (ref 136–145)

## 2019-02-26 PROCEDURE — 80048 BASIC METABOLIC PNL TOTAL CA: CPT

## 2019-02-26 PROCEDURE — 36415 COLL VENOUS BLD VENIPUNCTURE: CPT

## 2019-02-27 ENCOUNTER — OFFICE VISIT (OUTPATIENT)
Dept: INTERNAL MEDICINE CLINIC | Facility: CLINIC | Age: 59
End: 2019-02-27

## 2019-02-27 ENCOUNTER — TRANSITIONAL CARE MANAGEMENT (OUTPATIENT)
Dept: INTERNAL MEDICINE CLINIC | Facility: CLINIC | Age: 59
End: 2019-02-27

## 2019-02-27 VITALS
DIASTOLIC BLOOD PRESSURE: 74 MMHG | HEART RATE: 80 BPM | HEIGHT: 65 IN | BODY MASS INDEX: 45.77 KG/M2 | WEIGHT: 274.69 LBS | TEMPERATURE: 97.6 F | SYSTOLIC BLOOD PRESSURE: 90 MMHG

## 2019-02-27 DIAGNOSIS — I10 BENIGN ESSENTIAL HYPERTENSION: ICD-10-CM

## 2019-02-27 DIAGNOSIS — E11.59 TYPE 2 DIABETES MELLITUS WITH OTHER CIRCULATORY COMPLICATION, WITHOUT LONG-TERM CURRENT USE OF INSULIN (HCC): ICD-10-CM

## 2019-02-27 DIAGNOSIS — M54.5 CHRONIC BILATERAL LOW BACK PAIN, WITH SCIATICA PRESENCE UNSPECIFIED: ICD-10-CM

## 2019-02-27 DIAGNOSIS — G89.29 CHRONIC BILATERAL LOW BACK PAIN, WITH SCIATICA PRESENCE UNSPECIFIED: ICD-10-CM

## 2019-02-27 DIAGNOSIS — I50.31 ACUTE DIASTOLIC HEART FAILURE (HCC): ICD-10-CM

## 2019-02-27 DIAGNOSIS — M54.40 ACUTE BILATERAL LOW BACK PAIN WITH SCIATICA, SCIATICA LATERALITY UNSPECIFIED: ICD-10-CM

## 2019-02-27 DIAGNOSIS — G47.33 OBSTRUCTIVE SLEEP APNEA: Primary | ICD-10-CM

## 2019-02-27 DIAGNOSIS — R06.02 SHORT OF BREATH ON EXERTION: ICD-10-CM

## 2019-02-27 DIAGNOSIS — I73.9 CLAUDICATION (HCC): ICD-10-CM

## 2019-02-27 PROCEDURE — 99495 TRANSJ CARE MGMT MOD F2F 14D: CPT | Performed by: PHYSICIAN ASSISTANT

## 2019-02-27 NOTE — PATIENT INSTRUCTIONS
As reviewed today please continue all medications from the cardiologist including Lasix 80 mg daily  Please continue to log her weights on her form  You confirm you have an appointment on Friday March 1st with the Heart failure Center at [facility]   We also reviewed the importance that if you have any significant increase in her weight in 1 day to call them as well  You report you are following low sodium and fluid restriction diet  As reviewed today please schedule an appointment for re-evaluation of her sleep apnea, schedule the pulmonary testing to evaluate her lung function  Please schedule the EMG for further evaluation of the pain in bilateral legs to help determine if this is strictly from diabetes or other causes  Please continue the gabapentin  Please schedule an appointment with me in the next 3-4 weeks for follow-up and review

## 2019-02-27 NOTE — PROGRESS NOTES
Assessment/Plan:    No problem-specific Assessment & Plan notes found for this encounter  Diagnoses and all orders for this visit:    Obstructive sleep apnea  -     Ambulatory referral to Sleep Medicine; Future    Benign essential hypertension    Claudication (HCC)  -     EMG 2 limb lower extremity; Future    Type 2 diabetes mellitus with other circulatory complication, without long-term current use of insulin (HCC)  -     EMG 2 limb lower extremity; Future    Acute bilateral low back pain with sciatica, sciatica laterality unspecified    Chronic bilateral low back pain, with sciatica presence unspecified  -     EMG 2 limb lower extremity; Future    Acute diastolic heart failure (HCC)    Short of breath on exertion  -     Complete pulmonary function test; Future    Other orders  -     carvedilol (COREG) 6 25 mg tablet; Take 6 25 mg by mouth  -     furosemide (LASIX) 40 mg tablet; Take 80 mg by mouth  -     gabapentin (NEURONTIN) 300 mg capsule; Take 300 mg by mouth  -     lisinopril (ZESTRIL) 20 mg tablet; Take 20 mg by mouth  -     potassium chloride (K-DUR,KLOR-CON) 20 mEq tablet; Take 40 mEq by mouth  -     ammonium lactate (LAC-HYDRIN) 12 % cream; Apply topically  -     lovastatin (MEVACOR) 20 mg tablet; Take 10 mg by mouth          Subjective:      Patient ID: Mariella Young  is a 63 y o  male  Patient is here for hospital follow up  Was IP 2/7 to 2/15 at [facility]  Patient had originally presented to our office on February 7th with concerned that he had cellulitis again  Patient had been in hospital in October for cellulitis of lower extremity  When patient was evaluated please also see the note from February 7th it was clear patient did not have cellulitis but was in fact volume overloaded  Patient was agreeable to admission to the hospital however he requested to go to [facility] as it was closer to his home  Patient was admitted to the hospital for fluid overload on 2/7/19       He has been following with cardiology and next appt is 3/1    Neurontin 300 mg taking - has improved symptoms  Still having pain in the bottom of feet and calves but better than before  Patient has been walking a lot  He has been walking around super markets  By the end he has a lot of pain in his calves and has to stop  After resting for about 5 minutes, the pain goes away  He has associated shortness of breath  Patient can walk two blocks before the pain in his legs is too severe to keep going  He has some pain in his low back as well  This only happens when he walks  These symptoms all started in November  Patient states he has been slowly gaining the weight back from the hospital  256 when left hospital and he is now 272 this morning nude  He has not been calling the cardiologist with the increased weight  He documents all his weights and brings the sheet to the cardiologist    Patient has been avoiding salt  He has been eating brown rice, broccoli, and fish  Patient is taking two lasix and two potassium in the morning everyday as prescribed  He is taking the carvedilol as well  Patient confirms that he stop the lisinopril/hydrochlorothiazide and is taking the lisinopril tablet only  Yesterday patient urinated 12 times, since starting lasix he usually urinates 8 times a day  He will go once or twice at night, this is typical for him  Fluids to 1500  He uses a measure device to keep track of fluids  Last night the patient noted feeling lightheaded for about an hour  He did not pass out  He thinks this is a side effect of the medication  Patient is taking metformin once a day  Patient does admit that he has not used his CPAP in the last 2 nights is he is awaiting new tubing for his machine  We also reviewed that I am referring him for a re-evaluation for his sleep apnea as he has not had any recent evaluations    In light of new onset heart failure we want to make sure that his settings are correct  Also placed a referral for pulmonary function testing  As there has been no obvious abnormality on echocardiogram I want to make sure that there are no lung conditions that are contributing to your heart condition  We also reviewed that the pain in her bilateral legs and feet can be due to diabetes  You have noted some improvement with gabapentin  You also did have arterial Dopplers completed at [facility] with no abnormality  You report you had a x-ray of her spine at [facility] and was told everything was normal   We reviewed that the x-ray does not definitively rule out lumbar radiculopathy as you also have chronic low back pain but the EMG will help us determine the cause of your pain  While leaning forward does not specifically reduce the pain in his back resting his arms does seem to help a little bit  Will need to review x-rays of his spine as he may have some level of stenosis  TCM Call (since 1/27/2019)     Date and time call was made  2/19/2019  3:22 PM    Hospital care reviewed  Records reviewed    Patient was hospitialized at  [facility]    Date of Admission  02/07/19    Date of discharge  02/15/19    Diagnosis  SOB AND LEG SWELLING    Disposition  Home PT  LIVES ALONE    Were the patients medications reviewed and updated  Yes    Current Symptoms  None      TCM Call (since 1/27/2019)     Post hospital issues  None    Should patient be enrolled in anticoag monitoring? No    Scheduled for follow up? Yes 2/27/19 WITH SANDRA GLSAS    Patients specialists  Cardiologist    Cardiologist name  Regla Omalley    Did you obtain your prescribed medications  Yes    Do you need help managing your prescriptions or medications  No PT   IS   ABLE TO MANAGE HIS OWN MEDS    Is transportation to your appointment needed  No PT  DRIVES    I have advised the patient to call PCP with any new or worsening symptoms    Kary Wade LPN    Living Arrangements Alone    Are you recieving any outpatient services  No    Are you recieving home care services  No    Are you using any community resources  No    Have you fallen in the last 12 months  No    Interperter language line needed  No    Counseling  Patient    Counseling topics  instructions for management; Importance of RX   compliance          The following portions of the patient's history were reviewed and updated as appropriate: allergies, current medications, past family history, past medical history, past social history, past surgical history and problem list     Review of Systems   Constitutional: Negative for chills and fever  HENT: Negative  Eyes: Negative for visual disturbance  Respiratory: Positive for shortness of breath  Negative for cough, chest tightness and wheezing  Cardiovascular: Positive for leg swelling  Negative for chest pain and palpitations  Gastrointestinal: Negative  Genitourinary: Positive for enuresis and frequency  Musculoskeletal: Positive for back pain  Feet pain  Skin: Positive for color change (lower leg redness)  Negative for rash  Neurological: Positive for light-headedness  Negative for dizziness, syncope, weakness, numbness and headaches  Psychiatric/Behavioral: Negative  Objective:      BP 90/74 (BP Location: Right arm, Patient Position: Sitting, Cuff Size: Large)   Pulse 80   Temp 97 6 °F (36 4 °C) (Oral)   Ht 5' 5" (1 651 m)   Wt 125 kg (274 lb 11 1 oz)   BMI 45 71 kg/m²          Physical Exam   Constitutional: He is oriented to person, place, and time  He appears well-developed and well-nourished  No distress  HENT:   Head: Normocephalic and atraumatic  Neck: Neck supple  No JVD present  Carotid bruit is not present  No thyromegaly present  Cardiovascular: Normal rate, regular rhythm, normal heart sounds and intact distal pulses  Exam reveals no gallop and no friction rub  No murmur heard    Pulmonary/Chest: Effort normal and breath sounds normal  No stridor  No respiratory distress  He has no wheezes  He has no rales  Abdominal: Soft  Bowel sounds are normal  He exhibits no mass  There is no tenderness  There is no rebound and no guarding  Musculoskeletal: He exhibits edema (2+ but this is improvement as edema was above knees at initial presentation)  Neurological: He is alert and oriented to person, place, and time  Skin: He is not diaphoretic  Bilateral elbows with thickened somewhat callused skin  Patient reports that secondary to back pain and his leg pain he will often rest his arms on a counter or table or the arms of his chairs

## 2019-03-04 ENCOUNTER — APPOINTMENT (OUTPATIENT)
Dept: LAB | Facility: HOSPITAL | Age: 59
End: 2019-03-04
Payer: COMMERCIAL

## 2019-03-04 DIAGNOSIS — E66.01 MORBID OBESITY (HCC): ICD-10-CM

## 2019-03-04 DIAGNOSIS — I10 ESSENTIAL HYPERTENSION, MALIGNANT: ICD-10-CM

## 2019-03-04 DIAGNOSIS — I50.31 ACUTE DIASTOLIC HEART FAILURE (HCC): ICD-10-CM

## 2019-03-04 LAB
ANION GAP SERPL CALCULATED.3IONS-SCNC: 3 MMOL/L (ref 4–13)
BUN SERPL-MCNC: 24 MG/DL (ref 5–25)
CALCIUM SERPL-MCNC: 8.7 MG/DL (ref 8.3–10.1)
CHLORIDE SERPL-SCNC: 103 MMOL/L (ref 100–108)
CO2 SERPL-SCNC: 31 MMOL/L (ref 21–32)
CREAT SERPL-MCNC: 0.93 MG/DL (ref 0.6–1.3)
GFR SERPL CREATININE-BSD FRML MDRD: 91 ML/MIN/1.73SQ M
GLUCOSE P FAST SERPL-MCNC: 89 MG/DL (ref 65–99)
POTASSIUM SERPL-SCNC: 4.3 MMOL/L (ref 3.5–5.3)
SODIUM SERPL-SCNC: 137 MMOL/L (ref 136–145)

## 2019-03-04 PROCEDURE — 36415 COLL VENOUS BLD VENIPUNCTURE: CPT

## 2019-03-04 PROCEDURE — 80048 BASIC METABOLIC PNL TOTAL CA: CPT

## 2019-03-06 ENCOUNTER — HOSPITAL ENCOUNTER (OUTPATIENT)
Dept: PULMONOLOGY | Facility: HOSPITAL | Age: 59
Discharge: HOME | End: 2019-03-06
Payer: COMMERCIAL

## 2019-03-06 DIAGNOSIS — R06.02 SHORT OF BREATH ON EXERTION: ICD-10-CM

## 2019-03-06 PROCEDURE — 94729 DIFFUSING CAPACITY: CPT

## 2019-03-06 PROCEDURE — 94726 PLETHYSMOGRAPHY LUNG VOLUMES: CPT

## 2019-03-06 PROCEDURE — 94060 EVALUATION OF WHEEZING: CPT | Performed by: INTERNAL MEDICINE

## 2019-03-06 PROCEDURE — 94060 EVALUATION OF WHEEZING: CPT

## 2019-03-06 PROCEDURE — 94760 N-INVAS EAR/PLS OXIMETRY 1: CPT

## 2019-03-06 PROCEDURE — 94729 DIFFUSING CAPACITY: CPT | Performed by: INTERNAL MEDICINE

## 2019-03-06 PROCEDURE — 94726 PLETHYSMOGRAPHY LUNG VOLUMES: CPT | Performed by: INTERNAL MEDICINE

## 2019-03-06 RX ORDER — ALBUTEROL SULFATE 2.5 MG/3ML
2.5 SOLUTION RESPIRATORY (INHALATION) ONCE
Status: COMPLETED | OUTPATIENT
Start: 2019-03-06 | End: 2019-03-06

## 2019-03-06 RX ADMIN — ALBUTEROL SULFATE 2.5 MG: 2.5 SOLUTION RESPIRATORY (INHALATION) at 11:17

## 2019-03-07 ENCOUNTER — TRANSCRIBE ORDERS (OUTPATIENT)
Dept: LAB | Facility: HOSPITAL | Age: 59
End: 2019-03-07

## 2019-03-07 ENCOUNTER — APPOINTMENT (OUTPATIENT)
Dept: LAB | Facility: HOSPITAL | Age: 59
End: 2019-03-07
Payer: COMMERCIAL

## 2019-03-07 DIAGNOSIS — I50.31 ACUTE DIASTOLIC HEART FAILURE (HCC): Primary | ICD-10-CM

## 2019-03-07 DIAGNOSIS — I50.31 ACUTE DIASTOLIC HEART FAILURE (HCC): ICD-10-CM

## 2019-03-07 LAB
ANION GAP SERPL CALCULATED.3IONS-SCNC: 7 MMOL/L (ref 4–13)
BUN SERPL-MCNC: 25 MG/DL (ref 5–25)
CALCIUM SERPL-MCNC: 8.5 MG/DL (ref 8.3–10.1)
CHLORIDE SERPL-SCNC: 97 MMOL/L (ref 100–108)
CO2 SERPL-SCNC: 30 MMOL/L (ref 21–32)
CREAT SERPL-MCNC: 1.02 MG/DL (ref 0.6–1.3)
GFR SERPL CREATININE-BSD FRML MDRD: 81 ML/MIN/1.73SQ M
GLUCOSE P FAST SERPL-MCNC: 184 MG/DL (ref 65–99)
POTASSIUM SERPL-SCNC: 3.9 MMOL/L (ref 3.5–5.3)
SODIUM SERPL-SCNC: 134 MMOL/L (ref 136–145)

## 2019-03-07 PROCEDURE — 36415 COLL VENOUS BLD VENIPUNCTURE: CPT

## 2019-03-07 PROCEDURE — 80048 BASIC METABOLIC PNL TOTAL CA: CPT

## 2019-03-14 ENCOUNTER — APPOINTMENT (OUTPATIENT)
Dept: LAB | Facility: HOSPITAL | Age: 59
End: 2019-03-14
Payer: COMMERCIAL

## 2019-03-14 ENCOUNTER — TRANSCRIBE ORDERS (OUTPATIENT)
Dept: LAB | Facility: HOSPITAL | Age: 59
End: 2019-03-14

## 2019-03-14 DIAGNOSIS — I50.31 ACUTE DIASTOLIC HEART FAILURE (HCC): Primary | ICD-10-CM

## 2019-03-14 DIAGNOSIS — I50.31 ACUTE DIASTOLIC HEART FAILURE (HCC): ICD-10-CM

## 2019-03-14 LAB — NT-PROBNP SERPL-MCNC: <5 PG/ML

## 2019-03-14 PROCEDURE — 36415 COLL VENOUS BLD VENIPUNCTURE: CPT

## 2019-03-14 PROCEDURE — 83880 ASSAY OF NATRIURETIC PEPTIDE: CPT

## 2019-03-20 ENCOUNTER — TRANSCRIBE ORDERS (OUTPATIENT)
Dept: LAB | Facility: HOSPITAL | Age: 59
End: 2019-03-20

## 2019-03-20 ENCOUNTER — APPOINTMENT (OUTPATIENT)
Dept: LAB | Facility: HOSPITAL | Age: 59
End: 2019-03-20
Payer: COMMERCIAL

## 2019-03-20 DIAGNOSIS — E66.01 CLASS 3 SEVERE OBESITY DUE TO EXCESS CALORIES WITHOUT SERIOUS COMORBIDITY IN ADULT, UNSPECIFIED BMI (HCC): ICD-10-CM

## 2019-03-20 DIAGNOSIS — I10 ESSENTIAL HYPERTENSION, MALIGNANT: ICD-10-CM

## 2019-03-20 DIAGNOSIS — I50.31 ACUTE DIASTOLIC HEART FAILURE (HCC): Primary | ICD-10-CM

## 2019-03-20 LAB
ANION GAP SERPL CALCULATED.3IONS-SCNC: 4 MMOL/L (ref 4–13)
BUN SERPL-MCNC: 27 MG/DL (ref 5–25)
CALCIUM SERPL-MCNC: 9 MG/DL (ref 8.3–10.1)
CHLORIDE SERPL-SCNC: 95 MMOL/L (ref 100–108)
CO2 SERPL-SCNC: 33 MMOL/L (ref 21–32)
CREAT SERPL-MCNC: 0.93 MG/DL (ref 0.6–1.3)
GFR SERPL CREATININE-BSD FRML MDRD: 91 ML/MIN/1.73SQ M
GLUCOSE SERPL-MCNC: 191 MG/DL (ref 65–140)
POTASSIUM SERPL-SCNC: 4.3 MMOL/L (ref 3.5–5.3)
SODIUM SERPL-SCNC: 132 MMOL/L (ref 136–145)

## 2019-03-20 PROCEDURE — 36415 COLL VENOUS BLD VENIPUNCTURE: CPT

## 2019-03-20 PROCEDURE — 80048 BASIC METABOLIC PNL TOTAL CA: CPT

## 2019-03-21 RX ORDER — TORSEMIDE 20 MG/1
40 TABLET ORAL 2 TIMES DAILY
COMMUNITY
Start: 2019-03-14 | End: 2019-10-31

## 2019-03-21 RX ORDER — LOVASTATIN 10 MG/1
10 TABLET ORAL
COMMUNITY
Start: 2019-03-14 | End: 2019-03-22 | Stop reason: ALTCHOICE

## 2019-03-21 RX ORDER — ATORVASTATIN CALCIUM 40 MG/1
40 TABLET, FILM COATED ORAL
COMMUNITY
Start: 2019-03-21 | End: 2019-06-21 | Stop reason: SDUPTHER

## 2019-03-22 ENCOUNTER — OFFICE VISIT (OUTPATIENT)
Dept: INTERNAL MEDICINE CLINIC | Facility: CLINIC | Age: 59
End: 2019-03-22

## 2019-03-22 VITALS
HEART RATE: 80 BPM | DIASTOLIC BLOOD PRESSURE: 80 MMHG | SYSTOLIC BLOOD PRESSURE: 126 MMHG | HEIGHT: 65 IN | WEIGHT: 276.9 LBS | BODY MASS INDEX: 46.13 KG/M2 | TEMPERATURE: 97.7 F

## 2019-03-22 DIAGNOSIS — I10 BENIGN ESSENTIAL HYPERTENSION: ICD-10-CM

## 2019-03-22 DIAGNOSIS — E11.9 TYPE 2 DIABETES MELLITUS WITHOUT COMPLICATION, WITHOUT LONG-TERM CURRENT USE OF INSULIN (HCC): Primary | ICD-10-CM

## 2019-03-22 DIAGNOSIS — R60.0 BILATERAL LOWER EXTREMITY EDEMA: ICD-10-CM

## 2019-03-22 DIAGNOSIS — G47.33 OBSTRUCTIVE SLEEP APNEA: ICD-10-CM

## 2019-03-22 DIAGNOSIS — E78.00 HYPERCHOLESTEROLEMIA: ICD-10-CM

## 2019-03-22 DIAGNOSIS — L85.3 XEROSIS OF SKIN: ICD-10-CM

## 2019-03-22 PROBLEM — R06.02 SHORT OF BREATH ON EXERTION: Status: RESOLVED | Noted: 2019-02-27 | Resolved: 2019-03-22

## 2019-03-22 PROBLEM — B35.1 ONYCHOMYCOSIS: Status: RESOLVED | Noted: 2017-04-07 | Resolved: 2019-03-22

## 2019-03-22 PROBLEM — Z11.4 ENCOUNTER FOR SCREENING FOR HIV: Status: RESOLVED | Noted: 2018-08-02 | Resolved: 2019-03-22

## 2019-03-22 PROBLEM — I50.31 ACUTE DIASTOLIC HEART FAILURE (HCC): Status: RESOLVED | Noted: 2019-02-27 | Resolved: 2019-03-22

## 2019-03-22 PROBLEM — I73.9 CLAUDICATION (HCC): Status: RESOLVED | Noted: 2019-02-27 | Resolved: 2019-03-22

## 2019-03-22 PROCEDURE — 99213 OFFICE O/P EST LOW 20 MIN: CPT | Performed by: INTERNAL MEDICINE

## 2019-03-22 RX ORDER — AMMONIUM LACTATE 12 G/100G
LOTION TOPICAL 2 TIMES DAILY
Qty: 400 G | Refills: 5 | Status: SHIPPED | OUTPATIENT
Start: 2019-03-22 | End: 2021-05-11 | Stop reason: SDUPTHER

## 2019-03-22 RX ORDER — GABAPENTIN 300 MG/1
300 CAPSULE ORAL
OUTPATIENT
Start: 2019-03-22

## 2019-03-22 NOTE — PATIENT INSTRUCTIONS
Please continue your metformin and atorvastatin daily  Please get your follow-up labs as dated in 3 months  Please continue follow-up and tapering dose of your torsemide currently at 40 mg twice a day  Confirmed you have a follow-up appointment with your cardiologist on March 26th  As discussed you plan to look for the compression socks in the stores that we discussed  You are aware that some walking as well as keeping her legs elevated will also help  You report compliance with low sodium and fluid restrictions  As discussed if you feel that the pain in her legs has returned you will call here so we can submit a new prescription for the gabapentin  You report you are to have the referral to schedule with weight management  Obesity   AMBULATORY CARE:   Obesity  is when your body mass index (BMI) is greater than 30  Your healthcare provider will use your height and weight to measure your BMI  The risks of obesity include  many health problems, such as injuries or physical disability  You may need tests to check for the following:  · Diabetes     · High blood pressure or high cholesterol     · Heart disease     · Gallbladder or liver disease     · Cancer of the colon, breast, prostate, liver, or kidney     · Sleep apnea     · Arthritis or gout  Seek care immediately if:   · You have a severe headache, confusion, or difficulty speaking  · You have weakness on one side of your body  · You have chest pain, sweating, or shortness of breath  Contact your healthcare provider if:   · You have symptoms of gallbladder or liver disease, such as pain in your upper abdomen  · You have knee or hip pain and discomfort while walking  · You have symptoms of diabetes, such as intense hunger and thirst, and frequent urination  · You have symptoms of sleep apnea, such as snoring or daytime sleepiness  · You have questions or concerns about your condition or care    Treatment for obesity  focuses on helping you lose weight to improve your health  Even a small decrease in BMI can reduce the risk for many health problems  Your healthcare provider will help you set a weight-loss goal   · Lifestyle changes  are the first step in treating obesity  These include making healthy food choices and getting regular physical activity  Your healthcare provider may suggest a weight-loss program that involves coaching, education, and therapy  · Medicine  may help you lose weight when it is used with a healthy diet and physical activity  · Surgery  can help you lose weight if you are very obese and have other health problems  There are several types of weight-loss surgery  Ask your healthcare provider for more information  Be successful losing weight:   · Set small, realistic goals  An example of a small goal is to walk for 20 minutes 5 days a week  Anther goal is to lose 5% of your body weight  · Tell friends, family members, and coworkers about your goals  and ask for their support  Ask a friend to lose weight with you, or join a weight-loss support group  · Identify foods or triggers that may cause you to overeat , and find ways to avoid them  Remove tempting high-calorie foods from your home and workplace  Place a bowl of fresh fruit on your kitchen counter  If stress causes you to eat, then find other ways to cope with stress  · Keep a diary to track what you eat and drink  Also write down how many minutes of physical activity you do each day  Weigh yourself once a week and record it in your diary  Eating changes: You will need to eat 500 to 1,000 fewer calories each day than you currently eat to lose 1 to 2 pounds a week  The following changes will help you cut calories:  · Eat smaller portions  Use small plates, no larger than 9 inches in diameter  Fill your plate half full of fruits and vegetables  Measure your food using measuring cups until you know what a serving size looks like       · Eat 3 meals and 1 or 2 snacks each day  Plan your meals in advance  [place] and eat at home most of the time  Eat slowly  · Eat fruits and vegetables at every meal   They are low in calories and high in fiber, which makes you feel full  Do not add butter, margarine, or cream sauce to vegetables  Use herbs to season steamed vegetables  · Eat less fat and fewer fried foods  Eat more baked or grilled chicken and fish  These protein sources are lower in calories and fat than red meat  Limit fast food  Dress your salads with olive oil and vinegar instead of bottled dressing  · Limit the amount of sugar you eat  Do not drink sugary beverages  Limit alcohol  Activity changes:  Physical activity is good for your body in many ways  It helps you burn calories and build strong muscles  It decreases stress and depression, and improves your mood  It can also help you sleep better  Talk to your healthcare provider before you begin an exercise program   · Exercise for at least 30 minutes 5 days a week  Start slowly  Set aside time each day for physical activity that you enjoy and that is convenient for you  It is best to do both weight training and an activity that increases your heart rate, such as walking, bicycling, or swimming  · Find ways to be more active  Do yard work and housecleaning  Walk up the stairs instead of using elevators  Spend your leisure time going to events that require walking, such as outdoor festivals or fairs  This extra physical activity can help you lose weight and keep it off  Follow up with your healthcare provider as directed: You may need to meet with a dietitian  Write down your questions so you remember to ask them during your visits  © 2017 [facility] Information is for End User's use only and may not be sold, redistributed or otherwise used for commercial purposes   All illustrations and images included in CareNotes® are the copyrighted property of A D A M , Inc  or Elver Rogers  The above information is an  only  It is not intended as medical advice for individual conditions or treatments  Talk to your doctor, nurse or pharmacist before following any medical regimen to see if it is safe and effective for you  Low Fat Diet   AMBULATORY CARE:   A low-fat diet  is an eating plan that is low in total fat, unhealthy fat, and cholesterol  You may need to follow a low-fat diet if you have trouble digesting or absorbing fat  You may also need to follow this diet if you have high cholesterol  You can also lower your cholesterol by increasing the amount of fiber in your diet  Soluble fiber is a type of fiber that helps to decrease cholesterol levels  Different types of fat in food:   · Limit unhealthy fats  A diet that is high in cholesterol, saturated fat, and trans fat may cause unhealthy cholesterol levels  Unhealthy cholesterol levels increase your risk of heart disease  ¨ Cholesterol:  Limit intake of cholesterol to less than 200 mg per day  Cholesterol is found in meat, eggs, and dairy  ¨ Saturated fat:  Limit saturated fat to less than 7% of your total daily calories  Ask your dietitian how many calories you need each day  Saturated fat is found in butter, cheese, ice cream, whole milk, and palm oil  Saturated fat is also found in meat, such as beef, pork, chicken skin, and processed meats  Processed meats include sausage, hot dogs, and bologna  ¨ Trans fat:  Avoid trans fat as much as possible  Trans fat is used in fried and baked foods  Foods that say trans fat free on the label may still have up to 0 5 grams of trans fat per serving  · Include healthy fats  Replace foods that are high in saturated and trans fat with foods high in healthy fats  This may help to decrease high cholesterol levels  ¨ Monounsaturated fats:   These are found in avocados, nuts, and vegetable oils, such as olive, canola, and sunflower oil     ¨ Polyunsaturated fats: These can be found in vegetable oils, such as soybean or corn oil  Omega-3 fats can help to decrease the risk of heart disease  Omega-3 fats are found in fish, such as salmon, herring, trout, and tuna  Omega-3 fats can also be found in plant foods, such as walnuts, flaxseed, soybeans, and canola oil    Foods to limit or avoid:   · Grains:      ¨ Snacks that are made with partially hydrogenated oils, such as chips, regular crackers, and butter-flavored popcorn    ¨ High-fat baked goods, such as biscuits, croissants, doughnuts, pies, cookies, and pastries    · Dairy:      ¨ Whole milk, 2% milk, and yogurt and ice cream made with whole milk    ¨ Half and half creamer, heavy cream, and whipping cream    ¨ Cheese, cream cheese, and sour cream    · Meats and proteins:      ¨ High-fat cuts of meat (T-bone steak, regular hamburger, and ribs)    ¨ Fried meat, poultry (turkey and chicken), and fish    ¨ Poultry (chicken and turkey) with skin    ¨ Cold cuts (salami or bologna), hot dogs, schultz, and sausage    ¨ Whole eggs and egg yolks    · Vegetables and fruits with added fat:      ¨ Fried vegetables or vegetables in butter or high-fat sauces, such as cream or cheese sauces    ¨ Fried fruit or fruit served with butter or cream    · Fats:      ¨ Butter, stick margarine, and shortening    ¨ Coconut, palm oil, and palm kernel oil  Foods to include:   · Grains:      ¨ Whole-grain breads, cereals, pasta, and brown rice    ¨ Low-fat crackers and pretzels    · Vegetables and fruits:      ¨ Fresh, frozen, or canned vegetables (no salt or low-sodium)    ¨ Fresh, frozen, dried, or canned fruit (canned in light syrup or fruit juice)    ¨ Avocado    · Low-fat dairy products:      ¨ Nonfat (skim) or 1% milk    ¨ Nonfat or low-fat cheese, yogurt, and cottage cheese    · Meats and proteins:      ¨ Chicken or turkey with no skin    ¨ Baked or broiled fish    ¨ Lean beef and pork (loin, round, extra lean hamburger)    ¨ Beans and peas, unsalted nuts, soy products    ¨ Egg whites and substitutes    ¨ Seeds and nuts    · Fats:      ¨ Unsaturated oil, such as canola, olive, peanut, soybean, or sunflower oil    ¨ Soft or liquid margarine and vegetable oil spread    ¨ Low-fat salad dressing  Other ways to decrease fat:   · Read food labels before you buy foods  Choose foods that have less than 30% of calories from fat  Choose low-fat or fat-free dairy products  Remember that fat free does not mean calorie free  These foods still contain calories, and too many calories can lead to weight gain  · Trim fat from meat and avoid fried food  Trim all visible fat from meat before you cook it  Remove the skin from poultry  Do not sanders meat, fish, or poultry  Bake, roast, boil, or broil these foods instead  Avoid fried foods  Eat a baked potato instead of [place] fries  Steam vegetables instead of sautéing them in butter  · Add less fat to foods  Use imitation schultz bits on salads and baked potatoes instead of regular schultz bits  Use fat-free or low-fat salad dressings instead of regular dressings  Use low-fat or nonfat butter-flavored topping instead of regular butter or margarine on popcorn and other foods  Ways to decrease fat in recipes:  Replace high-fat ingredients with low-fat or nonfat ones  This may cause baked goods to be drier than usual  You may need to use nonfat cooking spray on pans to prevent food from sticking  You also may need to change the amount of other ingredients, such as water, in the recipe  Try the following:  · Use low-fat or light margarine instead of regular margarine or shortening  · Use lean ground turkey breast or chicken, or lean ground beef (less than 5% fat) instead of hamburger  · Add 1 teaspoon of canola oil to 8 ounces of skim milk instead of using cream or half and half  · Use grated zucchini, carrots, or apples in breads instead of coconut      · Use blenderized, low-fat cottage cheese, plain tofu, or low-fat ricotta cheese instead of cream cheese  · Use 1 egg white and 1 teaspoon of canola oil, or use ¼ cup (2 ounces) of fat-free egg substitute instead of a whole egg  · Replace half of the oil that is called for in a recipe with applesauce when you bake  Use 3 tablespoons of cocoa powder and 1 tablespoon of canola oil instead of a square of baking chocolate  How to increase fiber:  Eat enough high-fiber foods to get 20 to 30 grams of fiber every day  Slowly increase your fiber intake to avoid stomach cramps, gas, and other problems  · Eat 3 ounces of whole-grain foods each day  An ounce is about 1 slice of bread  Eat whole-grain breads, such as whole-wheat bread  Whole wheat, whole-wheat flour, or other whole grains should be listed as the first ingredient on the food label  Replace white flour with whole-grain flour or use half of each in recipes  Whole-grain flour is heavier than white flour, so you may have to add more yeast or baking powder  · Eat a high-fiber cereal for breakfast   Oatmeal is a good source of soluble fiber  Look for cereals that have bran or fiber in the name  Choose whole-grain products, such as brown rice, barley, and whole-wheat pasta  · Eat more beans, peas, and lentils  For example, add beans to soups or salads  Eat at least 5 cups of fruits and vegetables each day  Eat fruits and vegetables with the peel because the peel is high in fiber  © 2017 [address]  Information is for End User's use only and may not be sold, redistributed or otherwise used for commercial purposes  All illustrations and images included in CareNotes® are the copyrighted property of [employer]  or Elver Rogers  The above information is an  only  It is not intended as medical advice for individual conditions or treatments   Talk to your doctor, nurse or pharmacist before following any medical regimen to see if it is safe and effective for you  Heart Healthy Diet   AMBULATORY CARE:   A heart healthy diet  is an eating plan low in total fat, unhealthy fats, and sodium (salt)  A heart healthy diet helps decrease your risk for heart disease and stroke  Limit the amount of fat you eat to 25% to 35% of your total daily calories  Limit sodium to less than 2,300 mg each day  Healthy fats:  Healthy fats can help improve cholesterol levels  The risk for heart disease is decreased when cholesterol levels are normal  Choose healthy fats, such as the following:  · Unsaturated fat  is found in foods such as soybean, canola, olive, corn, and safflower oils  It is also found in soft tub margarine that is made with liquid vegetable oil  · Omega-3 fat  is found in certain fish, such as salmon, tuna, and trout, and in walnuts and flaxseed  Unhealthy fats:  Unhealthy fats can cause unhealthy cholesterol levels in your blood and increase your risk of heart disease  Limit unhealthy fats, such as the following:  · Cholesterol  is found in animal foods, such as eggs and lobster, and in dairy products made from whole milk  Limit cholesterol to less than 300 milligrams (mg) each day  You may need to limit cholesterol to 200 mg each day if you have heart disease  · Saturated fat  is found in meats, such as schultz and hamburger  It is also found in chicken or turkey skin, whole milk, and butter  Limit saturated fat to less than 7% of your total daily calories  Limit saturated fat to less than 6% if you have heart disease or are at increased risk for it  · Trans fat  is found in packaged foods, such as potato chips and cookies  It is also in hard margarine, some fried foods, and shortening  Avoid trans fats as much as possible    Heart healthy foods and drinks to include:  Ask your dietitian or healthcare provider how many servings to have from each of the following food groups:  · Grains:      ¨ Whole-wheat breads, cereals, and pastas, and brown rice    ¨ Low-fat, low-sodium crackers and chips    · Vegetables:      ¨ Broccoli, green beans, green peas, and spinach    ¨ Collards, kale, and lima beans    ¨ Carrots, sweet potatoes, tomatoes, and peppers    ¨ Canned vegetables with no salt added    · Fruits:      ¨ Bananas, peaches, pears, and pineapple    ¨ Grapes, raisins, and dates    ¨ Oranges, tangerines, grapefruit, orange juice, and grapefruit juice    ¨ Apricots, mangoes, melons, and papaya    ¨ Raspberries and strawberries    ¨ Canned fruit with no added sugar    · Low-fat dairy products:      ¨ Nonfat (skim) milk, 1% milk, and low-fat almond, cashew, or soy milks fortified with calcium    ¨ Low-fat cheese, regular or frozen yogurt, and cottage cheese    · Meats and proteins , such as lean cuts of beef and pork (loin, leg, round), skinless chicken and turkey, legumes, soy products, egg whites, and nuts  Foods and drinks to limit or avoid:  Ask your dietitian or healthcare provider about these and other foods that are high in unhealthy fat, sodium, and sugar:  · Snack or packaged foods , such as frozen dinners, cookies, macaroni and cheese, and cereals with more than 300 mg of sodium per serving    · Canned or dry mixes  for cakes, soups, sauces, or gravies    · Vegetables with added sodium , such as instant potatoes, vegetables with added sauces, or regular canned vegetables    · Other foods high in sodium , such as ketchup, barbecue sauce, salad dressing, pickles, olives, soy sauce, and miso    · High-fat dairy foods  such as whole or 2% milk, cream cheese, or sour cream, and cheeses     · High-fat protein foods  such as high-fat cuts of beef (T-bone steaks, ribs), chicken or turkey with skin, and organ meats, such as liver    · Cured or smoked meats , such as hot dogs, schultz, and sausage    · Unhealthy fats and oils , such as butter, stick margarine, shortening, and cooking oils such as coconut or palm oil    · Food and drinks high in sugar , such as soft drinks (soda), sports drinks, sweetened tea, candy, cake, cookies, pies, and doughnuts  Other diet guidelines to follow:   · Eat more foods containing omega-3 fats  Eat fish high in omega-3 fats at least 2 times a week  · Limit alcohol  Too much alcohol can damage your heart and raise your blood pressure  Women should limit alcohol to 1 drink a day  Men should limit alcohol to 2 drinks a day  A drink of alcohol is 12 ounces of beer, 5 ounces of wine, or 1½ ounces of liquor  · Choose low-sodium foods  High-sodium foods can lead to high blood pressure  Add little or no salt to food you prepare  Use herbs and spices in place of salt  · Eat more fiber  to help lower cholesterol levels  Eat at least 5 servings of fruits and vegetables each day  Eat 3 ounces of whole-grain foods each day  Legumes (beans) are also a good source of fiber  Lifestyle guidelines:   · Do not smoke  Nicotine and other chemicals in cigarettes and cigars can cause lung and heart damage  Ask your healthcare provider for information if you currently smoke and need help to quit  E-cigarettes or smokeless tobacco still contain nicotine  Talk to your healthcare provider before you use these products  · Exercise regularly  to help you maintain a healthy weight and improve your blood pressure and cholesterol levels  Ask your healthcare provider about the best exercise plan for you  Do not start an exercise program without asking your healthcare provider  Follow up with your healthcare provider as directed:  Write down your questions so you remember to ask them during your visits  © 2017 [address] Information is for End User's use only and may not be sold, redistributed or otherwise used for commercial purposes  All illustrations and images included in CareNotes® are the copyrighted property of [employer]  or Elver Rogers  The above information is an  only   It is not intended as medical advice for individual conditions or treatments  Talk to your doctor, nurse or pharmacist before following any medical regimen to see if it is safe and effective for you  Calorie Counting Diet   WHAT YOU NEED TO KNOW:   What is a calorie counting diet? It is a meal plan based on counting calories each day to reach a healthy body weight  You will need to eat fewer calories if you are trying to lose weight  Weight loss may decrease your risk for certain health problems or improve your health if you have health problems  Some of these health problems include heart disease, high blood pressure, and diabetes  What foods should I avoid? Your dietitian will tell you if you need to avoid certain foods based on your body weight and health condition  You may need to avoid high-fat foods if you are at risk for or have heart disease  You may need to eat fewer foods from the breads and starches food group if you have diabetes  How many calories are in foods? The following is a list of foods and drinks with the approximate number of calories in each  Check the food label to find the exact number of calories  A dietitian can tell you how many calories you should have from each food group each day    · Carbohydrate:      ¨ ½ of a 3-inch bagel, 1 slice of bread, or ½ of a hamburger bun or hot dog bun (80)    ¨ 1 (8-inch) flour tortilla or ½ cup of cooked rice (100)    ¨ 1 (6-inch) corn tortilla (80)    ¨ 1 (6-inch) pancake or 1 cup of bran flakes cereal (110)    ¨ ½ cup of cooked cereal (80)    ¨ ½ cup of cooked pasta (85)    ¨ 1 ounce of pretzels (100)    ¨ 3 cups of air-popped popcorn without butter or oil (80)    · Dairy:      ¨ 1 cup of skim or 1% milk (90)    ¨ 1 cup of 2% milk (120)    ¨ 1 cup of whole milk (160)    ¨ 1 cup of 2% chocolate milk (220)    ¨ 1 ounce of low-fat cheese with 3 grams of fat per ounce (70)    ¨ 1 ounce of cheddar cheese (114)    ¨ ½ cup of 1% fat cottage cheese (80)    ¨ 1 cup of plain or sugar-free, fat-free yogurt (90)    · Protein foods:      ¨ 3 ounces of fish (not breaded or fried) (95)    ¨ 3 ounces of breaded, fried fish (195)    ¨ ¾ cup of tuna canned in water (105)    ¨ 3 ounces of chicken breast without skin (105)    ¨ 1 fried chicken breast with skin (350)    ¨ ¼ cup of fat free egg substitute (40)    ¨ 1 large egg (75)    ¨ 3 ounces of lean beef or pork (165)    ¨ 3 ounces of fried pork chop or ham (185)    ¨ ½ cup of cooked dried beans, such as kidney, howell, lentils, or navy (115)    ¨ 3 ounces of bologna or lunch meat (225)    ¨ 2 links of breakfast sausage (140)    · Vegetables:      ¨ ½ cup of sliced mushrooms (10)    ¨ 1 cup of salad greens, such as lettuce, spinach, or patricia (15)    ¨ ½ cup of steamed asparagus (20)    ¨ ½ cup of cooked summer squash, zucchini squash, or green or wax beans (25)    ¨ 1 cup of broccoli or cauliflower florets, or 1 medium tomato (25)    ¨ 1 large raw carrot or ½ cup of cooked carrots (40)    ¨ ? of a medium cucumber or 1 stalk of celery (5)    ¨ 1 small baked potato (160)    ¨ 1 cup of breaded, fried vegetables (230)    · Fruit:      ¨ 1 (6-inch) banana (55)     ¨ ½ of a 4-inch grapefruit (55)    ¨ 15 grapes (60)    ¨ 1 medium orange or apple (70)    ¨ 1 large peach (65)    ¨ 1 cup of fresh pineapple chunks (75)    ¨ 1 cup of melon cubes (50)    ¨ 1¼ cups of whole strawberries (45)    ¨ ½ cup of fruit canned in juice (55)    ¨ ½ cup of fruit canned in heavy syrup (110)    ¨ ?  cup of raisins (130)    ¨ ½ cup of unsweetened fruit juice (60)    ¨ ½ cup of grape, cranberry, or prune juice (90)    · Fat:      ¨ 10 peanuts or 2 teaspoons of peanut butter (55)    ¨ 2 tablespoons of avocado or 1 tablespoon of regular salad dressing (45)    ¨ 2 slices of schultz (90)    ¨ 1 teaspoon of oil, such as safflower, canola, corn, or olive oil (45)    ¨ 2 teaspoons of low-fat margarine, or 1 tablespoon of low-fat mayonnaise (50)    ¨ 1 teaspoon of regular margarine (40)    ¨ 1 tablespoon of regular mayonnaise (135)    ¨ 1 tablespoon of cream cheese or 2 tablespoons of low-fat cream cheese (45)    ¨ 2 tablespoons of vegetable shortening (215)    · Dessert and sweets:      ¨ 8 animal crackers or 5 vanilla wafers (80)    ¨ 1 frozen fruit juice bar (80)    ¨ ½ cup of ice milk or low-fat frozen yogurt (90)    ¨ ½ cup of sherbet or sorbet (125)    ¨ ½ cup of sugar-free pudding or custard (60)    ¨ ½ cup of ice cream (140)    ¨ ½ cup of pudding or custard (175)    ¨ 1 (2-inch) square chocolate brownie (185)    · Combination foods:      ¨ Bean burrito made with an 8-inch tortilla, without cheese (275)    ¨ Chicken breast sandwich with lettuce and tomato (325)    ¨ 1 cup of chicken noodle soup (60)    ¨ 1 beef taco (175)    ¨ Regular hamburger with lettuce and tomato (310)    ¨ Regular cheeseburger with lettuce and tomato (410)     ¨ ¼ of a 12-inch cheese pizza (280)    ¨ Fried fish sandwich with lettuce and tomato (425)    ¨ Hot dog and bun (275)    ¨ 1½ cups of macaroni and cheese (310)    ¨ Taco salad with a fried tortilla shell (870)    · Low-calorie foods:      ¨ 1 tablespoon of ketchup or 1 tablespoon of fat free sour cream (15)    ¨ 1 teaspoon of mustard (5)    ¨ ¼ cup of salsa (20)    ¨ 1 large dill pickle (15)    ¨ 1 tablespoon of fat free salad dressing (10)    ¨ 2 teaspoons of low-sugar, light jam or jelly, or 1 tablespoon of sugar-free syrup (15)    ¨ 1 sugar-free popsicle (15)    ¨ 1 cup of club soda, seltzer water, or diet soda (0)  CARE AGREEMENT:   You have the right to help plan your care  Discuss treatment options with your caregivers to decide what care you want to receive  You always have the right to refuse treatment  The above information is an  only  It is not intended as medical advice for individual conditions or treatments   Talk to your doctor, nurse or pharmacist before following any medical regimen to see if it is safe and effective for you  © 2017 [address] Information is for End User's use only and may not be sold, redistributed or otherwise used for commercial purposes  All illustrations and images included in CareNotes® are the copyrighted property of A D A M , Inc  or Elver Rogers

## 2019-03-22 NOTE — PROGRESS NOTES
Assessment/Plan:    No problem-specific Assessment & Plan notes found for this encounter  Diagnoses and all orders for this visit:    Type 2 diabetes mellitus without complication, without long-term current use of insulin ([facility])  -     Comprehensive metabolic panel; Future  -     Hemoglobin A1C; Future  -     Lipid Panel with Direct LDL reflex; Future    Bilateral lower extremity edema    Benign essential hypertension    Obstructive sleep apnea    Hypercholesterolemia    Xerosis of skin  -     ammonium lactate (AMLACTIN) 12 % lotion; Apply topically 2 (two) times a day    Other orders  -     torsemide (DEMADEX) 20 mg tablet; Take 40 mg by mouth 2 (two) times a day  -     Discontinue: lovastatin (MEVACOR) 10 MG tablet; Take 10 mg by mouth  -     atorvastatin (LIPITOR) 40 mg tablet; Take 40 mg by mouth          Subjective:      Patient ID: Adelaide Jackson  is a 63 y o  male  Pt here for follow up  States followed up with his cardio at [facility] and was advised Not heart failure and swelling due to venous insufficiency and morbid obesity  Has been advised on compression stockings and elevating legs  Patient reports he told the cardiologist that the compression stockings were not covered by his insurance  We did review that these are available without a prescription which can be ordered online or some of the department stores do carry them  Patient plans to go to the department stores and look for them this weekend  Was also changed to atorvastatin and stopped the lovastatin  Diuretics are being tapered down  Have not been discontinued as patient continues to have significant lower extremity swelling  Patient has follow-up with the cardiologist office on Tuesday March 26th  Patient remains compliant with his CPAP machine  Patient did complete the pulmonary function test and this was essentially normal other than a minimal amount of air trapping secondary to body habitus but no reversibility      Already has referral for weight management from his cardiologist    Patient reports after significant weight loss when inpatient with diuretics he has not had any additional weight loss  Patient reports is very compliant with his fluid and salt restrictions  He states he has been advised every time he goes to the cardiologist's office he has to bring in his food log which includes how many mg of sodium  Patient reports his sodium intake has been less than 1500 mg daily    At last visit patient was advised to decrease his torsemide to 40 mg twice daily and goal is stated that to keep him on 20 mg once a day  Patient was also reminded once again that order had been placed for his colonoscopy and he needs to contact GI to schedule this appointment  The following portions of the patient's history were reviewed and updated as appropriate: allergies, current medications, past family history, past medical history, past social history, past surgical history and problem list     Review of Systems   Constitutional: Negative for chills, fatigue and fever  Respiratory: Positive for shortness of breath (Continues to have some shortness of breath with hills or stairs but reports this has improved since initial hospitalization  )  Negative for cough and chest tightness  Cardiovascular: Positive for leg swelling  Negative for chest pain and palpitations  Gastrointestinal: Negative  Negative for abdominal pain  Genitourinary: Positive for frequency (Positive frequency on diuretics  )  Negative for urgency  Musculoskeletal: Positive for gait problem and myalgias  Patient was given gabapentin by the hospital for possible diabetic neuropathy  Patient states he has not had the medication in the past 3 or 4 days and has not noticed any change or worsening of pain  Patient's pain was not necessarily specific to diabetic neuropathy but likely also secondary to very significant lower extremity swelling     Skin: Positive for color change  Neurological: Negative for dizziness, light-headedness, numbness and headaches  Psychiatric/Behavioral: Negative  Objective:      /80 (BP Location: Left arm, Patient Position: Sitting, Cuff Size: Large)   Pulse 80   Temp 97 7 °F (36 5 °C) (Oral)   Ht 5' 5" (1 651 m)   Wt 126 kg (276 lb 14 4 oz)   BMI 46 08 kg/m²          Physical Exam   Constitutional: He is oriented to person, place, and time  He appears well-developed and well-nourished  HENT:   Head: Normocephalic  Neck: No JVD present  Cardiovascular: Normal rate, regular rhythm and normal heart sounds  Patient continues to have quite significant bilateral lower extremity swelling  This however has slightly improved as on initial presentation swelling was above his knees to distal thigh and now most of it remains below knees  Patient does have venous stasis mottling   Pulmonary/Chest: Effort normal and breath sounds normal  He has no wheezes  Abdominal: Soft  Bowel sounds are normal  There is no tenderness  Exam limited secondary to body habitus  Lymphadenopathy:     He has no cervical adenopathy  Neurological: He is alert and oriented to person, place, and time  Psychiatric: He has a normal mood and affect  His behavior is normal    Nursing note and vitals reviewed  BMI Counseling: Body mass index is 46 08 kg/m²  Discussed the patient's BMI with him  The BMI is above average  BMI counseling and education was provided to the patient  Nutrition recommendations include reducing portion sizes, decreasing overall calorie intake, 3-5 servings of fruits/vegetables daily, moderation in carbohydrate intake, reducing intake of saturated fat and trans fat and reducing intake of cholesterol  Exercise recommendations include exercising 3-5 times per week

## 2019-03-27 ENCOUNTER — APPOINTMENT (OUTPATIENT)
Dept: LAB | Facility: HOSPITAL | Age: 59
End: 2019-03-27
Payer: COMMERCIAL

## 2019-03-27 DIAGNOSIS — I50.31 ACUTE DIASTOLIC HEART FAILURE (HCC): ICD-10-CM

## 2019-03-27 DIAGNOSIS — I10 ESSENTIAL HYPERTENSION, MALIGNANT: ICD-10-CM

## 2019-03-27 DIAGNOSIS — E66.01 CLASS 3 SEVERE OBESITY DUE TO EXCESS CALORIES WITHOUT SERIOUS COMORBIDITY IN ADULT, UNSPECIFIED BMI (HCC): ICD-10-CM

## 2019-03-27 LAB
ANION GAP SERPL CALCULATED.3IONS-SCNC: 6 MMOL/L (ref 4–13)
BUN SERPL-MCNC: 24 MG/DL (ref 5–25)
CALCIUM SERPL-MCNC: 8.5 MG/DL (ref 8.3–10.1)
CHLORIDE SERPL-SCNC: 99 MMOL/L (ref 100–108)
CO2 SERPL-SCNC: 30 MMOL/L (ref 21–32)
CREAT SERPL-MCNC: 0.83 MG/DL (ref 0.6–1.3)
GFR SERPL CREATININE-BSD FRML MDRD: 98 ML/MIN/1.73SQ M
GLUCOSE P FAST SERPL-MCNC: 111 MG/DL (ref 65–99)
POTASSIUM SERPL-SCNC: 4.2 MMOL/L (ref 3.5–5.3)
SODIUM SERPL-SCNC: 135 MMOL/L (ref 136–145)

## 2019-03-27 PROCEDURE — 80048 BASIC METABOLIC PNL TOTAL CA: CPT

## 2019-03-27 PROCEDURE — 36415 COLL VENOUS BLD VENIPUNCTURE: CPT

## 2019-04-11 DIAGNOSIS — E11.9 TYPE 2 DIABETES MELLITUS WITHOUT COMPLICATION, WITHOUT LONG-TERM CURRENT USE OF INSULIN (HCC): ICD-10-CM

## 2019-04-12 DIAGNOSIS — E11.9 TYPE 2 DIABETES MELLITUS WITHOUT COMPLICATION, WITHOUT LONG-TERM CURRENT USE OF INSULIN (HCC): ICD-10-CM

## 2019-04-30 ENCOUNTER — OFFICE VISIT (OUTPATIENT)
Dept: INTERNAL MEDICINE CLINIC | Facility: CLINIC | Age: 59
End: 2019-04-30

## 2019-04-30 VITALS
WEIGHT: 281.75 LBS | TEMPERATURE: 98.1 F | HEART RATE: 80 BPM | DIASTOLIC BLOOD PRESSURE: 76 MMHG | HEIGHT: 65 IN | BODY MASS INDEX: 46.94 KG/M2 | SYSTOLIC BLOOD PRESSURE: 120 MMHG

## 2019-04-30 DIAGNOSIS — R60.0 BILATERAL LOWER EXTREMITY EDEMA: ICD-10-CM

## 2019-04-30 DIAGNOSIS — E11.9 TYPE 2 DIABETES MELLITUS WITHOUT COMPLICATION, WITHOUT LONG-TERM CURRENT USE OF INSULIN (HCC): ICD-10-CM

## 2019-04-30 DIAGNOSIS — L03.116 CELLULITIS OF LEFT LOWER EXTREMITY: Primary | Chronic | ICD-10-CM

## 2019-04-30 PROBLEM — L03.90 CELLULITIS: Chronic | Status: ACTIVE | Noted: 2018-12-03

## 2019-04-30 PROCEDURE — 99213 OFFICE O/P EST LOW 20 MIN: CPT | Performed by: HOSPITALIST

## 2019-04-30 RX ORDER — CEPHALEXIN 500 MG/1
500 CAPSULE ORAL EVERY 8 HOURS SCHEDULED
Qty: 30 CAPSULE | Refills: 0 | Status: SHIPPED | OUTPATIENT
Start: 2019-04-30 | End: 2019-05-10

## 2019-05-10 ENCOUNTER — OFFICE VISIT (OUTPATIENT)
Dept: INTERNAL MEDICINE CLINIC | Facility: CLINIC | Age: 59
End: 2019-05-10

## 2019-05-10 VITALS
WEIGHT: 282.85 LBS | TEMPERATURE: 97.8 F | HEIGHT: 65 IN | HEART RATE: 84 BPM | DIASTOLIC BLOOD PRESSURE: 70 MMHG | SYSTOLIC BLOOD PRESSURE: 120 MMHG | BODY MASS INDEX: 47.13 KG/M2

## 2019-05-10 DIAGNOSIS — I10 BENIGN ESSENTIAL HYPERTENSION: ICD-10-CM

## 2019-05-10 DIAGNOSIS — L97.919 ULCERS OF BOTH LOWER LEGS (HCC): Primary | Chronic | ICD-10-CM

## 2019-05-10 DIAGNOSIS — E66.01 CLASS 3 SEVERE OBESITY DUE TO EXCESS CALORIES WITH SERIOUS COMORBIDITY AND BODY MASS INDEX (BMI) OF 45.0 TO 49.9 IN ADULT (HCC): Chronic | ICD-10-CM

## 2019-05-10 DIAGNOSIS — L97.929 ULCERS OF BOTH LOWER LEGS (HCC): Primary | Chronic | ICD-10-CM

## 2019-05-10 PROBLEM — I87.2 VENOUS INSUFFICIENCY: Status: ACTIVE | Noted: 2019-03-26

## 2019-05-10 PROCEDURE — 99213 OFFICE O/P EST LOW 20 MIN: CPT | Performed by: INTERNAL MEDICINE

## 2019-05-10 RX ORDER — LISINOPRIL AND HYDROCHLOROTHIAZIDE 20; 12.5 MG/1; MG/1
TABLET ORAL
COMMUNITY
Start: 2019-05-06 | End: 2019-05-10 | Stop reason: ALTCHOICE

## 2019-05-19 DIAGNOSIS — E11.9 TYPE 2 DIABETES MELLITUS WITHOUT COMPLICATION, WITHOUT LONG-TERM CURRENT USE OF INSULIN (HCC): ICD-10-CM

## 2019-05-23 DIAGNOSIS — G47.61 PERIODIC LIMB MOVEMENTS OF SLEEP: ICD-10-CM

## 2019-05-24 RX ORDER — PRAMIPEXOLE DIHYDROCHLORIDE 0.12 MG/1
0.12 TABLET ORAL DAILY
Qty: 90 TABLET | Refills: 1 | Status: SHIPPED | OUTPATIENT
Start: 2019-05-24 | End: 2019-11-23 | Stop reason: SDUPTHER

## 2019-06-11 ENCOUNTER — TELEPHONE (OUTPATIENT)
Dept: INTERNAL MEDICINE CLINIC | Facility: CLINIC | Age: 59
End: 2019-06-11

## 2019-06-17 ENCOUNTER — TELEPHONE (OUTPATIENT)
Dept: INTERNAL MEDICINE CLINIC | Facility: CLINIC | Age: 59
End: 2019-06-17

## 2019-06-19 DIAGNOSIS — E11.9 TYPE 2 DIABETES MELLITUS WITHOUT COMPLICATION, WITHOUT LONG-TERM CURRENT USE OF INSULIN (HCC): Primary | ICD-10-CM

## 2019-06-19 DIAGNOSIS — G47.61 PERIODIC LIMB MOVEMENT SLEEP DISORDER: ICD-10-CM

## 2019-06-19 RX ORDER — GABAPENTIN 300 MG/1
300 CAPSULE ORAL 3 TIMES DAILY
Qty: 90 CAPSULE | Refills: 2 | Status: SHIPPED | OUTPATIENT
Start: 2019-06-19 | End: 2019-09-15 | Stop reason: SDUPTHER

## 2019-06-20 ENCOUNTER — TRANSCRIBE ORDERS (OUTPATIENT)
Dept: ADMINISTRATIVE | Facility: HOSPITAL | Age: 59
End: 2019-06-20

## 2019-06-20 ENCOUNTER — APPOINTMENT (OUTPATIENT)
Dept: WOUND CARE | Facility: HOSPITAL | Age: 59
End: 2019-06-20
Payer: COMMERCIAL

## 2019-06-20 DIAGNOSIS — L97.929 ULCERS OF BOTH LOWER LEGS (HCC): Chronic | ICD-10-CM

## 2019-06-20 DIAGNOSIS — I87.321 IDIOPATHIC CHRONIC VENOUS HYPERTENSION OF RIGHT LOWER EXTREMITY WITH INFLAMMATION: Primary | ICD-10-CM

## 2019-06-20 DIAGNOSIS — L97.919 ULCERS OF BOTH LOWER LEGS (HCC): Chronic | ICD-10-CM

## 2019-06-20 PROCEDURE — 11045 DBRDMT SUBQ TISS EACH ADDL: CPT

## 2019-06-20 PROCEDURE — 99213 OFFICE O/P EST LOW 20 MIN: CPT

## 2019-06-20 PROCEDURE — 11042 DBRDMT SUBQ TIS 1ST 20SQCM/<: CPT

## 2019-06-21 DIAGNOSIS — E11.21 CONTROLLED TYPE 2 DIABETES MELLITUS WITH DIABETIC NEPHROPATHY, WITHOUT LONG-TERM CURRENT USE OF INSULIN (HCC): Primary | ICD-10-CM

## 2019-06-21 RX ORDER — ATORVASTATIN CALCIUM 40 MG/1
40 TABLET, FILM COATED ORAL DAILY
Qty: 90 TABLET | Refills: 1 | Status: SHIPPED | OUTPATIENT
Start: 2019-06-21 | End: 2019-12-13 | Stop reason: SDUPTHER

## 2019-06-24 ENCOUNTER — APPOINTMENT (OUTPATIENT)
Dept: WOUND CARE | Facility: HOSPITAL | Age: 59
End: 2019-06-24
Payer: COMMERCIAL

## 2019-06-24 PROCEDURE — 29580 STRAPPING UNNA BOOT: CPT

## 2019-06-27 ENCOUNTER — APPOINTMENT (OUTPATIENT)
Dept: WOUND CARE | Facility: HOSPITAL | Age: 59
End: 2019-06-27
Payer: COMMERCIAL

## 2019-06-27 PROCEDURE — 11042 DBRDMT SUBQ TIS 1ST 20SQCM/<: CPT

## 2019-06-27 PROCEDURE — 11045 DBRDMT SUBQ TISS EACH ADDL: CPT

## 2019-07-03 ENCOUNTER — APPOINTMENT (OUTPATIENT)
Dept: WOUND CARE | Facility: HOSPITAL | Age: 59
End: 2019-07-03
Payer: COMMERCIAL

## 2019-07-03 PROCEDURE — 29580 STRAPPING UNNA BOOT: CPT

## 2019-07-09 ENCOUNTER — TELEPHONE (OUTPATIENT)
Dept: INTERNAL MEDICINE CLINIC | Facility: CLINIC | Age: 59
End: 2019-07-09

## 2019-07-09 NOTE — TELEPHONE ENCOUNTER
Attempted made to contact patient to remind him that he have an appt with Ildefonso Olguin on 7/11/2019 at 1:40 pm and there's blood work to be completed prior his appt  Patient didn't answer detailed message left on vm and to call me back with any question

## 2019-07-10 NOTE — TELEPHONE ENCOUNTER
Attempted made to contact patient twice and didn't answer  Detailed message left and to call us back with any question

## 2019-07-11 ENCOUNTER — APPOINTMENT (OUTPATIENT)
Dept: WOUND CARE | Facility: HOSPITAL | Age: 59
End: 2019-07-11
Payer: COMMERCIAL

## 2019-07-11 PROCEDURE — 99212 OFFICE O/P EST SF 10 MIN: CPT

## 2019-07-19 ENCOUNTER — OFFICE VISIT (OUTPATIENT)
Dept: INTERNAL MEDICINE CLINIC | Facility: CLINIC | Age: 59
End: 2019-07-19

## 2019-07-19 VITALS
SYSTOLIC BLOOD PRESSURE: 120 MMHG | BODY MASS INDEX: 47.46 KG/M2 | HEART RATE: 84 BPM | TEMPERATURE: 98.3 F | HEIGHT: 65 IN | DIASTOLIC BLOOD PRESSURE: 76 MMHG | WEIGHT: 284.83 LBS

## 2019-07-19 DIAGNOSIS — E11.9 TYPE 2 DIABETES MELLITUS WITHOUT COMPLICATION, WITHOUT LONG-TERM CURRENT USE OF INSULIN (HCC): ICD-10-CM

## 2019-07-19 DIAGNOSIS — E78.00 HYPERCHOLESTEROLEMIA: ICD-10-CM

## 2019-07-19 DIAGNOSIS — R60.0 BILATERAL LOWER EXTREMITY EDEMA: Primary | ICD-10-CM

## 2019-07-19 DIAGNOSIS — Z12.11 SCREENING FOR COLON CANCER: ICD-10-CM

## 2019-07-19 PROBLEM — L97.929 ULCERS OF BOTH LOWER LEGS (HCC): Chronic | Status: RESOLVED | Noted: 2019-05-10 | Resolved: 2019-07-19

## 2019-07-19 PROBLEM — L97.919 ULCERS OF BOTH LOWER LEGS (HCC): Chronic | Status: RESOLVED | Noted: 2019-05-10 | Resolved: 2019-07-19

## 2019-07-19 PROCEDURE — 99213 OFFICE O/P EST LOW 20 MIN: CPT | Performed by: PHYSICIAN ASSISTANT

## 2019-07-19 NOTE — PATIENT INSTRUCTIONS
As we reviewed today you have completed all treatments from wound management and are very happy with the improvement  We provided you with a script to get medical compression stockings  You are aware this has to go to a [employer] store  We reprinted your script and you will get your labs completed  You will call your cardiologist to find out when you neck is need to follow up with them at [facility]  We also reviewed that as you are now not having as many appointments you may call and schedule with weight management  Script provided for you to call and schedule your colonoscopy  Diabetic eye exam is due in August and this order was placed today  You will be contacted with the day and time of your appointment  Appointment with me after your labs for review

## 2019-07-19 NOTE — PROGRESS NOTES
Assessment/Plan:    As we reviewed today you have completed all treatments from wound management and are very happy with the improvement  We provided you with a script to get medical compression stockings  You are aware this has to go to a [employer] store  We reprinted your script and you will get your labs completed  You will call your cardiologist to find out when you neck is need to follow up with them at [facility]  We also reviewed that as you are now not having as many appointments you may call and schedule with weight management  Script provided for you to call and schedule your colonoscopy  Diabetic eye exam is due in August and this order was placed today  You will be contacted with the day and time of your appointment  Appointment with me after your labs for review  No problem-specific Assessment & Plan notes found for this encounter  Diagnoses and all orders for this visit:    Bilateral lower extremity edema  -     Jobst Stockings    Type 2 diabetes mellitus without complication, without long-term current use of insulin ([facility] [address] )  -     Ambulatory referral to Ophthalmology; Future    Screening for colon cancer  -     Ambulatory referral to Gastroenterology; Future    Hypercholesterolemia          Subjective:      Patient ID: Josue Poe  is a 63 y o  male  Pt states completed wound care for his legs, significant improvement and has been discharged  Pt still has significant edema but no longer weeping and no open wounds  Patient also reports significant improvement in the pain in his legs after the wounds have healed and some decreased to the swelling  Now agreeable to script for Job[employer] compression socks  Forgot to get his labs completed will reprint today for his diabetes and high cholesterol    States was busy with other appointments   Reviewed that he is due for his diabetic eye exam in August and this order was placed today and he will be contacted with the appointment  Continues with 1 torsemide daily but unsure when next cardio appt at , will call to schedule  The following portions of the patient's history were reviewed and updated as appropriate: allergies, current medications, past family history, past medical history, past social history, past surgical history and problem list     Review of Systems   Constitutional: Negative  Negative for chills and fever  Respiratory: Negative  Negative for cough, chest tightness and shortness of breath  Cardiovascular: Positive for leg swelling  Negative for chest pain and palpitations  Gastrointestinal: Negative for abdominal pain  Skin: Positive for color change  Negative for wound  Neurological: Negative for weakness and numbness  Objective:      /76   Pulse 84   Temp 98 3 °F (36 8 °C)   Ht 5' 5" (1 651 m)   Wt 129 kg (284 lb 13 4 oz)   BMI 47 40 kg/m²          Physical Exam   Constitutional: He appears well-developed and well-nourished  Cardiovascular: Normal rate, regular rhythm and normal heart sounds  No murmur heard  Pulmonary/Chest: Effort normal and breath sounds normal    Abdominal: Soft  Bowel sounds are normal    Musculoskeletal: He exhibits edema  Skin: Skin is warm and dry  Patient still has hyperpigmentation bilateral lower extremities however no longer has open wounds  No weeping  Swelling originally started superior to bilateral knees now is mid calf and distally  Psychiatric: He has a normal mood and affect  His behavior is normal    Nursing note and vitals reviewed

## 2019-07-26 ENCOUNTER — APPOINTMENT (OUTPATIENT)
Dept: LAB | Facility: CLINIC | Age: 59
End: 2019-07-26
Payer: COMMERCIAL

## 2019-07-26 DIAGNOSIS — E11.9 TYPE 2 DIABETES MELLITUS WITHOUT COMPLICATION, WITHOUT LONG-TERM CURRENT USE OF INSULIN (HCC): ICD-10-CM

## 2019-07-26 LAB
ALBUMIN SERPL BCP-MCNC: 3.6 G/DL (ref 3.5–5)
ALP SERPL-CCNC: 102 U/L (ref 46–116)
ALT SERPL W P-5'-P-CCNC: 38 U/L (ref 12–78)
ANION GAP SERPL CALCULATED.3IONS-SCNC: 5 MMOL/L (ref 4–13)
AST SERPL W P-5'-P-CCNC: 18 U/L (ref 5–45)
BILIRUB SERPL-MCNC: 0.27 MG/DL (ref 0.2–1)
BUN SERPL-MCNC: 14 MG/DL (ref 5–25)
CALCIUM SERPL-MCNC: 8.7 MG/DL (ref 8.3–10.1)
CHLORIDE SERPL-SCNC: 101 MMOL/L (ref 100–108)
CHOLEST SERPL-MCNC: 106 MG/DL (ref 50–200)
CO2 SERPL-SCNC: 29 MMOL/L (ref 21–32)
CREAT SERPL-MCNC: 0.79 MG/DL (ref 0.6–1.3)
EST. AVERAGE GLUCOSE BLD GHB EST-MCNC: 183 MG/DL
GFR SERPL CREATININE-BSD FRML MDRD: 99 ML/MIN/1.73SQ M
GLUCOSE P FAST SERPL-MCNC: 167 MG/DL (ref 65–99)
HBA1C MFR BLD: 8 % (ref 4.2–6.3)
HDLC SERPL-MCNC: 38 MG/DL (ref 40–60)
LDLC SERPL CALC-MCNC: 38 MG/DL (ref 0–100)
POTASSIUM SERPL-SCNC: 3.8 MMOL/L (ref 3.5–5.3)
PROT SERPL-MCNC: 8 G/DL (ref 6.4–8.2)
SODIUM SERPL-SCNC: 135 MMOL/L (ref 136–145)
TRIGL SERPL-MCNC: 151 MG/DL

## 2019-07-26 PROCEDURE — 80061 LIPID PANEL: CPT

## 2019-07-26 PROCEDURE — 80053 COMPREHEN METABOLIC PANEL: CPT

## 2019-07-26 PROCEDURE — 83036 HEMOGLOBIN GLYCOSYLATED A1C: CPT

## 2019-07-26 PROCEDURE — 36415 COLL VENOUS BLD VENIPUNCTURE: CPT

## 2019-07-29 DIAGNOSIS — E11.9 TYPE 2 DIABETES MELLITUS WITHOUT COMPLICATION, WITHOUT LONG-TERM CURRENT USE OF INSULIN (HCC): Primary | ICD-10-CM

## 2019-08-07 DIAGNOSIS — E11.9 TYPE 2 DIABETES MELLITUS WITHOUT COMPLICATION, WITHOUT LONG-TERM CURRENT USE OF INSULIN (HCC): Primary | ICD-10-CM

## 2019-08-14 ENCOUNTER — HOSPITAL ENCOUNTER (OUTPATIENT)
Dept: NEUROLOGY | Facility: CLINIC | Age: 59
Discharge: HOME | End: 2019-08-14
Payer: COMMERCIAL

## 2019-08-14 DIAGNOSIS — G89.29 CHRONIC BILATERAL LOW BACK PAIN, WITH SCIATICA PRESENCE UNSPECIFIED: ICD-10-CM

## 2019-08-14 DIAGNOSIS — I73.9 CLAUDICATION (HCC): ICD-10-CM

## 2019-08-14 DIAGNOSIS — M54.5 CHRONIC BILATERAL LOW BACK PAIN, WITH SCIATICA PRESENCE UNSPECIFIED: ICD-10-CM

## 2019-08-14 DIAGNOSIS — E11.59 TYPE 2 DIABETES MELLITUS WITH OTHER CIRCULATORY COMPLICATION, WITHOUT LONG-TERM CURRENT USE OF INSULIN (HCC): ICD-10-CM

## 2019-08-14 PROCEDURE — 95907 NVR CNDJ TST 1-2 STUDIES: CPT | Performed by: PSYCHIATRY & NEUROLOGY

## 2019-08-16 ENCOUNTER — TELEPHONE (OUTPATIENT)
Dept: FAMILY MEDICINE CLINIC | Facility: CLINIC | Age: 59
End: 2019-08-16

## 2019-08-16 NOTE — TELEPHONE ENCOUNTER
Spoke with patient today about DM classes and/or 1:1 appointment with me   He states he hurt his leg, and needs to go to the wound care center, prefers to wait until September to meet with me  Will call him back in September to schedule    DDS

## 2019-08-19 DIAGNOSIS — L03.119 CELLULITIS OF LOWER EXTREMITY, UNSPECIFIED LATERALITY: Primary | Chronic | ICD-10-CM

## 2019-08-20 ENCOUNTER — TELEPHONE (OUTPATIENT)
Dept: INTERNAL MEDICINE CLINIC | Facility: CLINIC | Age: 59
End: 2019-08-20

## 2019-08-29 ENCOUNTER — APPOINTMENT (OUTPATIENT)
Dept: WOUND CARE | Facility: HOSPITAL | Age: 59
End: 2019-08-29
Payer: COMMERCIAL

## 2019-08-29 PROCEDURE — 11042 DBRDMT SUBQ TIS 1ST 20SQCM/<: CPT

## 2019-08-29 PROCEDURE — 11045 DBRDMT SUBQ TISS EACH ADDL: CPT

## 2019-08-29 PROCEDURE — 99213 OFFICE O/P EST LOW 20 MIN: CPT

## 2019-08-30 ENCOUNTER — TELEPHONE (OUTPATIENT)
Dept: MULTI SPECIALTY CLINIC | Facility: CLINIC | Age: 59
End: 2019-08-30

## 2019-08-30 NOTE — TELEPHONE ENCOUNTER
I'VE ISSUED INSURANCE REFERRAL AND FAXED TO SPECIALIST    I WILL BE FOLLOWING UP TO VERIFY IF RECORDS WERE RECEIVED FOR THIS VISIT

## 2019-08-30 NOTE — TELEPHONE ENCOUNTER
Patient called stating that he went to his appt at the wound center and he was advised to speak with pcp regarding his water pill  They stated that he should have more of this medication  Pt stated that he believes they are right since he cant walk half a block without being out of breath  Pt wants advise from pcp on wether or not he should take more water pills, I did make pt aware that joaquín was not in but we do have a covering provider  Pt asked if someone can get back to him today since it is Friday and we will not be in on Monday  Please advise

## 2019-08-30 NOTE — TELEPHONE ENCOUNTER
Patient called into clinic in regards to the water pill and I informed patient that after provider reviewed his chart she believes it is best that he schedule with cardiology due to the fact that he has not seen them since March 2019 and they should evaluate and determine whether or not it would be appropriate for patient to increase or change water pill  Patient was ok and agreeable with that due to the fact that he knows he has not seen them in a while  As per patient he only called us because the wound center noticed the swelling of his legs and they suggested he contact pcp in regards to water pill  I asked patient whether or not he has been feeling SOB and patient informed me that he only feels SOB with ambulation or when laying flat on his back  DENIES feeling SOB at rest    As per patient he has "some" swelling on his legs not as bad as in the past   DENIES having chest pain or dizziness  I provided patient with Cardiology phone number and patient will be calling and scheduling with them   I also made patient aware that if he begins to feel severe SOB, increased leg swelling or chest pain he must go straight to the ER and patient responded, "I know I know"  Nothing further to do        (FYI when I asked patient if he had a hx of CHF patient responded, " (no no at one point at [facility] that is what they thought was going on but that was cleared up, there's nothing wrong with my heart) "

## 2019-08-30 NOTE — TELEPHONE ENCOUNTER
This is [facility] cardiology info:     Tree Griffin MD    Cardiology    NPI: [id number]    [address] [address] [facility] [address]         Phone: [phone]

## 2019-08-30 NOTE — TELEPHONE ENCOUNTER
I reviewed patient chart and looks like he may have a hx of CHF which is when fluid builds up around the heart  He also has chronic problems with fluid buildup in his legs  However, we have not treated that condition here, he sees cardiology  We also have not prescribed him his torsemide - that comes from cardiology ([facility])  I am concerned as it appears he has not seen them since march 2019  If pt is having worse swelling in his legs, worse SOB than usual at rest or with exertion he needs to call his cardiologist ASAP TODAY for direction  I would not advise any increase or change in medication until he is seen by his cardiologist to make sure he is not having an exacerbation  If patient sxs are rapidly progressively worsening, and he has gained weight, SOB is worse, chest pain, coughing, worse swelling in legs, I would even advise ER evaluation

## 2019-09-03 ENCOUNTER — APPOINTMENT (OUTPATIENT)
Dept: WOUND CARE | Facility: HOSPITAL | Age: 59
End: 2019-09-03
Payer: COMMERCIAL

## 2019-09-03 PROCEDURE — 29580 STRAPPING UNNA BOOT: CPT

## 2019-09-05 ENCOUNTER — TELEPHONE (OUTPATIENT)
Dept: INTERNAL MEDICINE CLINIC | Facility: CLINIC | Age: 59
End: 2019-09-05

## 2019-09-05 ENCOUNTER — APPOINTMENT (OUTPATIENT)
Dept: WOUND CARE | Facility: HOSPITAL | Age: 59
End: 2019-09-05
Payer: COMMERCIAL

## 2019-09-05 PROCEDURE — 11042 DBRDMT SUBQ TIS 1ST 20SQCM/<: CPT

## 2019-09-05 PROCEDURE — 97598 DBRDMT OPN WND ADDL 20CM/<: CPT

## 2019-09-05 PROCEDURE — 97597 DBRDMT OPN WND 1ST 20 CM/<: CPT

## 2019-09-05 NOTE — TELEPHONE ENCOUNTER
I SPOKE TO PT  HE STATED HE WENT TO HIS WOUND CARE APPT  THIS MORNING AND THEY SAID HIS BP /100  HE SAID HE HAS HAD INCREASED SOB AND DIZZINEESS OVER THE LAST 3 DAYS ALTHOUGH HE IS FEELING OK SO FAR TODAY  I ASKED ABOUT HIS BILATERAL EDEMA AND HE SAID WOUND CARE TOLD HIM IT WAS GETTING BETTER AND HE AGREES  I GAVE HIM A SAME DAY APPT  BUT I DID ADVISE HIM THAT I WOULD SPEAK TO SANDRA IN THE MEANTIME AND SHE WHAT SHE RECOMMENDS  PT  ASKED THAT I LEAVE A MESSAGE ON HIS MACHINE WITH HER DECISION    I SPOKE WITH SANDRA AND SHE PREFERS PT  GO TO THE ER FOR EVAL  DUE TO OTHER UNDERLYING CONDITIONS ALONG WITH THESE SYMPTOMS  I CALLED AND LMOM FOR PT  TO GO TO THE ER AND THAT I WAS CANCELLING HIS APPT   HERE TODAY

## 2019-09-09 ENCOUNTER — TELEPHONE (OUTPATIENT)
Dept: INTERNAL MEDICINE CLINIC | Facility: CLINIC | Age: 59
End: 2019-09-09

## 2019-09-09 ENCOUNTER — TELEPHONE (OUTPATIENT)
Dept: SLEEP CENTER | Facility: CLINIC | Age: 59
End: 2019-09-09

## 2019-09-09 NOTE — TELEPHONE ENCOUNTER
Patient left message stating that he needed CPC[facility] supplies  Last office visit was 11/8/2017  Left message for patient advising him he needs to schedule a follow-up visit to obtain a new script for resupplies

## 2019-09-10 NOTE — TELEPHONE ENCOUNTER
PT  CALLED BACK AND SAID HE NEEDS A MASK, HOSE AND ELBOW PIECE THAT CONNECTS THEM  HE DOESN'T KNOW THE EXACT NAME OF THE PIECES  I CALLED AND SPOKE TO ONI AT [address] [place] [address] CPAP SUPPLY DEPT  HE LOOKED UP THE PTS  ACCOUNT AND SAID THEY NEED TO SEND US A WRITTEN ORDER AND WE HAVE TO SIGN OFF ON IT THEN AFTER WE FAX IT BACK THEY WILL SHIP THE SUPPLIES TO THE PT  WILL AWAIT FAX FROM [facility]   NOTHING TO DO AT THIS TIME UNTIL FORM COMES IN    PT  IS AWARE AND HE SAID THIS IS WHAT HE HAD TO DO THE LAST TIME AS WELL

## 2019-09-11 NOTE — TELEPHONE ENCOUNTER
I CALLED [facility] PER PTS  REQUEST TO SEE IF HE CAN GET SUPPLIES FROM THEM AND NURSE STATED SHE LEFT PT  A MESSAGE YESTERDAY ADVISING HE NEEDS TO SCHEDULE AN APPT  LAST O/V WAS 2017 SO NO SCRIPTS CAN BE GIVEN  I CALLED AND SPOKE TO JONH AND MADE HIM AWARE OF THIS  I ADVISED IF I RECEIVE PAPERWORK FROM [facility] I CAN SUBMIT IT BUT HIS INSURANCE MAY REQUIRE HIM TO HAVE UPDATED APPT  WITH SLEEP MEDICINE AND ALSO UPDATED SLEEP STUDY   HE WILL CONTACT THEM TO SCHEDULE APPT

## 2019-09-12 ENCOUNTER — APPOINTMENT (OUTPATIENT)
Dept: WOUND CARE | Facility: HOSPITAL | Age: 59
End: 2019-09-12
Payer: COMMERCIAL

## 2019-09-12 PROCEDURE — 99212 OFFICE O/P EST SF 10 MIN: CPT

## 2019-09-15 DIAGNOSIS — E11.9 TYPE 2 DIABETES MELLITUS WITHOUT COMPLICATION, WITHOUT LONG-TERM CURRENT USE OF INSULIN (HCC): ICD-10-CM

## 2019-09-15 DIAGNOSIS — G47.61 PERIODIC LIMB MOVEMENT SLEEP DISORDER: ICD-10-CM

## 2019-09-15 RX ORDER — GABAPENTIN 300 MG/1
300 CAPSULE ORAL 3 TIMES DAILY
Qty: 90 CAPSULE | Refills: 2 | Status: SHIPPED | OUTPATIENT
Start: 2019-09-15 | End: 2019-12-11 | Stop reason: SDUPTHER

## 2019-09-16 ENCOUNTER — TELEPHONE (OUTPATIENT)
Dept: FAMILY MEDICINE CLINIC | Facility: CLINIC | Age: 59
End: 2019-09-16

## 2019-09-16 NOTE — TELEPHONE ENCOUNTER
ANAHI w/ patient to call me back to schedule either a 1:1 appointment or for Diabetes classes  Left my contact # on message    DDS

## 2019-09-26 ENCOUNTER — TELEPHONE (OUTPATIENT)
Dept: INTERNAL MEDICINE CLINIC | Facility: CLINIC | Age: 59
End: 2019-09-26

## 2019-09-26 NOTE — TELEPHONE ENCOUNTER
Patient called in asking about his script for resupply  Looks like Dr Simmons called in but we need a written script not a verbal  Patient was informed he would have to make a follow up with his doctor before the doctor can write a script  Patient said he would call and make one

## 2019-09-30 DIAGNOSIS — G47.33 OBSTRUCTIVE SLEEP APNEA: Primary | ICD-10-CM

## 2019-10-02 ENCOUNTER — TELEPHONE (OUTPATIENT)
Dept: FAMILY MEDICINE CLINIC | Facility: CLINIC | Age: 59
End: 2019-10-02

## 2019-10-02 NOTE — TELEPHONE ENCOUNTER
LM with patient to call me to schedule an appointment for Diabetes Education  Left call back #    DDS

## 2019-10-06 DIAGNOSIS — E11.9 TYPE 2 DIABETES MELLITUS WITHOUT COMPLICATION, WITHOUT LONG-TERM CURRENT USE OF INSULIN (HCC): ICD-10-CM

## 2019-10-07 NOTE — TELEPHONE ENCOUNTER
Please contact patient's pharmacy and advised them that the 500 mg dose was discontinued    Thank you

## 2019-10-11 ENCOUNTER — TELEPHONE (OUTPATIENT)
Dept: INTERNAL MEDICINE CLINIC | Facility: CLINIC | Age: 59
End: 2019-10-11

## 2019-10-11 DIAGNOSIS — I10 BENIGN ESSENTIAL HYPERTENSION: Primary | ICD-10-CM

## 2019-10-11 PROCEDURE — 4010F ACE/ARB THERAPY RXD/TAKEN: CPT | Performed by: INTERNAL MEDICINE

## 2019-10-11 RX ORDER — LISINOPRIL 40 MG/1
40 TABLET ORAL DAILY
Qty: 90 TABLET | Refills: 1 | Status: SHIPPED | OUTPATIENT
Start: 2019-10-11 | End: 2020-04-01

## 2019-10-11 RX ORDER — LISINOPRIL 40 MG/1
40 TABLET ORAL DAILY
COMMUNITY
Start: 2019-09-07 | End: 2019-10-11 | Stop reason: SDUPTHER

## 2019-10-11 NOTE — TELEPHONE ENCOUNTER
Patient called in requesting refills on LISINOPRIL 40 MG TAKE 1 TABLET DAILY  I do not see this in his med list   Patient stated he has the bottle at home of 40 mg  Please clarify  If patient should be taking 40 mg, please send in electronically

## 2019-10-14 ENCOUNTER — TRANSCRIBE ORDERS (OUTPATIENT)
Dept: LAB | Facility: HOSPITAL | Age: 59
End: 2019-10-14

## 2019-10-14 ENCOUNTER — APPOINTMENT (OUTPATIENT)
Dept: LAB | Facility: HOSPITAL | Age: 59
End: 2019-10-14
Payer: COMMERCIAL

## 2019-10-14 DIAGNOSIS — G47.33 OBSTRUCTIVE SLEEP APNEA (ADULT) (PEDIATRIC): ICD-10-CM

## 2019-10-14 DIAGNOSIS — E78.2 MIXED HYPERLIPIDEMIA: ICD-10-CM

## 2019-10-14 DIAGNOSIS — I10 ESSENTIAL HYPERTENSION, MALIGNANT: ICD-10-CM

## 2019-10-14 DIAGNOSIS — E66.01 CLASS 3 SEVERE OBESITY DUE TO EXCESS CALORIES WITHOUT SERIOUS COMORBIDITY IN ADULT, UNSPECIFIED BMI (HCC): ICD-10-CM

## 2019-10-14 DIAGNOSIS — E11.9 TYPE 2 DIABETES MELLITUS WITHOUT COMPLICATION, WITHOUT LONG-TERM CURRENT USE OF INSULIN (HCC): ICD-10-CM

## 2019-10-14 DIAGNOSIS — R06.02 SHORTNESS OF BREATH: ICD-10-CM

## 2019-10-14 DIAGNOSIS — R06.02 SHORTNESS OF BREATH: Primary | ICD-10-CM

## 2019-10-14 LAB
ALBUMIN SERPL BCP-MCNC: 3.5 G/DL (ref 3.5–5)
ALP SERPL-CCNC: 97 U/L (ref 46–116)
ALT SERPL W P-5'-P-CCNC: 54 U/L (ref 12–78)
ANION GAP SERPL CALCULATED.3IONS-SCNC: 8 MMOL/L (ref 4–13)
AST SERPL W P-5'-P-CCNC: 34 U/L (ref 5–45)
BILIRUB SERPL-MCNC: 0.23 MG/DL (ref 0.2–1)
BUN SERPL-MCNC: 15 MG/DL (ref 5–25)
CALCIUM SERPL-MCNC: 9.2 MG/DL (ref 8.3–10.1)
CHLORIDE SERPL-SCNC: 105 MMOL/L (ref 100–108)
CO2 SERPL-SCNC: 26 MMOL/L (ref 21–32)
CREAT SERPL-MCNC: 0.75 MG/DL (ref 0.6–1.3)
EST. AVERAGE GLUCOSE BLD GHB EST-MCNC: 194 MG/DL
GFR SERPL CREATININE-BSD FRML MDRD: 101 ML/MIN/1.73SQ M
GLUCOSE P FAST SERPL-MCNC: 160 MG/DL (ref 65–99)
HBA1C MFR BLD: 8.4 % (ref 4.2–6.3)
POTASSIUM SERPL-SCNC: 3.9 MMOL/L (ref 3.5–5.3)
PROT SERPL-MCNC: 7.5 G/DL (ref 6.4–8.2)
SODIUM SERPL-SCNC: 139 MMOL/L (ref 136–145)

## 2019-10-14 PROCEDURE — 80053 COMPREHEN METABOLIC PANEL: CPT

## 2019-10-14 PROCEDURE — 36415 COLL VENOUS BLD VENIPUNCTURE: CPT

## 2019-10-14 PROCEDURE — 83036 HEMOGLOBIN GLYCOSYLATED A1C: CPT

## 2019-10-29 ENCOUNTER — TELEPHONE (OUTPATIENT)
Dept: FAMILY MEDICINE CLINIC | Facility: CLINIC | Age: 59
End: 2019-10-29

## 2019-10-31 ENCOUNTER — OFFICE VISIT (OUTPATIENT)
Dept: INTERNAL MEDICINE CLINIC | Facility: CLINIC | Age: 59
End: 2019-10-31

## 2019-10-31 VITALS
TEMPERATURE: 97.7 F | DIASTOLIC BLOOD PRESSURE: 80 MMHG | SYSTOLIC BLOOD PRESSURE: 122 MMHG | HEART RATE: 88 BPM | BODY MASS INDEX: 48.63 KG/M2 | WEIGHT: 291.89 LBS | HEIGHT: 65 IN

## 2019-10-31 DIAGNOSIS — H93.13 TINNITUS OF BOTH EARS: Primary | ICD-10-CM

## 2019-10-31 PROBLEM — H93.19 TINNITUS: Status: ACTIVE | Noted: 2019-10-31

## 2019-10-31 PROCEDURE — 99214 OFFICE O/P EST MOD 30 MIN: CPT | Performed by: HOSPITALIST

## 2019-10-31 RX ORDER — CETIRIZINE HYDROCHLORIDE 10 MG/1
10 TABLET, CHEWABLE ORAL DAILY
Qty: 5 TABLET | Refills: 0 | Status: SHIPPED | OUTPATIENT
Start: 2019-10-31 | End: 2021-09-23 | Stop reason: ALTCHOICE

## 2019-10-31 NOTE — PROGRESS NOTES
ASSESSMENT/PLAN:  Diagnoses and all orders for this visit:    Tinnitus of both ears  Cerumen removed in office bilateral ears  Ears do not appear overtly infected at this time  Will try Zyrtec for 5 days  Advised patient to stop torsemide for 2 weeks to see if tinnitus improves  If tinnitus reoccurs after restarting torsemide consider change diuretic          Will refer patient to ENT  -     cetirizine (ZyrTEC) 10 MG chewable tablet; Chew 1 tablet (10 mg total) daily  -follow-up in 2 weeks        Health Maintenance:  Advised diet and exercise  Advised to refrain from tobacco, alcohol, illicit drug use  Advised medical compliance      Schedule a follow-up appointment in 2 weeks  CHIEF COMPLAINT:   Tinnitus    HISTORY OF PRESENT ILLNESS:  Patient is a 61-year-old male a past medical history significant for obstructive sleep apnea, type 2 diabetes non-insulin dependent, bilateral lower extremity edema presents for evaluation ringing in his ears for approximately 1 month  Patient states that about a month and a half ago he was treated for an ear infection  States approximately 3 days ago developed severe ringing in his ears bilaterally with minimal pain  States that approximately 6 months ago he started torsemide currently taking 40 mg b i d  States today that the ringing in his ears has improved today compared to yesterday  No pain at this time in his ears  Denies any sinus pain, sore throat, discharge from ears          Denies any vertigo, lightheadedness dizziness, nausea, vomiting, fevers, diarrhea, constipation or urinary changes  The following portions of the patient's history were reviewed and updated as appropriate: allergies, current medications, past family history, past medical history, past social history, past surgical history and problem list     Review of Systems   Constitutional: Negative  HENT: Positive for ear pain and tinnitus  Respiratory: Negative      Cardiovascular: Negative  Gastrointestinal: Negative  Genitourinary: Negative  Musculoskeletal: Negative  Neurological: Negative  Psychiatric/Behavioral: Negative  OBJECTIVE:  Vitals:    10/31/19 1127   BP: 122/80   BP Location: Left arm   Patient Position: Sitting   Cuff Size: Adult   Pulse: 88   Temp: 97 7 °F (36 5 °C)   TempSrc: Oral   Weight: 132 kg (291 lb 14 2 oz)   Height: 5' 5" (1 651 m)     Physical Exam   Constitutional: He is oriented to person, place, and time  He appears well-developed and well-nourished  Morbidly obese   HENT:   Head: Normocephalic and atraumatic  Right Ear: Hearing and tympanic membrane normal    Left Ear: Hearing and tympanic membrane normal    Large amounts of cerumen in ears bilaterally   Neck: Normal range of motion  Neck supple  Cardiovascular: Normal rate, regular rhythm, normal heart sounds and intact distal pulses  Pulmonary/Chest: Effort normal and breath sounds normal    Abdominal: Soft  Bowel sounds are normal    Musculoskeletal: Normal range of motion  Neurological: He is alert and oriented to person, place, and time  No cranial nerve deficit  Skin: Skin is warm and dry           Current Outpatient Medications:     ammonium lactate (AMLACTIN) 12 % lotion, Apply topically 2 (two) times a day, Disp: 400 g, Rfl: 5    ammonium lactate (LAC-HYDRIN) 12 % cream, Apply topically, Disp: , Rfl:     atorvastatin (LIPITOR) 40 mg tablet, Take 1 tablet (40 mg total) by mouth daily for 180 days, Disp: 90 tablet, Rfl: 1    carvedilol (COREG) 6 25 mg tablet, Take 6 25 mg by mouth, Disp: , Rfl:     Cholecalciferol (VITAMIN D3) 2000 units capsule, Take 1 capsule by mouth daily, Disp: , Rfl:     gabapentin (NEURONTIN) 300 mg capsule, TAKE 1 CAPSULE (300 MG TOTAL) BY MOUTH 3 (THREE) TIMES A DAY FOR 90 DAYS, Disp: 90 capsule, Rfl: 2    glucose blood (FREESTYLE LITE) test strip, TEST ONCE DAILY, Disp: 100 each, Rfl: 1    lisinopril (ZESTRIL) 40 mg tablet, Take 1 tablet (40 mg total) by mouth daily, Disp: 90 tablet, Rfl: 1    loratadine (CLARITIN) 10 mg tablet, Take 10 mg by mouth daily, Disp: , Rfl:     lovastatin (MEVACOR) 10 MG tablet, Take 10 mg by mouth daily at bedtime, Disp: , Rfl: 0    metFORMIN (GLUCOPHAGE) 1000 MG tablet, Take 1 tablet (1,000 mg total) by mouth 2 (two) times a day with meals for 180 days, Disp: 180 tablet, Rfl: 1    potassium chloride (K-DUR,KLOR-CON) 20 mEq tablet, Take 40 mEq by mouth, Disp: , Rfl:     pramipexole (MIRAPEX) 0 125 mg tablet, TAKE 1 TABLET (0 125 MG TOTAL) BY MOUTH DAILY, Disp: 90 tablet, Rfl: 1    cetirizine (ZyrTEC) 10 MG chewable tablet, Chew 1 tablet (10 mg total) daily, Disp: 5 tablet, Rfl: 0    Past Medical History:   Diagnosis Date    [facility] )     History of gastroesophageal reflux (GERD)     Hypertension     Last assessed: 10/24/13    Sleep apnea      Past Surgical History:   Procedure Laterality Date    AMPUTATION      of Finger, with Neurectomy and flaps     Social History     Socioeconomic History    Marital status: Single     Spouse name: Not on file    Number of children: Not on file    Years of education: Not on file    Highest education level: Not on file   Occupational History    Occupation: works odd jobs/ painting     Comment: part-time work   Social Needs    Financial resource strain: Not on file    Food insecurity:     Worry: Not on file     Inability: Not on file    Transportation needs:     Medical: Not on file     Non-medical: Not on file   Tobacco Use    Smoking status: Former Smoker     Last attempt to quit:      Years since quittin 8    Smokeless tobacco: Never Used   Substance and Sexual Activity    Alcohol use: No    Drug use: No    Sexual activity: Yes   Lifestyle    Physical activity:     Days per week: Not on file     Minutes per session: Not on file    Stress: Not on file   Relationships    Social connections:     Talks on phone: Not on file     Gets together: Not on file     Attends Holiness service: Not on file     Active member of club or organization: Not on file     Attends meetings of clubs or organizations: Not on file     Relationship status: Not on file    Intimate partner violence:     Fear of current or ex partner: Not on file     Emotionally abused: Not on file     Physically abused: Not on file     Forced sexual activity: Not on file   Other Topics Concern    Not on file   Social History Narrative    Sedentary lifestyle     Family History   Problem Relation Age of Onset    Uterine cancer Mother     Diabetes Father     Vascular Disease Father         peripheral    Bone cancer Sister     Other Brother         severe head trauma       ==  DO Juliane Lorenzo 74 Internal Medicine PGY-2    [facility]  [address]  [facility] [address] [address] [facility] [address] [facility]  Office: [phone]  Fax: [phone]     Portions of the record may have been created with voice recognition software  Occasional wrong word or "sound a like" substitutions may have occurred due to the inherent limitations of voice recognition software  Read the chart carefully and recognize, using context, where substitutions have occurred

## 2019-11-01 ENCOUNTER — TELEPHONE (OUTPATIENT)
Dept: INTERNAL MEDICINE CLINIC | Facility: CLINIC | Age: 59
End: 2019-11-01

## 2019-11-01 NOTE — TELEPHONE ENCOUNTER
Patient calling to inform that earache started again today at 3:30PM  Also, wanted to let provider know that he is not taking the"oral pills"

## 2019-11-05 NOTE — TELEPHONE ENCOUNTER
PT  CALLED BACK AND STATED HE WAS TOLD TO STOP THE WATER PILL BECAUSE DR Louie Hodge THOUGHT THAT MAY BE WHAT IS CAUSING THE RINGING IN HIS EARS   HE STILL HAS THE RINGING AND HE SAID HE HAS GAINED ALMOST 3 POUNDS IN THE LAST 5 DAYS

## 2019-11-05 NOTE — TELEPHONE ENCOUNTER
Patient called back and stated he took his last zyrtec yesterday, which has not helped his ears  Patient also stated he was told to stop his water pill for two weeks, which he stopped taking the day after his last appt  Patient stated his legs are retaining water since he stopped the water pill and he has some SOB and bloating since stopping the pill as well  Can patient resume taking the water pill? Please advise  Patient was transferred to clerical and scheduled an ENT appt  Patient stated is he does not answer his phone he wants us to leave a detailed message

## 2019-11-06 NOTE — TELEPHONE ENCOUNTER
Called and spoke to patient  Informed per note  Patient stated he took one torsemide last night and one again this morning  Per verbal discussion with Analilia Villalobos, patient should still take an extra dose today(now) and then no more for the day  Tomorrow he should resume the prescribed dose  Patient was also advised to contact his cardiologist  Patient verbalized understanding

## 2019-11-07 ENCOUNTER — TELEPHONE (OUTPATIENT)
Dept: FAMILY MEDICINE CLINIC | Facility: CLINIC | Age: 59
End: 2019-11-07

## 2019-11-07 ENCOUNTER — TELEPHONE (OUTPATIENT)
Dept: INTERNAL MEDICINE CLINIC | Facility: CLINIC | Age: 59
End: 2019-11-07

## 2019-11-07 DIAGNOSIS — E11.9 TYPE 2 DIABETES MELLITUS WITHOUT COMPLICATION, WITHOUT LONG-TERM CURRENT USE OF INSULIN (HCC): ICD-10-CM

## 2019-11-07 NOTE — TELEPHONE ENCOUNTER
Patient would like to know why refill request for metformin was denied  Stated he went to the pharmacy and was informed the refil was denied  Patient stated he has not taken his metformin today and needs it    Stated he doesn't understand why it is denied since he is a diabetic

## 2019-11-08 DIAGNOSIS — E11.9 TYPE 2 DIABETES MELLITUS WITHOUT COMPLICATION, WITHOUT LONG-TERM CURRENT USE OF INSULIN (HCC): ICD-10-CM

## 2019-11-08 NOTE — TELEPHONE ENCOUNTER
I spoke with pharmacist and they stated that there was an alert that came through their system from "provider" stating that patient is no longer taking medication so they removed the refills that were available    I made them aware that patient should still be taking medication, Can you please resend?      (pharmacist believes it might have been a glitch in the system)

## 2019-11-08 NOTE — TELEPHONE ENCOUNTER
I resent metformin for him  Looks like roberto sent in 80 with 1 refill back in July 2019   Not sure who would have d/c'd med when it is still active in our system

## 2019-11-10 DIAGNOSIS — E11.9 TYPE 2 DIABETES MELLITUS WITHOUT COMPLICATION, WITHOUT LONG-TERM CURRENT USE OF INSULIN (HCC): ICD-10-CM

## 2019-11-12 ENCOUNTER — TELEPHONE (OUTPATIENT)
Dept: INTERNAL MEDICINE CLINIC | Facility: CLINIC | Age: 59
End: 2019-11-12

## 2019-11-12 NOTE — TELEPHONE ENCOUNTER
Aubree   at [facility] ext [phone] wants to know patient to know if patient recently had AIC   And if yes what was his last Hemoglobin AIC and if patient is due for another coming up    Please check into

## 2019-11-14 RX ORDER — TORSEMIDE 20 MG/1
20 TABLET ORAL DAILY
Refills: 1 | COMMUNITY
Start: 2019-11-06

## 2019-11-15 ENCOUNTER — OFFICE VISIT (OUTPATIENT)
Dept: INTERNAL MEDICINE CLINIC | Facility: CLINIC | Age: 59
End: 2019-11-15

## 2019-11-15 VITALS
OXYGEN SATURATION: 95 % | HEIGHT: 65 IN | WEIGHT: 298.94 LBS | TEMPERATURE: 97.4 F | SYSTOLIC BLOOD PRESSURE: 110 MMHG | DIASTOLIC BLOOD PRESSURE: 70 MMHG | HEART RATE: 68 BPM | BODY MASS INDEX: 49.81 KG/M2

## 2019-11-15 DIAGNOSIS — H93.13 TINNITUS OF BOTH EARS: ICD-10-CM

## 2019-11-15 DIAGNOSIS — I10 BENIGN ESSENTIAL HYPERTENSION: ICD-10-CM

## 2019-11-15 DIAGNOSIS — E11.9 TYPE 2 DIABETES MELLITUS WITHOUT COMPLICATION, WITHOUT LONG-TERM CURRENT USE OF INSULIN (HCC): Primary | ICD-10-CM

## 2019-11-15 DIAGNOSIS — M79.89 LEG SWELLING: ICD-10-CM

## 2019-11-15 DIAGNOSIS — E66.01 CLASS 3 SEVERE OBESITY DUE TO EXCESS CALORIES WITH SERIOUS COMORBIDITY AND BODY MASS INDEX (BMI) OF 45.0 TO 49.9 IN ADULT (HCC): Chronic | ICD-10-CM

## 2019-11-15 PROBLEM — L03.90 CELLULITIS: Chronic | Status: RESOLVED | Noted: 2018-12-03 | Resolved: 2019-11-15

## 2019-11-15 PROCEDURE — 3074F SYST BP LT 130 MM HG: CPT | Performed by: PHYSICIAN ASSISTANT

## 2019-11-15 PROCEDURE — 99213 OFFICE O/P EST LOW 20 MIN: CPT | Performed by: PHYSICIAN ASSISTANT

## 2019-11-15 PROCEDURE — 3078F DIAST BP <80 MM HG: CPT | Performed by: PHYSICIAN ASSISTANT

## 2019-11-15 NOTE — PATIENT INSTRUCTIONS
Please resume your healthy diet as noted your sugar has significantly increased  You want to change diet before adding meds, get labs as dated and if no improvement will need additional medications  We will call with appt for wound management as you have not been able to get through      CALL and schedule with the nutritionist as with weight loss will have better sugars AND reduce the swelling in your legs    Sched the hearing test  Appt with ENT is Feb 21  Follow up with cardiologist as schedule at [address]  sched with podiatry  Appt here after new labs as dated in 3 months

## 2019-11-15 NOTE — PROGRESS NOTES
Assessment/Plan:  Please resume your healthy diet as noted your sugar has significantly increased  You want to change diet before adding meds, get labs as dated and if no improvement will need additional medications  We will call with appt for wound management as you have not been able to get through  CALL and schedule with the nutritionist as with weight loss will have better sugars AND reduce the swelling in your legs    Sched the hearing test  Appt with ENT is Feb 21  Follow up with cardiologist as schedule at [address] [facility] [facility] [address]  sched with podiatry  Appt here after new labs as dated in 3   No problem-specific Assessment & Plan notes found for this encounter  Diagnoses and all orders for this visit:    Type 2 diabetes mellitus without complication, without long-term current use of insulin ([facility])  -     Comprehensive metabolic panel; Future  -     Hemoglobin A1C; Future  -     Ambulatory referral to Podiatry; Future    Benign essential hypertension    Class 3 severe obesity due to excess calories with serious comorbidity and body mass index (BMI) of 45 0 to 49 9 in adult [facility])    Tinnitus of both ears  -     Audiometry with tympanometry; Future    Leg swelling    Other orders  -     torsemide (DEMADEX) 20 mg tablet; TAKE 2 TABLETS IN THE AM AND 1 TABLET IN THE AFTERNOON  Subjective:      Patient ID: Miranda Herman  is a 63 y o  male  Pt here for follow up    Saw other provider regarding ringing in ears and started R went to both but now not daily and much less, not loss of hearing  Is back on his torsemide and swelling in legs going down  Followed by [facility] cardio called them and waiting for a call back  Patient reports since resuming the water pill the ringing in his ears has not changed  Patient was on the torsemide for 8 months or more before the ringing    States legs are weeping again and wants to go back to the wound center    Was told he still had 3 additional authorized visits through his insurance  Decline DM foot exam today as has compression on and too difficult to put on/off  We complete at follow up  Wants referral to podiatry to get nails cut    Discussed DM and A1C sig increase to 8 4 % admits has been eating sugar  Does not want additional medications at this time but aware is we discussed that we will get new labs in 3 months and if no improvement will be discussing additional medications  Patient states the dietitian called him regarding his weight management and he told them he wanted to wait until after wound center  I reviewed with patient he needs to call them back and get the 1st available appointment as he has been advised by myself and the cardiologist that the swelling in his legs is secondary to his obesity he does not have heart failure and the only way to prevent the ongoing swelling and wounds is to get meaningful weight loss  The following portions of the patient's history were reviewed and updated as appropriate: allergies, current medications, past family history, past medical history, past social history, past surgical history and problem list     Review of Systems   Constitutional: Negative  Respiratory: Negative  Negative for cough and shortness of breath  Cardiovascular: Positive for leg swelling  Negative for chest pain and palpitations  Gastrointestinal: Negative  Negative for abdominal pain, constipation and diarrhea  Endocrine: Negative for cold intolerance  Genitourinary: Negative for frequency and urgency  Musculoskeletal: Positive for back pain  Skin: Positive for wound  Negative for rash  Neurological: Positive for numbness  Negative for weakness  Psychiatric/Behavioral: Negative            Objective:      /70 (BP Location: Left arm, Patient Position: Sitting, Cuff Size: Large)   Pulse 68   Temp (!) 97 4 °F (36 3 °C) (Oral)   Ht 5' 5" (1 651 m)   Wt 136 kg (298 lb 15 1 oz)   SpO2 95%   BMI 49 75 kg/m² Physical Exam   Constitutional: He appears well-developed and well-nourished  HENT:   Head: Normocephalic  Right Ear: External ear normal    Left Ear: External ear normal    No cerumin   Cardiovascular: Normal rate, regular rhythm and normal heart sounds  Pulmonary/Chest: Effort normal and breath sounds normal  He has no wheezes  Abdominal: Bowel sounds are normal    Musculoskeletal: He exhibits edema (3+)  Neurological: He is alert  Skin: Skin is warm and dry  Weeping L LE ulcer R LE no erythema   Psychiatric: He has a normal mood and affect   His behavior is normal

## 2019-11-15 NOTE — Clinical Note
Please call wound management, patient states has been calling but no answer or call back to schedule follow up for swelling and weeping wounds on his legs  Patient asks for call back once appointment scheduled   Thanks

## 2019-11-18 ENCOUNTER — TELEPHONE (OUTPATIENT)
Dept: INTERNAL MEDICINE CLINIC | Facility: CLINIC | Age: 59
End: 2019-11-18

## 2019-11-18 NOTE — TELEPHONE ENCOUNTER
----- Message from Janice Hwang PA-C sent at 11/15/2019  4:59 PM EST -----  Please call wound management, patient states has been calling but no answer or call back to schedule follow up for swelling and weeping wounds on his legs  Patient asks for call back once appointment scheduled   Thanks

## 2019-11-19 NOTE — TELEPHONE ENCOUNTER
I schedule patient with wound care for 12/03/19 at 2pm  I left voicemail message for patient [phone]  I will also mail order with date and time to address on file

## 2019-11-22 ENCOUNTER — TELEPHONE (OUTPATIENT)
Dept: INTERNAL MEDICINE CLINIC | Facility: CLINIC | Age: 59
End: 2019-11-22

## 2019-11-22 NOTE — TELEPHONE ENCOUNTER
Please review [place] [employer] [place] Assessment Form placed in your folder   CALL PATIENT) when form is complete  If the form requires a signature by a MD or DO, please review it with them and sign the form  Please place the form in the [facility] team folder in the Clinical flow station at the [address] [facility] and reply to this task to the Clinical Pool  3    Pt is aware that you are out of the office today and Monday and that we have a 10 day forms policy  He was not happy with that information and asked that you address this as soon as possible

## 2019-11-23 DIAGNOSIS — G47.61 PERIODIC LIMB MOVEMENTS OF SLEEP: ICD-10-CM

## 2019-11-23 RX ORDER — PRAMIPEXOLE DIHYDROCHLORIDE 0.12 MG/1
0.12 TABLET ORAL DAILY
Qty: 30 TABLET | Refills: 5 | Status: SHIPPED | OUTPATIENT
Start: 2019-11-23 | End: 2020-05-22

## 2019-11-26 ENCOUNTER — OFFICE VISIT (OUTPATIENT)
Dept: MULTI SPECIALTY CLINIC | Facility: CLINIC | Age: 59
End: 2019-11-26

## 2019-11-26 VITALS
DIASTOLIC BLOOD PRESSURE: 78 MMHG | TEMPERATURE: 97.5 F | SYSTOLIC BLOOD PRESSURE: 120 MMHG | BODY MASS INDEX: 49.59 KG/M2 | HEART RATE: 97 BPM | WEIGHT: 297.62 LBS | HEIGHT: 65 IN | OXYGEN SATURATION: 93 %

## 2019-11-26 DIAGNOSIS — E11.9 TYPE 2 DIABETES MELLITUS WITHOUT COMPLICATION, WITHOUT LONG-TERM CURRENT USE OF INSULIN (HCC): ICD-10-CM

## 2019-11-26 DIAGNOSIS — L97.921 CHRONIC ULCER OF LEFT LEG, LIMITED TO BREAKDOWN OF SKIN (HCC): Chronic | ICD-10-CM

## 2019-11-26 DIAGNOSIS — L97.911 ULCER OF RIGHT LEG, LIMITED TO BREAKDOWN OF SKIN (HCC): Primary | ICD-10-CM

## 2019-11-26 PROCEDURE — 11721 DEBRIDE NAIL 6 OR MORE: CPT | Performed by: PODIATRIST

## 2019-11-26 PROCEDURE — 99213 OFFICE O/P EST LOW 20 MIN: CPT | Performed by: PODIATRIST

## 2019-11-26 PROCEDURE — 29581 APPL MULTLAYER CMPRN SYS LEG: CPT | Performed by: PODIATRIST

## 2019-11-26 NOTE — PROGRESS NOTES
At [facility]  63 y o  male MRN: [id number]  Encounter: [id number]      Assessment/Plan        Diagnoses and all orders for this visit:    Ulcer of right leg, limited to breakdown of skin ([facility] )    Type 2 diabetes mellitus without complication, without long-term current use of insulin ([facility] )  -     Ambulatory referral to Podiatry    Chronic ulcer of left leg, limited to breakdown of skin [facility])           Plan:   Patient was seen/examined  All questions and concerns addressed   Nails x10 were sharply trimmed to normal length and thickness with a large nail nipper without incident  (CPT: 15789)   Unna boot applied bilaterally, explained to take the wrap off if any fluid is noted through the dressing and go to the hospital if any signs of infection are noted   Stressed the importance of glycemic control, shoe gear, and proper diabetic foot care   RTC in 1 week(s)    Dr Carlyn Mahoney was present during this entire procedure  History of Present Illness     HPI:  Ana Paula Gorman  is a 63 y o  male who presents with elongated toenails and bilateral leg wounds  States that their nails are painful, elongated  They have difficulty applying their socks and shoes  The pressure within their shoe gear is painful and they have been unable to cut their nails adequately  Patient denies numbness/tingling  They state their last blood glucose level was 110 mg/dL  His last A1c was 8 3%  He has seen Dr Hung Vazquez at the wound care center for bilateral leg ulcerations that have been successfully treated and cured with unnaboots in the past  He has new eruption of leg ulcers bilaterally because he has not been able to wear compression stockings at home  The patient denies any nausea, vomiting, fever, chills, shortness of breath, or chest pains  Review of Systems   Constitutional: Negative  HENT: Negative  Eyes: Negative  Respiratory: Negative  Cardiovascular: Negative      Gastrointestinal: Negative  Musculoskeletal: Negative   Skin: elongated thickened toenails  Superficial ulcers of the legs bilaterally  Neurological: Negative  Historical Information   Past Medical History:   Diagnosis Date    Diabetes mellitus ([address] )     History of gastroesophageal reflux (GERD)     Hypertension     Last assessed: 10/24/13    Sleep apnea      Past Surgical History:   Procedure Laterality Date    AMPUTATION      of Finger, with Neurectomy and flaps     Social History   Social History     Substance and Sexual Activity   Alcohol Use No     Social History     Substance and Sexual Activity   Drug Use No     Social History     Tobacco Use   Smoking Status Former Smoker    Last attempt to quit:     Years since quittin 9   Smokeless Tobacco Never Used     Family History:   Family History   Problem Relation Age of Onset    Uterine cancer Mother     Diabetes Father     Vascular Disease Father         peripheral    Bone cancer Sister     Other Brother         severe head trauma       Meds/Allergies     (Not in a hospital admission)  No Known Allergies    Objective     Current Vitals:   Blood Pressure: 120/78 (19)  Pulse: 97 (19)  Temperature: 97 5 °F (36 4 °C) (19)  Height: 5' 5" (165 1 cm) (19)  Weight - Scale: 135 kg (297 lb 9 9 oz) (19)  SpO2: 93 % (19)        /78   Pulse 97   Temp 97 5 °F (36 4 °C)   Ht 5' 5" (1 651 m)   Wt 135 kg (297 lb 9 9 oz)   SpO2 93%   BMI 49 53 kg/m²       Lower Extremity Exam:    Diabetic Foot Exam    Musculoskeletal:  MMT is 5/5 to all compartments of the LE, +3/4 edema B/L, Hallux limitus is present  Equinus is present  Vascular:   R DP is +2/4, R PT is +1/4, L DP is +2/4, L PT is +1/4, CFT< 3sec to all digits  Pedal hair is Absent  regular rate and rhythm  Skin:  No open Lesions  Skin of the LE is normal texture, temperature, turgor  Interdigital maceration is not present  Right lateral leg ulcer noted approximately 12 cm x 6 cm without fat layer exposed  Moderate serous fluid noted through wound and no surrounding erythema noted of the ulcerations  Left alteral leg noted ulcer has same charateristics slightly smaller no signs of infection  Nails elongated and thickened with subungual debris x10  Neurologic:  Gross sensation is intact  Protective sensation is Intact  Vibratory sensation is Intact  Sharp sensation is n/a

## 2019-11-26 NOTE — PATIENT INSTRUCTIONS
Tendinitis   WHAT YOU NEED TO KNOW:   Tendinitis is painful inflammation or breakdown of your tendons  It may also be called tendinopathy  Tendinitis often occurs in the knee, shoulder, ankle, hip, or elbow  DISCHARGE INSTRUCTIONS:   Medicines:   · Pain medicines  such as acetaminophen or NSAIDs may decrease swelling and pain or fever  These medicines are available without a doctor's order  Ask which medicine to take  Ask how much to take and when to take it  Follow directions  Acetaminophen and NSAIDs can cause liver or kidney damage if not taken correctly  If you take blood thinner medicine, always ask your healthcare provider if NSAIDs are safe for you  Always read the medicine label and follow the directions on it before using these medicine  · Take your medicine as directed  Contact your healthcare provider if you think your medicine is not helping or if you have side effects  Tell him if you are allergic to any medicine  Keep a list of the medicines, vitamins, and herbs you take  Include the amounts, and when and why you take them  Bring the list or the pill bottles to follow-up visits  Carry your medicine list with you in case of an emergency  Management:   · Rest  your tendon as directed to help it heal  Ask your healthcare provider if you need to stop putting weight on your affected area  · Ice  helps decrease swelling and pain  Ice may also help prevent tissue damage  Use an ice pack, or put crushed ice in a plastic bag  Cover it with a towel and place it on the affected area for 10 to 15 minutes every hour or as directed  · Support devices  such as a cane, splint, shoe insert, or brace may help reduce your pain  · Physical therapy  may be ordered by your healthcare provider  This may be used to teach you exercises to help improve movement and strength, and to decrease pain  You may also learn how to improve your posture, and how to lift or exercise correctly    Prevention:   · Stretch and warm up  before you exercise  · Exercise regularly  to strengthen the muscles around your joint  Ease into an exercise routine for the first 3 weeks to prevent another injury  Ask your healthcare provider about the best exercise plan for you  Rest fully between activities  · Use the right equipment  for sports and exercise  Wear braces or tape around weak joints as directed  Follow up with your healthcare provider as directed:  Write down your questions so you remember to ask them during your visits  Contact your healthcare provider if:   · You have increased pain even after you take medicine  · You have questions or concerns about your condition or care  Return to the emergency department if:   · You have increased redness over the joint, or swelling in the joint  · You suddenly cannot move your joint  © 2017 [address]  Information is for End User's use only and may not be sold, redistributed or otherwise used for commercial purposes  All illustrations and images included in CareNotes® are the copyrighted property of A D A M [employer] Inc  or Elver Rogers  The above information is an  only  It is not intended as medical advice for individual conditions or treatments  Talk to your doctor, nurse or pharmacist before following any medical regimen to see if it is safe and effective for you

## 2019-12-02 DIAGNOSIS — E55.9 VITAMIN D DEFICIENCY: Primary | ICD-10-CM

## 2019-12-02 RX ORDER — ACETAMINOPHEN 160 MG
2000 TABLET,DISINTEGRATING ORAL DAILY
Qty: 90 CAPSULE | Refills: 1 | Status: SHIPPED | OUTPATIENT
Start: 2019-12-02 | End: 2020-12-24

## 2019-12-02 NOTE — TELEPHONE ENCOUNTER
Patient believes the insurance will cover the vitamin D now  Can we please send the script to [facility] pharmacy

## 2019-12-03 ENCOUNTER — OFFICE VISIT (OUTPATIENT)
Dept: MULTI SPECIALTY CLINIC | Facility: CLINIC | Age: 59
End: 2019-12-03

## 2019-12-03 ENCOUNTER — TELEPHONE (OUTPATIENT)
Dept: FAMILY MEDICINE CLINIC | Facility: CLINIC | Age: 59
End: 2019-12-03

## 2019-12-03 ENCOUNTER — APPOINTMENT (OUTPATIENT)
Dept: WOUND CARE | Facility: HOSPITAL | Age: 59
End: 2019-12-03
Payer: COMMERCIAL

## 2019-12-03 VITALS
WEIGHT: 299.83 LBS | OXYGEN SATURATION: 98 % | DIASTOLIC BLOOD PRESSURE: 74 MMHG | SYSTOLIC BLOOD PRESSURE: 138 MMHG | BODY MASS INDEX: 49.95 KG/M2 | TEMPERATURE: 97.3 F | HEIGHT: 65 IN | HEART RATE: 87 BPM

## 2019-12-03 DIAGNOSIS — L97.921 CHRONIC ULCER OF LEFT LEG, LIMITED TO BREAKDOWN OF SKIN (HCC): ICD-10-CM

## 2019-12-03 DIAGNOSIS — L97.911 ULCER OF RIGHT LEG, LIMITED TO BREAKDOWN OF SKIN (HCC): ICD-10-CM

## 2019-12-03 DIAGNOSIS — E11.9 TYPE 2 DIABETES MELLITUS WITHOUT COMPLICATION, WITHOUT LONG-TERM CURRENT USE OF INSULIN (HCC): Primary | ICD-10-CM

## 2019-12-03 PROCEDURE — 99213 OFFICE O/P EST LOW 20 MIN: CPT

## 2019-12-03 PROCEDURE — 99213 OFFICE O/P EST LOW 20 MIN: CPT | Performed by: PODIATRIST

## 2019-12-03 NOTE — PROGRESS NOTES
At [facility]  63 y o  male MRN: [id number]  Encounter: [id number]      Assessment/Plan        Diagnoses and all orders for this visit:    Type 2 diabetes mellitus without complication, without long-term current use of insulin ([facility] )    Chronic ulcer of left leg, limited to breakdown of skin (HCC)    Ulcer of right leg, limited to breakdown of skin [facility]           Plan:   Patient was seen/examined  All questions and concerns addressed   Unna boots were removed  Patient is scheduled at [place] [address] wound care center in [place] later today  Washed his legs with soap and water and reapplied temporary dressing until his wound care visit   Stressed the importance of glycemic control, shoe gear, and proper diabetic foot care   Patient was instructed to follow up in wound care clinic for further treatment of his wounds  Dr Ruperto Marie was present during this entire procedure  History of Present Illness     HPI:  Tru Nunn  is a 63 y o  male who presents with b/l venous stasis ulcers  He has seen Dr Ruperto Marie at the wound care center for bilateral leg ulcerations that have been successfully treated and cured with unnaboots in the past  He has new eruption of leg ulcers bilaterally because he has not been able to wear compression stockings at home  The patient denies any nausea, vomiting, fever, chills, shortness of breath, or chest pains  Review of Systems   Constitutional: Negative  HENT: Negative  Eyes: Negative  Respiratory: Negative  Cardiovascular: Negative  Gastrointestinal: Negative  Musculoskeletal: Negative   Skin: Superficial ulcers of the legs bilaterally  Neurological: Negative          Historical Information   Past Medical History:   Diagnosis Date    Diabetes mellitus ([facility] )     History of gastroesophageal reflux (GERD)     Hypertension     Last assessed: 10/24/13    Sleep apnea      Past Surgical History:   Procedure Laterality Date  AMPUTATION      of Finger, with Neurectomy and flaps     Social History   Social History     Substance and Sexual Activity   Alcohol Use No     Social History     Substance and Sexual Activity   Drug Use No     Social History     Tobacco Use   Smoking Status Former Smoker    Last attempt to quit: 2000    Years since quittin 9   Smokeless Tobacco Never Used     Family History:   Family History   Problem Relation Age of Onset    Uterine cancer Mother     Diabetes Father     Vascular Disease Father         peripheral    Bone cancer Sister     Other Brother         severe head trauma       Meds/Allergies     (Not in a hospital admission)  No Known Allergies    Objective     Current Vitals:   Blood Pressure: 138/74 (19)  Pulse: 87 (19)  Temperature: (!) 97 3 °F (36 3 °C) (19)  Temp Source: Oral (19)  Height: 5' 5" (165 1 cm) (19 7929)  Weight - Scale: 136 kg (299 lb 13 2 oz) (19)  SpO2: 98 % (19)        /74 (BP Location: Left arm, Patient Position: Sitting, Cuff Size: Large)   Pulse 87   Temp (!) 97 3 °F (36 3 °C) (Oral)   Ht 5' 5" (1 651 m)   Wt 136 kg (299 lb 13 2 oz)   SpO2 98%   BMI 49 89 kg/m²       Lower Extremity Exam:    Diabetic Foot Exam    Musculoskeletal:  MMT is 5/5 to all compartments of the LE, +3/4 edema B/L, Hallux limitus is present  Equinus is present  Vascular:   R DP is +2/4, R PT is +1/4, L DP is +2/4, L PT is +1/4, CFT< 3sec to all digits  Pedal hair is Absent  regular rate and rhythm  Skin:  Skin of the LE is normal texture, temperature, but abnormal turgor  Interdigital maceration is not present  Right lateral leg ulcer noted approximately 12 cm x 6 cm without fat layer exposed  Moderate serous fluid noted through wound and no surrounding erythema noted of the ulcerations  Left alteral leg noted ulcer has same charateristics slightly smaller no signs of infection         Neurologic:  Gross sensation is intact  Protective sensation is Intact  Vibratory sensation is Intact  Sharp sensation is n/a                                                                                          Foot Exam

## 2019-12-09 ENCOUNTER — TELEPHONE (OUTPATIENT)
Dept: FAMILY MEDICINE CLINIC | Facility: CLINIC | Age: 59
End: 2019-12-09

## 2019-12-10 ENCOUNTER — APPOINTMENT (OUTPATIENT)
Dept: WOUND CARE | Facility: HOSPITAL | Age: 59
End: 2019-12-10
Payer: COMMERCIAL

## 2019-12-10 PROCEDURE — 29581 APPL MULTLAYER CMPRN SYS LEG: CPT

## 2019-12-11 DIAGNOSIS — G47.61 PERIODIC LIMB MOVEMENT SLEEP DISORDER: ICD-10-CM

## 2019-12-11 DIAGNOSIS — E11.9 TYPE 2 DIABETES MELLITUS WITHOUT COMPLICATION, WITHOUT LONG-TERM CURRENT USE OF INSULIN (HCC): ICD-10-CM

## 2019-12-11 RX ORDER — GABAPENTIN 300 MG/1
300 CAPSULE ORAL 3 TIMES DAILY
Qty: 90 CAPSULE | Refills: 2 | Status: SHIPPED | OUTPATIENT
Start: 2019-12-11 | End: 2020-03-03 | Stop reason: SDUPTHER

## 2019-12-13 DIAGNOSIS — E11.21 CONTROLLED TYPE 2 DIABETES MELLITUS WITH DIABETIC NEPHROPATHY, WITHOUT LONG-TERM CURRENT USE OF INSULIN (HCC): ICD-10-CM

## 2019-12-16 RX ORDER — ATORVASTATIN CALCIUM 40 MG/1
TABLET, FILM COATED ORAL
Qty: 90 TABLET | Refills: 1 | Status: SHIPPED | OUTPATIENT
Start: 2019-12-16 | End: 2020-06-08

## 2019-12-17 ENCOUNTER — APPOINTMENT (OUTPATIENT)
Dept: WOUND CARE | Facility: HOSPITAL | Age: 59
End: 2019-12-17
Payer: COMMERCIAL

## 2019-12-17 PROCEDURE — 29580 STRAPPING UNNA BOOT: CPT

## 2019-12-23 ENCOUNTER — APPOINTMENT (OUTPATIENT)
Dept: WOUND CARE | Facility: HOSPITAL | Age: 59
End: 2019-12-23
Payer: COMMERCIAL

## 2019-12-23 PROCEDURE — 99212 OFFICE O/P EST SF 10 MIN: CPT

## 2020-01-07 ENCOUNTER — TELEPHONE (OUTPATIENT)
Dept: FAMILY MEDICINE CLINIC | Facility: CLINIC | Age: 60
End: 2020-01-07

## 2020-01-16 ENCOUNTER — APPOINTMENT (OUTPATIENT)
Dept: WOUND CARE | Facility: HOSPITAL | Age: 60
End: 2020-01-16
Payer: COMMERCIAL

## 2020-01-16 PROCEDURE — 11045 DBRDMT SUBQ TISS EACH ADDL: CPT

## 2020-01-16 PROCEDURE — 11042 DBRDMT SUBQ TIS 1ST 20SQCM/<: CPT

## 2020-01-16 PROCEDURE — 99212 OFFICE O/P EST SF 10 MIN: CPT

## 2020-01-23 ENCOUNTER — APPOINTMENT (OUTPATIENT)
Dept: WOUND CARE | Facility: HOSPITAL | Age: 60
End: 2020-01-23
Payer: COMMERCIAL

## 2020-01-23 PROCEDURE — 11042 DBRDMT SUBQ TIS 1ST 20SQCM/<: CPT

## 2020-01-30 ENCOUNTER — APPOINTMENT (OUTPATIENT)
Dept: WOUND CARE | Facility: HOSPITAL | Age: 60
End: 2020-01-30
Payer: COMMERCIAL

## 2020-01-30 PROCEDURE — 99212 OFFICE O/P EST SF 10 MIN: CPT

## 2020-02-17 ENCOUNTER — TELEPHONE (OUTPATIENT)
Dept: INTERNAL MEDICINE CLINIC | Facility: CLINIC | Age: 60
End: 2020-02-17

## 2020-02-21 ENCOUNTER — OFFICE VISIT (OUTPATIENT)
Dept: MULTI SPECIALTY CLINIC | Facility: CLINIC | Age: 60
End: 2020-02-21

## 2020-02-21 VITALS
BODY MASS INDEX: 50.78 KG/M2 | DIASTOLIC BLOOD PRESSURE: 80 MMHG | WEIGHT: 286.6 LBS | SYSTOLIC BLOOD PRESSURE: 158 MMHG | HEIGHT: 63 IN | TEMPERATURE: 98.4 F

## 2020-02-21 DIAGNOSIS — H93.13 TINNITUS OF BOTH EARS: ICD-10-CM

## 2020-02-21 DIAGNOSIS — H93.11 TINNITUS, RIGHT: Primary | ICD-10-CM

## 2020-02-21 DIAGNOSIS — H61.23 BILATERAL IMPACTED CERUMEN: ICD-10-CM

## 2020-02-21 PROCEDURE — 99243 OFF/OP CNSLTJ NEW/EST LOW 30: CPT | Performed by: PHYSICIAN ASSISTANT

## 2020-02-21 PROCEDURE — 69210 REMOVE IMPACTED EAR WAX UNI: CPT | Performed by: PHYSICIAN ASSISTANT

## 2020-02-21 NOTE — PROGRESS NOTES
[facility] [address] [facility] [facility] [facility] Otolaryngology      Otolaryngology -- New Patient Visit    Ezekiel Vivas  is a 63 y o  who presents with a chief complaint of   Tinnitus right    Pertinent elements of the history include:  Jack Watson is c/o 9 months of tinnitus, right ear  Initially started Both ears x 2 weeks, then [facility] away, but now  Present just in the right ear, but is more on than off  He does have 1-2 days where he has no ringing, but most the time it is present  It is present currently  He denies otalgia, otorrhea, dizziness, headaches  He stop torsemide for 2 weeks per his family doctor without any relief of the ringing, and because of stopping it, it caused his legs to swell and he had to go to wound care  He was taking ibuprofen 3 times a day for the 2 weeks that he was off the torsemide, but has been off for the last month  He denies any hearing change  He denies history of neck injury or whiplash or concussion  He denies family history of hearing loss, chronic otitis media, history of noise exposure  He is also diabetic but sugars are controlled  He is frustrated by this ringing on the right side  He has not had a hearing test at this point  He does have sleep apnea and is on a CPAP machine, and he states that he was off of it for a while because he needed an elbow piece, but since he has restarted it, the intensity of the ringing is less    No Known Allergies  Past Medical History:   Diagnosis Date    Diabetes mellitus ([address] )     History of gastroesophageal reflux (GERD)     Hypertension     Last assessed: 10/24/13    Sleep apnea      Past Surgical History:   Procedure Laterality Date    AMPUTATION      of Finger, with Neurectomy and flaps     Family History   Problem Relation Age of Onset    Uterine cancer Mother     Diabetes Father     Vascular Disease Father         peripheral    Bone cancer Sister     Other Brother         severe head trauma Current Outpatient Medications on File Prior to Visit   Medication Sig Dispense Refill    ammonium lactate (AMLACTIN) 12 % lotion Apply topically 2 (two) times a day 400 g 5    ammonium lactate (LAC-HYDRIN) 12 % cream Apply topically      atorvastatin (LIPITOR) 40 mg tablet TAKE 1 TABLET (40 MG TOTAL) BY MOUTH DAILY 90 tablet 1    carvedilol (COREG) 6 25 mg tablet Take 6 25 mg by mouth      cetirizine (ZyrTEC) 10 MG chewable tablet Chew 1 tablet (10 mg total) daily 5 tablet 0    Cholecalciferol (VITAMIN D3) 50 MCG (2000 UT) capsule Take 1 capsule (2,000 Units total) by mouth daily 90 capsule 1    gabapentin (NEURONTIN) 300 mg capsule TAKE 1 CAPSULE (300 MG TOTAL) BY MOUTH 3 (THREE) TIMES A DAY FOR 90 DAYS 90 capsule 2    glucose blood (FREESTYLE LITE) test strip TEST ONCE DAILY 100 each 3    lisinopril (ZESTRIL) 40 mg tablet Take 1 tablet (40 mg total) by mouth daily 90 tablet 1    loratadine (CLARITIN) 10 mg tablet Take 10 mg by mouth daily      metFORMIN (GLUCOPHAGE) 1000 MG tablet Take 1 tablet (1,000 mg total) by mouth 2 (two) times a day with meals 180 tablet 1    potassium chloride (K-DUR,KLOR-CON) 20 mEq tablet Take 40 mEq by mouth      pramipexole (MIRAPEX) 0 125 mg tablet TAKE 1 TABLET (0 125 MG TOTAL) BY MOUTH DAILY 30 tablet 5    torsemide (DEMADEX) 20 mg tablet TAKE 2 TABLETS IN THE AM AND 1 TABLET IN THE AFTERNOON   1     No current facility-administered medications on file prior to visit  Results reviewed; images from any scan have been personally reviewed:      Physical exam:    /80 (BP Location: Left arm, Patient Position: Sitting, Cuff Size: Large)   Temp 98 4 °F (36 9 °C) (Temporal)   Ht 5' 3 25" (1 607 m)   Wt 130 kg (286 lb 9 6 oz)   BMI 50 37 kg/m²     Constitutional:  Well developed, well nourished and groomed, in no acute distress  Cooperative    Obese    Eyes:  Extra-ocular movements intact, pupils equally round, the lids and conjunctivae are normal in appearance  Gaze-normal left and right  Nystagmus absent left and right  Head: Atraumatic, normocephalic with no lesions or palpable masses  Ears:  Auricles normal in appearance bilaterally, mastoid prominence non-tender  External Auditory Canal - Left - external auditory canal  Moderate nonobstructive wax impaction removed with curette, no drainage observed, no edema noted in EAC, no exostoses present, no osteoma present, no tenderness noted  Right - external auditory canal  Moderate nonobstructive wax impaction removed with curette, no drainage observed, no edema noted in EAC, no exostoses present, no osteoma present, no tenderness noted  Otoscopic Exam - Tympanic Membrane - Left - intact and normal in appearance, no retraction of TM observed, no serous effusion observed, no evidence of tympanosclerosis  Right - intact and normal in appearance, no retraction of TM observed, no serous effusion observed, no evidence of tympanosclerosis  Nose/Sinuses:  External Inspection of the Nose - no deformities observed, no deviation of bone structure, no skin lesion present, no swelling present  Inspection of the nares - Left - nares are symmetric, no deviation of caudal portion of septum  Right - nares are symmetric, no deviation of caudal portion of septum  Nasal Mucosa - Left - no congestion observed, no mucosal lesion or mass present, no ulcerations observed  Right - no congestion observed, no mucosal lesion or mass present, no ulcerations observed  Nasal Septum - Cartilaginous Septum - midline, no bleeding noted, no crusting present, no perforation noted  Turbinates - Inferior - Left - no hypertrophy, no inflammation noted  Right - no hypertrophy, no inflammation noted  Middle - Left - no inflammation noted  Right - no inflammation noted  Oral Cavity:  Lips - Upper Lip - normal color, moist, no cracks or lesions  Lower Lip - normal color, moist, no cracks or lesions   Teeth - no loose teeth, no missing teeth  Gingiva - no bleeding observed, no inflammation present  Hard Palate - no asymmetry observed, no torus present  Soft Palate - normal, no ulcers noted  Oropharynx - no uvular edema is observed, uvula is midline, no edema of posterior pharyngeal walls observed  Tongue - normal mobility, surfaces without fissures,leukoplakia, ulceration or masses, not enlarged, no pallor noted, no white patches present  Oral Mucosa - no masses, lesions, leukoplakia, scarring and normal Michaelle's ducts, pink and moist, no discoloration noted  Tonsils -no hypertrophy, no ulcerations noted  No exudate  Floor of mouth- normal Warthin's ducts, no lesions, ulceration, leukoplakia or torus mandibularis  Salivary Glands - Submandibular Glands - Left - non-tender  Right - non-tender  Parotid Glands - Left - non-tender  Right - non-tender  Sublingual Salivary Glands - non-tender  Neck:  No visible or palpable cervical lesions or lymphadenopathy, thyroid gland is normal in size and symmetry and without masses, normal laryngeal elevation with swallowing  No tenderness  Significant temporal muscle prominence bilaterally, patient admits to bruxism    Cardiovascular:  Not examined  Respiratory:  Normal respiratory effort without evidence of retractions or use of accessory muscles  Integument:  Normal appearing without observed masses or lesions  Neurologic:  Cranial nerves II-XII grossly intact bilaterally  Normal gait  Psychiatric:  Alert and oriented to time, place and person, normal affect, normal speech  Procedures      Assessment:   1  Tinnitus, right  Comprehensive hearing evaluation   2  Tinnitus of both ears  Ambulatory Referral to Otolaryngology   3  Bilateral impacted cerumen         Orders  Orders Placed This Encounter   Procedures    Comprehensive hearing evaluation     Standing Status:   Future     Standing Expiration Date:   2/21/2021         Discussion/Plan:    1    Cerumen impactions root bilaterally, but his eardrum was visible prior to removal   I reassured him that his ear nose and throat exam are overall okay and no reason for the tinnitus  I would like to order an audiogram to further evaluate his hearing, if asymmetric he will need a workup  We did discuss that torsemide has a side effect of ototoxicity, but you would think would be both ears  I also emphasized tight sugar control as that can affect the hearing nerves over time  He is off the ibuprofen for the last month and I encouraged that he remain off of it  We discussed heat and massage to his neck and jaw because 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

## 2020-02-24 ENCOUNTER — TELEPHONE (OUTPATIENT)
Dept: INTERNAL MEDICINE CLINIC | Facility: CLINIC | Age: 60
End: 2020-02-24

## 2020-02-24 NOTE — TELEPHONE ENCOUNTER
Attempted made to contact patient  Patient have an appt tomorrow 2/25/2020 with [facility] at 9:40 am for his 3 months follow up and labs results  Blood test was expected to be completed on 2/15/2020 and still active  Patient didn't answer  Detailed message left on voice mail

## 2020-02-25 ENCOUNTER — APPOINTMENT (OUTPATIENT)
Dept: LAB | Facility: CLINIC | Age: 60
End: 2020-02-25
Payer: COMMERCIAL

## 2020-02-25 DIAGNOSIS — E11.9 TYPE 2 DIABETES MELLITUS WITHOUT COMPLICATION, WITHOUT LONG-TERM CURRENT USE OF INSULIN (HCC): ICD-10-CM

## 2020-02-25 LAB
ALBUMIN SERPL BCP-MCNC: 3.6 G/DL (ref 3.5–5)
ALP SERPL-CCNC: 89 U/L (ref 46–116)
ALT SERPL W P-5'-P-CCNC: 47 U/L (ref 12–78)
ANION GAP SERPL CALCULATED.3IONS-SCNC: 6 MMOL/L (ref 4–13)
AST SERPL W P-5'-P-CCNC: 22 U/L (ref 5–45)
BILIRUB SERPL-MCNC: 0.29 MG/DL (ref 0.2–1)
BUN SERPL-MCNC: 21 MG/DL (ref 5–25)
CALCIUM SERPL-MCNC: 9.4 MG/DL (ref 8.3–10.1)
CHLORIDE SERPL-SCNC: 106 MMOL/L (ref 100–108)
CO2 SERPL-SCNC: 28 MMOL/L (ref 21–32)
CREAT SERPL-MCNC: 0.81 MG/DL (ref 0.6–1.3)
EST. AVERAGE GLUCOSE BLD GHB EST-MCNC: 166 MG/DL
GFR SERPL CREATININE-BSD FRML MDRD: 98 ML/MIN/1.73SQ M
GLUCOSE P FAST SERPL-MCNC: 139 MG/DL (ref 65–99)
HBA1C MFR BLD: 7.4 %
POTASSIUM SERPL-SCNC: 4.1 MMOL/L (ref 3.5–5.3)
PROT SERPL-MCNC: 7.4 G/DL (ref 6.4–8.2)
SODIUM SERPL-SCNC: 140 MMOL/L (ref 136–145)

## 2020-02-25 PROCEDURE — 80053 COMPREHEN METABOLIC PANEL: CPT

## 2020-02-25 PROCEDURE — 83036 HEMOGLOBIN GLYCOSYLATED A1C: CPT

## 2020-02-25 PROCEDURE — 3051F HG A1C>EQUAL 7.0%<8.0%: CPT | Performed by: PHYSICIAN ASSISTANT

## 2020-02-25 PROCEDURE — 36415 COLL VENOUS BLD VENIPUNCTURE: CPT

## 2020-03-03 ENCOUNTER — OFFICE VISIT (OUTPATIENT)
Dept: INTERNAL MEDICINE CLINIC | Facility: CLINIC | Age: 60
End: 2020-03-03

## 2020-03-03 VITALS
HEIGHT: 65 IN | TEMPERATURE: 97.7 F | WEIGHT: 286.16 LBS | SYSTOLIC BLOOD PRESSURE: 116 MMHG | HEART RATE: 78 BPM | DIASTOLIC BLOOD PRESSURE: 74 MMHG | BODY MASS INDEX: 47.68 KG/M2 | OXYGEN SATURATION: 95 %

## 2020-03-03 DIAGNOSIS — G47.33 OBSTRUCTIVE SLEEP APNEA: ICD-10-CM

## 2020-03-03 DIAGNOSIS — E66.01 CLASS 3 SEVERE OBESITY DUE TO EXCESS CALORIES WITH SERIOUS COMORBIDITY AND BODY MASS INDEX (BMI) OF 45.0 TO 49.9 IN ADULT (HCC): Chronic | ICD-10-CM

## 2020-03-03 DIAGNOSIS — E78.00 HYPERCHOLESTEROLEMIA: ICD-10-CM

## 2020-03-03 DIAGNOSIS — E11.9 TYPE 2 DIABETES MELLITUS WITHOUT COMPLICATION, WITHOUT LONG-TERM CURRENT USE OF INSULIN (HCC): Primary | ICD-10-CM

## 2020-03-03 DIAGNOSIS — Z12.11 COLON CANCER SCREENING: ICD-10-CM

## 2020-03-03 DIAGNOSIS — G47.61 PERIODIC LIMB MOVEMENT SLEEP DISORDER: ICD-10-CM

## 2020-03-03 DIAGNOSIS — R60.0 BILATERAL LOWER EXTREMITY EDEMA: ICD-10-CM

## 2020-03-03 DIAGNOSIS — I10 BENIGN ESSENTIAL HYPERTENSION: ICD-10-CM

## 2020-03-03 PROBLEM — L97.921 CHRONIC ULCER OF LEFT LEG, LIMITED TO BREAKDOWN OF SKIN (HCC): Chronic | Status: RESOLVED | Noted: 2019-05-10 | Resolved: 2020-03-03

## 2020-03-03 PROBLEM — L97.911 ULCER OF RIGHT LEG, LIMITED TO BREAKDOWN OF SKIN (HCC): Status: RESOLVED | Noted: 2019-11-26 | Resolved: 2020-03-03

## 2020-03-03 LAB
LEFT EYE DIABETIC RETINOPATHY: NORMAL
LEFT EYE IMAGE QUALITY: NORMAL
LEFT EYE MACULAR EDEMA: NORMAL
LEFT EYE OTHER RETINOPATHY: NORMAL
RIGHT EYE DIABETIC RETINOPATHY: NORMAL
RIGHT EYE IMAGE QUALITY: NORMAL
RIGHT EYE MACULAR EDEMA: NORMAL
RIGHT EYE OTHER RETINOPATHY: NORMAL
SEVERITY (EYE EXAM): NORMAL

## 2020-03-03 PROCEDURE — 1036F TOBACCO NON-USER: CPT | Performed by: PHYSICIAN ASSISTANT

## 2020-03-03 PROCEDURE — 99214 OFFICE O/P EST MOD 30 MIN: CPT | Performed by: PHYSICIAN ASSISTANT

## 2020-03-03 PROCEDURE — 3074F SYST BP LT 130 MM HG: CPT | Performed by: PHYSICIAN ASSISTANT

## 2020-03-03 PROCEDURE — 3008F BODY MASS INDEX DOCD: CPT | Performed by: PHYSICIAN ASSISTANT

## 2020-03-03 PROCEDURE — 2022F DILAT RTA XM EVC RTNOPTHY: CPT | Performed by: PHYSICIAN ASSISTANT

## 2020-03-03 PROCEDURE — 3051F HG A1C>EQUAL 7.0%<8.0%: CPT | Performed by: PHYSICIAN ASSISTANT

## 2020-03-03 PROCEDURE — 2025F 7 FLD RTA PHOTO W/O RTNOPTHY: CPT | Performed by: PHYSICIAN ASSISTANT

## 2020-03-03 PROCEDURE — 3078F DIAST BP <80 MM HG: CPT | Performed by: PHYSICIAN ASSISTANT

## 2020-03-03 RX ORDER — GABAPENTIN 300 MG/1
300 CAPSULE ORAL 3 TIMES DAILY
Qty: 90 CAPSULE | Refills: 2 | Status: SHIPPED | OUTPATIENT
Start: 2020-03-03 | End: 2020-07-06

## 2020-03-03 NOTE — PATIENT INSTRUCTIONS
As per our discussion today your sugar is improving however A1c is still above goal at 7 4%  You confirm you have been making additional dietary changes including avoidance of sweet juices and drinks  You also report you are now scheduled with [facility] weight management program to start the process for healthy meaningful weight loss  You are aware that the assistance in weight loss will help your blood pressure, diabetes, as well as your breathing and swelling in your legs  As per review you will be due to see your cardiologist at [facility] in April, please contact his office to schedule your 6 month follow-up  Please continue your lisinopril, carvedilol and torsemide as well as your atorvastatin for cholesterol daily  Continue CPAP every night as noted you have improvement in breathing and sleep since you have gotten your machine back  You confirm you are scheduled for the hearing test on March 18th, you are aware that the ear nose and throat doctor will contact you with your results and advise on follow-up  Script provided for follow-up labs as dated in 6 months  Referral for GI to schedule your colonoscopy also provided today please call the office to schedule this appointment  As reviewed this referral was provided previously but secondary to complications with leg wounds was not scheduled  Diabetic foot and eye exams completed in the office today and we will contact you with the eye exam results  For your dry feet I would recommend Eucerin every day to your legs and feet, in the summer time you may switch to Aquaphor as this is lighter during the warmer months  As reviewed the wounds in your legs have resolved after treatment with wound management and swelling is significantly improved with wearing your compression socks on a daily basis

## 2020-03-18 ENCOUNTER — OFFICE VISIT (OUTPATIENT)
Dept: AUDIOLOGY | Age: 60
End: 2020-03-18
Payer: COMMERCIAL

## 2020-03-18 DIAGNOSIS — H90.3 SENSORY HEARING LOSS, BILATERAL: Primary | ICD-10-CM

## 2020-03-18 PROCEDURE — 92557 COMPREHENSIVE HEARING TEST: CPT | Performed by: AUDIOLOGIST-HEARING AID FITTER

## 2020-03-18 PROCEDURE — 92567 TYMPANOMETRY: CPT | Performed by: AUDIOLOGIST-HEARING AID FITTER

## 2020-03-18 NOTE — LETTER
2020     Shira Miller PA-C  [address] [facility]    Patient: Fidelia Danielson  YOB: 1960   Date of Visit: 3/18/2020       Dear Dr Isai Dillon: Thank you for referring Monica Boyd to me for evaluation  Below are my notes for this consultation  If you have questions, please do not hesitate to call me  I look forward to following your patient along with you  Sincerely,        Vickey Siddiqui        CC: No Recipients  Vickey Siddiqui  3/18/2020 11:49 AM  Sign at close encounter  HEARING EVALUATION    Name:  Fidelia Danielson  :  1960  Age:  63 y o  Date of Evaluation: 20     History: Tinnitus  Reason for visit: Fidelia Danielson  is being seen today at the request of Dr Isai Dillon for an evaluation of hearing  Patient reports he experiences a ringing in his right ear that began about 8 months ago  Reported that it is interfering with his daily life and sleep  No difficulties with hearing at this time  Reported that he began to take torsemide around the time when the ringing began but states the doctor took him off of it for two weeks with no relief  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  EVALUATION:    Otoscopic Evaluation:   Right Ear: Mild cerumen   Left Ear: Mild cerumen    Tympanometry:   Right: Type A - normal middle ear pressure and compliance   Left: Type A - normal middle ear pressure and compliance    Audiogram Results:  Pure tone testing revealed a mild sloping to moderately severe sensorineural hearing loss bilaterally  SRT and PTA are in agreement indicating good test reliability  Word recognition scores were good in the right ear and excellent in the left ear  *see attached audiogram      RECOMMENDATIONS:  Annual hearing eval, Return to [facility]  for F/U, Hearing Aid Evaluation and Copy to Patient/Caregiver    PATIENT EDUCATION:   Discussed results and recommendations with patient   Recommended a hearing evaluation to discuss the benefit of hearing aids for tinnitus  Patient was not interested and stated he does not need hearing aids and that his hearing is fine  Questions were addressed and the patient was encouraged to contact our department should concerns arise        Laila Matthews   Clinical Audiologist

## 2020-03-18 NOTE — PROGRESS NOTES
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  EVALUATION:    Otoscopic Evaluation:   Right Ear: Mild cerumen   Left Ear: Mild cerumen    Tympanometry:   Right: Type A - normal middle ear pressure and compliance   Left: Type A - normal middle ear pressure and compliance    Audiogram Results:  Pure tone testing revealed a mild sloping to moderately severe sensorineural hearing loss bilaterally  SRT and PTA are in agreement indicating good test reliability  Word recognition scores were good in the right ear and excellent in the left ear  *see attached audiogram      RECOMMENDATIONS:  Annual hearing eval, Return to [facility]  for F/U, Hearing Aid Evaluation and Copy to Patient/Caregiver    PATIENT EDUCATION:   Discussed results and recommendations with patient  Recommended a hearing evaluation to discuss the benefit of hearing aids for tinnitus  Patient was not interested and stated he does not need hearing aids and that his hearing is fine  Questions were addressed and the patient was encouraged to contact our department should concerns arise        Laila Arredondo   Clinical Audiologist

## 2020-04-01 DIAGNOSIS — I10 BENIGN ESSENTIAL HYPERTENSION: ICD-10-CM

## 2020-04-01 PROCEDURE — 4010F ACE/ARB THERAPY RXD/TAKEN: CPT | Performed by: PHYSICIAN ASSISTANT

## 2020-04-01 RX ORDER — LISINOPRIL 40 MG/1
TABLET ORAL
Qty: 90 TABLET | Refills: 1 | Status: SHIPPED | OUTPATIENT
Start: 2020-04-01 | End: 2020-09-25

## 2020-04-30 DIAGNOSIS — E11.9 TYPE 2 DIABETES MELLITUS WITHOUT COMPLICATION, WITHOUT LONG-TERM CURRENT USE OF INSULIN (HCC): ICD-10-CM

## 2020-05-22 DIAGNOSIS — G47.61 PERIODIC LIMB MOVEMENTS OF SLEEP: ICD-10-CM

## 2020-05-22 RX ORDER — PRAMIPEXOLE DIHYDROCHLORIDE 0.12 MG/1
0.12 TABLET ORAL DAILY
Qty: 30 TABLET | Refills: 5 | Status: SHIPPED | OUTPATIENT
Start: 2020-05-22 | End: 2020-11-15

## 2020-06-08 DIAGNOSIS — E11.21 CONTROLLED TYPE 2 DIABETES MELLITUS WITH DIABETIC NEPHROPATHY, WITHOUT LONG-TERM CURRENT USE OF INSULIN (HCC): ICD-10-CM

## 2020-06-08 PROCEDURE — 3066F NEPHROPATHY DOC TX: CPT | Performed by: PHYSICIAN ASSISTANT

## 2020-06-08 RX ORDER — ATORVASTATIN CALCIUM 40 MG/1
TABLET, FILM COATED ORAL
Qty: 90 TABLET | Refills: 1 | Status: SHIPPED | OUTPATIENT
Start: 2020-06-08 | End: 2020-12-22

## 2020-07-03 DIAGNOSIS — G47.61 PERIODIC LIMB MOVEMENT SLEEP DISORDER: ICD-10-CM

## 2020-07-03 DIAGNOSIS — E11.9 TYPE 2 DIABETES MELLITUS WITHOUT COMPLICATION, WITHOUT LONG-TERM CURRENT USE OF INSULIN (HCC): ICD-10-CM

## 2020-07-06 RX ORDER — GABAPENTIN 300 MG/1
CAPSULE ORAL
Qty: 90 CAPSULE | Refills: 2 | Status: SHIPPED | OUTPATIENT
Start: 2020-07-06 | End: 2020-09-28

## 2020-07-06 NOTE — TELEPHONE ENCOUNTER
Name of medication, dose, quantity and frequency    Number of refills left:    Amount of medication left:    Pharmacy verified and updated    Additional information:

## 2020-07-06 NOTE — TELEPHONE ENCOUNTER
Patient called back and said that he has been without the gabapentin for two days and is now feeling dizzy  The patient would like a call back once the medication is sent to the pharmacy thank you

## 2020-08-31 ENCOUNTER — TELEPHONE (OUTPATIENT)
Dept: INTERNAL MEDICINE CLINIC | Facility: CLINIC | Age: 60
End: 2020-08-31

## 2020-09-10 NOTE — TELEPHONE ENCOUNTER
I have been unable to reach this patient by phone  A letter is being sent to the last known home address

## 2020-09-25 DIAGNOSIS — I10 BENIGN ESSENTIAL HYPERTENSION: ICD-10-CM

## 2020-09-25 DIAGNOSIS — E11.9 TYPE 2 DIABETES MELLITUS WITHOUT COMPLICATION, WITHOUT LONG-TERM CURRENT USE OF INSULIN (HCC): ICD-10-CM

## 2020-09-25 PROCEDURE — 4010F ACE/ARB THERAPY RXD/TAKEN: CPT | Performed by: PHYSICIAN ASSISTANT

## 2020-09-25 RX ORDER — LISINOPRIL 40 MG/1
TABLET ORAL
Qty: 90 TABLET | Refills: 1 | Status: SHIPPED | OUTPATIENT
Start: 2020-09-25 | End: 2021-02-26

## 2020-09-26 DIAGNOSIS — G47.61 PERIODIC LIMB MOVEMENT SLEEP DISORDER: ICD-10-CM

## 2020-09-26 DIAGNOSIS — E11.9 TYPE 2 DIABETES MELLITUS WITHOUT COMPLICATION, WITHOUT LONG-TERM CURRENT USE OF INSULIN (HCC): ICD-10-CM

## 2020-09-28 RX ORDER — GABAPENTIN 300 MG/1
CAPSULE ORAL
Qty: 90 CAPSULE | Refills: 2 | Status: SHIPPED | OUTPATIENT
Start: 2020-09-28 | End: 2021-01-04

## 2020-10-01 ENCOUNTER — APPOINTMENT (OUTPATIENT)
Dept: LAB | Facility: HOSPITAL | Age: 60
End: 2020-10-01
Payer: COMMERCIAL

## 2020-10-01 ENCOUNTER — TRANSCRIBE ORDERS (OUTPATIENT)
Dept: LAB | Facility: HOSPITAL | Age: 60
End: 2020-10-01

## 2020-10-01 DIAGNOSIS — E78.00 HYPERCHOLESTEROLEMIA: ICD-10-CM

## 2020-10-01 DIAGNOSIS — E11.9 TYPE 2 DIABETES MELLITUS WITHOUT COMPLICATION, WITHOUT LONG-TERM CURRENT USE OF INSULIN (HCC): ICD-10-CM

## 2020-10-01 LAB
ALBUMIN SERPL BCP-MCNC: 4.1 G/DL (ref 3.5–5)
ALP SERPL-CCNC: 101 U/L (ref 46–116)
ALT SERPL W P-5'-P-CCNC: 51 U/L (ref 12–78)
ANION GAP SERPL CALCULATED.3IONS-SCNC: 3 MMOL/L (ref 4–13)
AST SERPL W P-5'-P-CCNC: 26 U/L (ref 5–45)
BILIRUB SERPL-MCNC: 0.5 MG/DL (ref 0.2–1)
BUN SERPL-MCNC: 17 MG/DL (ref 5–25)
CALCIUM SERPL-MCNC: 9 MG/DL (ref 8.3–10.1)
CHLORIDE SERPL-SCNC: 105 MMOL/L (ref 100–108)
CHOLEST SERPL-MCNC: 121 MG/DL (ref 50–200)
CO2 SERPL-SCNC: 31 MMOL/L (ref 21–32)
CREAT SERPL-MCNC: 0.76 MG/DL (ref 0.6–1.3)
CREAT UR-MCNC: 46.1 MG/DL
EST. AVERAGE GLUCOSE BLD GHB EST-MCNC: 157 MG/DL
GFR SERPL CREATININE-BSD FRML MDRD: 100 ML/MIN/1.73SQ M
GLUCOSE P FAST SERPL-MCNC: 87 MG/DL (ref 65–99)
HBA1C MFR BLD: 7.1 %
HDLC SERPL-MCNC: 37 MG/DL
LDLC SERPL CALC-MCNC: 54 MG/DL (ref 0–100)
MICROALBUMIN UR-MCNC: 5.5 MG/L (ref 0–20)
MICROALBUMIN/CREAT 24H UR: 12 MG/G CREATININE (ref 0–30)
POTASSIUM SERPL-SCNC: 4 MMOL/L (ref 3.5–5.3)
PROT SERPL-MCNC: 8 G/DL (ref 6.4–8.2)
SODIUM SERPL-SCNC: 139 MMOL/L (ref 136–145)
TRIGL SERPL-MCNC: 148 MG/DL

## 2020-10-01 PROCEDURE — 3051F HG A1C>EQUAL 7.0%<8.0%: CPT | Performed by: PHYSICIAN ASSISTANT

## 2020-10-01 PROCEDURE — 3061F NEG MICROALBUMINURIA REV: CPT | Performed by: PHYSICIAN ASSISTANT

## 2020-10-01 PROCEDURE — 80053 COMPREHEN METABOLIC PANEL: CPT

## 2020-10-01 PROCEDURE — 82043 UR ALBUMIN QUANTITATIVE: CPT

## 2020-10-01 PROCEDURE — 82570 ASSAY OF URINE CREATININE: CPT

## 2020-10-01 PROCEDURE — 36415 COLL VENOUS BLD VENIPUNCTURE: CPT

## 2020-10-01 PROCEDURE — 83036 HEMOGLOBIN GLYCOSYLATED A1C: CPT

## 2020-10-01 PROCEDURE — 80061 LIPID PANEL: CPT

## 2020-10-20 ENCOUNTER — OFFICE VISIT (OUTPATIENT)
Dept: INTERNAL MEDICINE CLINIC | Facility: CLINIC | Age: 60
End: 2020-10-20

## 2020-10-20 VITALS
WEIGHT: 284 LBS | DIASTOLIC BLOOD PRESSURE: 68 MMHG | OXYGEN SATURATION: 95 % | HEART RATE: 77 BPM | HEIGHT: 65 IN | TEMPERATURE: 97.5 F | BODY MASS INDEX: 47.32 KG/M2 | SYSTOLIC BLOOD PRESSURE: 124 MMHG

## 2020-10-20 DIAGNOSIS — I10 BENIGN ESSENTIAL HYPERTENSION: ICD-10-CM

## 2020-10-20 DIAGNOSIS — G47.33 OBSTRUCTIVE SLEEP APNEA: ICD-10-CM

## 2020-10-20 DIAGNOSIS — E78.00 HYPERCHOLESTEROLEMIA: ICD-10-CM

## 2020-10-20 DIAGNOSIS — Z28.20 IMMUNIZATION NOT CARRIED OUT BECAUSE OF PATIENT DECISION: ICD-10-CM

## 2020-10-20 DIAGNOSIS — E11.9 TYPE 2 DIABETES MELLITUS WITHOUT COMPLICATION, WITHOUT LONG-TERM CURRENT USE OF INSULIN (HCC): Primary | ICD-10-CM

## 2020-10-20 DIAGNOSIS — H93.13 TINNITUS OF BOTH EARS: ICD-10-CM

## 2020-10-20 DIAGNOSIS — R60.0 LOWER LEG EDEMA: ICD-10-CM

## 2020-10-20 DIAGNOSIS — E66.01 CLASS 3 SEVERE OBESITY DUE TO EXCESS CALORIES WITH SERIOUS COMORBIDITY AND BODY MASS INDEX (BMI) OF 45.0 TO 49.9 IN ADULT (HCC): Chronic | ICD-10-CM

## 2020-10-20 PROCEDURE — 3008F BODY MASS INDEX DOCD: CPT | Performed by: PHYSICIAN ASSISTANT

## 2020-10-20 PROCEDURE — 99214 OFFICE O/P EST MOD 30 MIN: CPT | Performed by: PHYSICIAN ASSISTANT

## 2020-10-20 PROCEDURE — 1036F TOBACCO NON-USER: CPT | Performed by: PHYSICIAN ASSISTANT

## 2020-10-26 ENCOUNTER — LAB (OUTPATIENT)
Dept: LAB | Facility: HOSPITAL | Age: 60
End: 2020-10-26
Payer: COMMERCIAL

## 2020-10-26 ENCOUNTER — TRANSCRIBE ORDERS (OUTPATIENT)
Dept: LAB | Facility: HOSPITAL | Age: 60
End: 2020-10-26

## 2020-10-26 DIAGNOSIS — M79.89 SWELLING OF LIMB: ICD-10-CM

## 2020-10-26 DIAGNOSIS — I10 ESSENTIAL HYPERTENSION, MALIGNANT: ICD-10-CM

## 2020-10-26 DIAGNOSIS — G47.33 OBSTRUCTIVE SLEEP APNEA (ADULT) (PEDIATRIC): ICD-10-CM

## 2020-10-26 DIAGNOSIS — E66.01 MORBID OBESITY (HCC): ICD-10-CM

## 2020-10-26 DIAGNOSIS — R06.02 SHORTNESS OF BREATH: Primary | ICD-10-CM

## 2020-10-26 DIAGNOSIS — R06.02 SHORTNESS OF BREATH: ICD-10-CM

## 2020-10-26 DIAGNOSIS — E78.2 MIXED HYPERLIPIDEMIA: ICD-10-CM

## 2020-10-26 LAB
ALBUMIN SERPL BCP-MCNC: 3.6 G/DL (ref 3.5–5)
ALP SERPL-CCNC: 102 U/L (ref 46–116)
ALT SERPL W P-5'-P-CCNC: 48 U/L (ref 12–78)
ANION GAP SERPL CALCULATED.3IONS-SCNC: 6 MMOL/L (ref 4–13)
AST SERPL W P-5'-P-CCNC: 21 U/L (ref 5–45)
BILIRUB SERPL-MCNC: 0.39 MG/DL (ref 0.2–1)
BUN SERPL-MCNC: 17 MG/DL (ref 5–25)
CALCIUM SERPL-MCNC: 8.9 MG/DL (ref 8.3–10.1)
CHLORIDE SERPL-SCNC: 104 MMOL/L (ref 100–108)
CO2 SERPL-SCNC: 28 MMOL/L (ref 21–32)
CREAT SERPL-MCNC: 0.78 MG/DL (ref 0.6–1.3)
GFR SERPL CREATININE-BSD FRML MDRD: 99 ML/MIN/1.73SQ M
GLUCOSE P FAST SERPL-MCNC: 119 MG/DL (ref 65–99)
POTASSIUM SERPL-SCNC: 4 MMOL/L (ref 3.5–5.3)
PROT SERPL-MCNC: 8 G/DL (ref 6.4–8.2)
SODIUM SERPL-SCNC: 138 MMOL/L (ref 136–145)

## 2020-10-26 PROCEDURE — 80053 COMPREHEN METABOLIC PANEL: CPT

## 2020-10-26 PROCEDURE — 36415 COLL VENOUS BLD VENIPUNCTURE: CPT

## 2020-10-27 ENCOUNTER — TRANSCRIBE ORDERS (OUTPATIENT)
Dept: LAB | Facility: HOSPITAL | Age: 60
End: 2020-10-27

## 2020-10-27 ENCOUNTER — APPOINTMENT (OUTPATIENT)
Dept: LAB | Facility: HOSPITAL | Age: 60
End: 2020-10-27
Payer: COMMERCIAL

## 2020-10-27 DIAGNOSIS — G47.33 OBSTRUCTIVE SLEEP APNEA (ADULT) (PEDIATRIC): ICD-10-CM

## 2020-10-27 DIAGNOSIS — I10 ESSENTIAL HYPERTENSION, MALIGNANT: ICD-10-CM

## 2020-10-27 DIAGNOSIS — E66.01 MORBID OBESITY (HCC): ICD-10-CM

## 2020-10-27 DIAGNOSIS — R06.2 WHEEZING: ICD-10-CM

## 2020-10-27 DIAGNOSIS — M79.89 SWELLING OF LIMB: ICD-10-CM

## 2020-10-27 DIAGNOSIS — E78.2 MIXED HYPERLIPIDEMIA: Primary | ICD-10-CM

## 2020-10-27 DIAGNOSIS — E78.2 MIXED HYPERLIPIDEMIA: ICD-10-CM

## 2020-10-27 LAB
ALBUMIN SERPL BCP-MCNC: 3.9 G/DL (ref 3.5–5)
ALP SERPL-CCNC: 92 U/L (ref 46–116)
ALT SERPL W P-5'-P-CCNC: 47 U/L (ref 12–78)
ANION GAP SERPL CALCULATED.3IONS-SCNC: 6 MMOL/L (ref 4–13)
AST SERPL W P-5'-P-CCNC: 27 U/L (ref 5–45)
BILIRUB SERPL-MCNC: 0.43 MG/DL (ref 0.2–1)
BUN SERPL-MCNC: 21 MG/DL (ref 5–25)
CALCIUM SERPL-MCNC: 9.2 MG/DL (ref 8.3–10.1)
CHLORIDE SERPL-SCNC: 108 MMOL/L (ref 100–108)
CHOLEST SERPL-MCNC: 103 MG/DL (ref 50–200)
CO2 SERPL-SCNC: 26 MMOL/L (ref 21–32)
CREAT SERPL-MCNC: 0.73 MG/DL (ref 0.6–1.3)
GFR SERPL CREATININE-BSD FRML MDRD: 102 ML/MIN/1.73SQ M
GLUCOSE P FAST SERPL-MCNC: 112 MG/DL (ref 65–99)
HDLC SERPL-MCNC: 41 MG/DL
LDLC SERPL CALC-MCNC: 32 MG/DL (ref 0–100)
NONHDLC SERPL-MCNC: 62 MG/DL
POTASSIUM SERPL-SCNC: 3.9 MMOL/L (ref 3.5–5.3)
PROT SERPL-MCNC: 7.8 G/DL (ref 6.4–8.2)
SODIUM SERPL-SCNC: 140 MMOL/L (ref 136–145)
TRIGL SERPL-MCNC: 151 MG/DL

## 2020-10-27 PROCEDURE — 80053 COMPREHEN METABOLIC PANEL: CPT

## 2020-10-27 PROCEDURE — 36415 COLL VENOUS BLD VENIPUNCTURE: CPT

## 2020-10-27 PROCEDURE — 80061 LIPID PANEL: CPT

## 2020-11-15 DIAGNOSIS — G47.61 PERIODIC LIMB MOVEMENTS OF SLEEP: ICD-10-CM

## 2020-11-15 RX ORDER — PRAMIPEXOLE DIHYDROCHLORIDE 0.12 MG/1
TABLET ORAL
Qty: 30 TABLET | Refills: 5 | Status: SHIPPED | OUTPATIENT
Start: 2020-11-15 | End: 2021-05-09

## 2020-12-14 NOTE — TELEPHONE ENCOUNTER
Patient calling to follow up on request  As per patient, he's completely out of meds  Nsaids Counseling: NSAID Counseling: I discussed with the patient that NSAIDs should be taken with food. Prolonged use of NSAIDs can result in the development of stomach ulcers.  Patient advised to stop taking NSAIDs if abdominal pain occurs.  The patient verbalized understanding of the proper use and possible adverse effects of NSAIDs.  All of the patient's questions and concerns were addressed.

## 2020-12-21 RX ORDER — TORSEMIDE 20 MG/1
TABLET ORAL
Refills: 1 | OUTPATIENT
Start: 2020-12-21

## 2020-12-21 NOTE — TELEPHONE ENCOUNTER
Name of medication, dose, quantity and frequency  Requested Prescriptions     Pending Prescriptions Disp Refills    torsemide (DEMADEX) 20 mg tablet   1         Number of refills left: 0    Amount of medication left: 0    Pharmacy verified and updated

## 2020-12-22 DIAGNOSIS — E11.21 CONTROLLED TYPE 2 DIABETES MELLITUS WITH DIABETIC NEPHROPATHY, WITHOUT LONG-TERM CURRENT USE OF INSULIN (HCC): ICD-10-CM

## 2020-12-22 RX ORDER — ATORVASTATIN CALCIUM 40 MG/1
TABLET, FILM COATED ORAL
Qty: 90 TABLET | Refills: 1 | Status: SHIPPED | OUTPATIENT
Start: 2020-12-22 | End: 2021-04-06

## 2020-12-24 DIAGNOSIS — E55.9 VITAMIN D DEFICIENCY: ICD-10-CM

## 2020-12-24 RX ORDER — CALCIUM CARBONATE/VITAMIN D3 600 MG-20
TABLET ORAL
Qty: 30 CAPSULE | Refills: 5 | Status: SHIPPED | OUTPATIENT
Start: 2020-12-24 | End: 2021-06-30

## 2021-01-02 DIAGNOSIS — E11.9 TYPE 2 DIABETES MELLITUS WITHOUT COMPLICATION, WITHOUT LONG-TERM CURRENT USE OF INSULIN (HCC): ICD-10-CM

## 2021-01-02 DIAGNOSIS — G47.61 PERIODIC LIMB MOVEMENT SLEEP DISORDER: ICD-10-CM

## 2021-01-04 ENCOUNTER — TELEPHONE (OUTPATIENT)
Dept: INTERNAL MEDICINE CLINIC | Facility: CLINIC | Age: 61
End: 2021-01-04

## 2021-01-04 ENCOUNTER — TELEMEDICINE (OUTPATIENT)
Dept: INTERNAL MEDICINE CLINIC | Facility: CLINIC | Age: 61
End: 2021-01-04

## 2021-01-04 DIAGNOSIS — R05.8 DRY COUGH: Primary | ICD-10-CM

## 2021-01-04 DIAGNOSIS — R09.89 CHEST CONGESTION: ICD-10-CM

## 2021-01-04 DIAGNOSIS — R52 GENERALIZED BODY ACHES: ICD-10-CM

## 2021-01-04 DIAGNOSIS — R05.8 DRY COUGH: ICD-10-CM

## 2021-01-04 PROCEDURE — 87637 SARSCOV2&INF A&B&RSV AMP PRB: CPT | Performed by: PHYSICIAN ASSISTANT

## 2021-01-04 PROCEDURE — 99213 OFFICE O/P EST LOW 20 MIN: CPT | Performed by: PHYSICIAN ASSISTANT

## 2021-01-04 RX ORDER — GABAPENTIN 300 MG/1
CAPSULE ORAL
Qty: 90 CAPSULE | Refills: 2 | Status: SHIPPED | OUTPATIENT
Start: 2021-01-04 | End: 2021-04-06

## 2021-01-04 NOTE — TELEPHONE ENCOUNTER
Patient had a virtual appointment with Iantha Dandy today  He is calling because he went for the COVID test  But he is wondering why antibiotics were not ordered for him  I did explain to him Raven Jaquez may not have thought antibiotics would benefit him but I would check with her  He's insisting he wants antibiotics so he can feel better  Please advise

## 2021-01-04 NOTE — PROGRESS NOTES
COVID-19 Virtual Visit     Assessment/Plan:    Problem List Items Addressed This Visit     None      Visit Diagnoses     Dry cough    -  Primary    Relevant Orders    Novel Coronavirus 2019(COVID-19), Influenza A/B, RSV [facility] Collected at [facility]    Generalized body aches        Relevant Orders    Novel Coronavirus 2019(COVID-19), Influenza A/B, RSV [facility] Collected at [facility]    Chest congestion        Relevant Orders    Novel Coronavirus 2019(COVID-19), Influenza A/B, RSV [facility] Collected at [facility]         Disposition:     I referred patient to one of our centralized sites for a COVID-19 swab  As per our telephone conversation today your symptoms especially the specific dry cough could be from [place]  We also reviewed that you had not yet received your flu vaccine  While many of her symptoms are improving you do still have dry cough and chest tightness and therefore have been referred for testing  As per our telephone conversation you were already at the testing site and you are aware that once this test is completed you are to self isolate at home  We will get in contact with you as soon as we have your result but advised to contact our office if you have any new or worsening of your symptoms prior to that  Continue to stay well hydrated and get plenty of rest     I have spent 8 minutes directly with the patient  Greater than 50% of this time was spent in counseling/coordination of care regarding: instructions for management, risk factor reductions and impressions  Encounter provider Caesar Kent PA-C    Provider located at [place] [id number] [phone]    Recent Visits  No visits were found meeting these conditions     Showing recent visits within past 7 days and meeting all other requirements     Today's Visits  Date Type Provider Dept 01/04/21 Telemedicine Shanae Moreno, [facility] today's visits and meeting all other requirements     Future Appointments  No visits were found meeting these conditions  Showing future appointments within next 150 days and meeting all other requirements        Patient agrees to participate in a virtual check in via telephone or video visit instead of presenting to the office to address urgent/immediate medical needs  Patient is aware this is a billable service  After connecting through Telephone, the patient was identified by name and date of birth  Ana Paula Gorman  was informed that this was a telemedicine visit and that the exam was being conducted confidentially over secure lines  My office door was closed  No one else was in the room  Ana Paula Gorman  acknowledged consent and understanding of privacy and security of the telemedicine visit  I informed the patient that I have reviewed his record in Epic and presented the opportunity for him to ask any questions regarding the visit today  The patient agreed to participate  It was my intent to perform this visit via video technology but the patient was not able to do a video connection so the visit was completed via audio telephone only  Subjective:   Ana Paula Gorman  is a 62 y o  male who is concerned about COVID-19  Date of symptom onset: 12/28/2020    Patient's symptoms include fever (subjective), chills, nasal congestion, cough, chest tightness, diarrhea and myalgias  Patient denies sore throat, shortness of breath, nausea and vomiting  Started with body aches, cough dry, runny nose on 12/28  Next day diarrhea 3-4 days  No vomiting  No loss of taste or smell  No SOB or difficulty breathing  1/4/2020 today still has mild dry cough and chest congestion all other symptoms resolved  States no nasal congestion  States no fever when checked was 97 6 last week    Did have chills    No known COVID contacts  Did not get his flu vaccine yet  No results found for: Bryan Hung, [place], [address]  Past Medical History:   Diagnosis Date    Diabetes mellitus ([facility] [address] )     History of gastroesophageal reflux (GERD)     Hypertension     Last assessed: 10/24/13    Sleep apnea      Past Surgical History:   Procedure Laterality Date    AMPUTATION      of Finger, with Neurectomy and flaps     Current Outpatient Medications   Medication Sig Dispense Refill    ammonium lactate (AMLACTIN) 12 % lotion Apply topically 2 (two) times a day 400 g 5    atorvastatin (LIPITOR) 40 mg tablet TAKE 1 TABLET BY MOUTH EVERY DAY 90 tablet 1    carvedilol (COREG) 6 25 mg tablet Take 6 25 mg by mouth      cetirizine (ZyrTEC) 10 MG chewable tablet Chew 1 tablet (10 mg total) daily 5 tablet 0    [employer] D3 50 MCG (2000 UT) CAPS TAKE 1 CAPSULE BY MOUTH EVERY DAY 30 capsule 5    gabapentin (NEURONTIN) 300 mg capsule TAKE 1 CAPSULE BY MOUTH THREE TIMES A DAY 90 capsule 2    glucose blood (FREESTYLE LITE) test strip TEST ONCE DAILY 100 each 3    lisinopril (ZESTRIL) 40 mg tablet TAKE 1 TABLET BY MOUTH EVERY DAY 90 tablet 1    loratadine (CLARITIN) 10 mg tablet Take 10 mg by mouth daily      metFORMIN (GLUCOPHAGE) 1000 MG tablet Take 1 tablet (1,000 mg total) by mouth 2 (two) times a day with meals 180 tablet 1    potassium chloride (K-DUR,KLOR-CON) 20 mEq tablet Take 40 mEq by mouth      pramipexole (MIRAPEX) 0 125 mg tablet TAKE 1 TABLET BY MOUTH DAILY 30 tablet 5    torsemide (DEMADEX) 20 mg tablet TAKE 2 TABLETS IN THE AM AND 1 TABLET IN THE AFTERNOON   1     No current facility-administered medications for this visit  No Known Allergies    Review of Systems   Constitutional: Positive for chills and fever (subjective)  HENT: Positive for congestion  Negative for sore throat  Respiratory: Positive for cough and chest tightness  Negative for shortness of breath and wheezing      Gastrointestinal: Positive for diarrhea  Negative for nausea and vomiting  Musculoskeletal: Positive for myalgias  Skin: Negative  Neurological: Negative  Objective: There were no vitals filed for this visit  Physical Exam  Nursing note reviewed  Constitutional:       Comments: No physical exam was completed secondary to patient does not have video capabilities  He was speaking in full sentences without difficulty  No coughing throughout telephone conversation  VIRTUAL VISIT DISCLAIMER    Yuliya Bowens  acknowledges that he has consented to an online visit or consultation  He understands that the online visit is based solely on information provided by him, and that, in the absence of a face-to-face physical evaluation by the physician, the diagnosis he receives is both limited and provisional in terms of accuracy and completeness  This is not intended to replace a full medical face-to-face evaluation by the physician  Yuliya Bowens  understands and accepts these terms

## 2021-01-05 LAB
FLUAV RNA NPH QL NAA+PROBE: NOT DETECTED
FLUBV RNA NPH QL NAA+PROBE: NOT DETECTED
RSV RNA NPH QL NAA+PROBE: NOT DETECTED
SARS-COV-2 RNA NPH QL NAA+PROBE: DETECTED

## 2021-01-06 ENCOUNTER — TELEPHONE (OUTPATIENT)
Dept: INTERNAL MEDICINE CLINIC | Facility: CLINIC | Age: 61
End: 2021-01-06

## 2021-01-06 NOTE — TELEPHONE ENCOUNTER
Patient is calling to speak to Analilia Villalobos today  He wants to speak directly to her  He says its personal and would not tell me anymore information

## 2021-01-07 ENCOUNTER — TELEMEDICINE (OUTPATIENT)
Dept: INTERNAL MEDICINE CLINIC | Facility: CLINIC | Age: 61
End: 2021-01-07

## 2021-01-07 DIAGNOSIS — U07.1 DIARRHEA DUE TO COVID-19: ICD-10-CM

## 2021-01-07 DIAGNOSIS — R05.9 COUGH IN ADULT: ICD-10-CM

## 2021-01-07 DIAGNOSIS — A08.39 DIARRHEA DUE TO COVID-19: ICD-10-CM

## 2021-01-07 DIAGNOSIS — U07.1 COVID-19 VIRUS INFECTION: Primary | ICD-10-CM

## 2021-01-07 PROCEDURE — 99213 OFFICE O/P EST LOW 20 MIN: CPT | Performed by: PHYSICIAN ASSISTANT

## 2021-01-07 NOTE — PROGRESS NOTES
COVID-19 Virtual Visit     Assessment/Plan:    Problem List Items Addressed This Visit        Other    COVID-19 virus infection - Primary      Other Visit Diagnoses     Diarrhea due to COVID-19        Cough in adult             Disposition:     I recommended continued isolation until at least 24 hours have passed since recovery defined as resolution of fever without the use of fever-reducing medications AND improvement in COVID symptoms AND 10 days have passed since onset of symptoms (or 10 days have passed since date of first positive viral diagnostic test for asymptomatic patients)  We completed your 1st telephone follow-up visit after your diagnosis of COVID-19  We did confirm that most of her symptoms have completely resolved and you report you are feeling significantly better  We did review however that you still have a mild cough but that you are still having some diarrhea every day  Per CDC guidelines you are now greater than 10 days from the onset of her symptoms however you are still having diarrhea and therefore you need to remain under quarantine at home  You are eating and drinking well and otherwise as noted feeling much improved  We will get you scheduled for a 2nd follow-up visit early next week to monitor your symptoms and advise when you can come out of quarantine  We did discuss however if you develop any new or worsening of symptoms to contact our office right away  I have spent 11 minutes directly with the patient  Greater than 50% of this time was spent in counseling/coordination of care regarding: diagnostic results, prognosis, risks and benefits of treatment options, instructions for management, risk factor reductions and impressions          Encounter provider Cynthia Escalante PA-C    Provider located at [facility]-[address] [address] [phone]    Recent Visits  Date Type Provider Dept   01/06/21 Telephone QIAN Patterson [facility]   01/04/21 Telephone QIAN Patterson [facility]   01/04/21 Telemedicine Janice Hwang, [address] recent visits within past 7 days and meeting all other requirements     Today's Visits  Date Type Provider Dept   01/07/21 Telemedicine QIAN Patterson [address] today's visits and meeting all other requirements     Future Appointments  No visits were found meeting these conditions  Showing future appointments within next 150 days and meeting all other requirements        Patient agrees to participate in a virtual check in via telephone or video visit instead of presenting to the office to address urgent/immediate medical needs  Patient is aware this is a billable service  After connecting through Telephone, the patient was identified by name and date of birth  Horacio Blanco  was informed that this was a telemedicine visit and that the exam was being conducted confidentially over secure lines  My office door was closed  No one else was in the room  Horacio Blanco  acknowledged consent and understanding of privacy and security of the telemedicine visit  I informed the patient that I have reviewed his record in Epic and presented the opportunity for him to ask any questions regarding the visit today  The patient agreed to participate  It was my intent to perform this visit via video technology but the patient was not able to do a video connection so the visit was completed via audio telephone only  Subjective:   Horacio Blanco  is a 62 y o  male who has been screened for COVID-19  Symptom change since last report: improving  Patient's symptoms include cough, chest tightness and diarrhea  Patient denies fever, chills, anosmia, loss of taste (not lost but decreased), shortness of breath, nausea, vomiting and myalgias  Rianna Ryan has been staying home and has isolated themselves in his home  Taking care not to share personal items?: is not  is cleaning all surfaces that are touched often, like counters, tabletops, and doorknobs using household cleaning sprays or wipes  Wearing a mask when leaving room?: is not      Date of symptom onset: 12/28/2020  Date of positive COVID-19 PCR: 1/4/2021    Patient contacted for first COVID follow up  Does admit to significant improvement since onset of symptoms  Patient reports when symptoms started on the 28th he had significant body aches, cough, diarrhea and was feeling very tired  Patient confirms he never had a fever or shortness of breath  Patient reports all symptoms have resolved except for:  Slight cough now  No SOB  Still has Diarrhea 3-4 X a day is more loose stool no pain and is improving    Eating and drinking well  States never had a fever temp was 97 to 98    Patient confirms lives alone  We reviewed CDC guidelines as well          Lab Results   Component Value Date    SARSCOV2 Detected (A) 01/04/2021     Past Medical History:   Diagnosis Date    Diabetes mellitus ([facility] [address] )     History of gastroesophageal reflux (GERD)     Hypertension     Last assessed: 10/24/13    Sleep apnea      Past Surgical History:   Procedure Laterality Date    AMPUTATION      of Finger, with Neurectomy and flaps     Current Outpatient Medications   Medication Sig Dispense Refill    ammonium lactate (AMLACTIN) 12 % lotion Apply topically 2 (two) times a day 400 g 5    atorvastatin (LIPITOR) 40 mg tablet TAKE 1 TABLET BY MOUTH EVERY DAY 90 tablet 1    carvedilol (COREG) 6 25 mg tablet Take 6 25 mg by mouth      cetirizine (ZyrTEC) 10 MG chewable tablet Chew 1 tablet (10 mg total) daily 5 tablet 0    CVS D3 50 MCG (2000 UT) CAPS TAKE 1 CAPSULE BY MOUTH EVERY DAY 30 capsule 5    gabapentin (NEURONTIN) 300 mg capsule TAKE 1 CAPSULE BY MOUTH THREE TIMES A DAY 90 capsule 2    glucose blood (FREESTYLE LITE) test strip TEST ONCE DAILY 100 each 3    lisinopril (ZESTRIL) 40 mg tablet TAKE 1 TABLET BY MOUTH EVERY DAY 90 tablet 1    loratadine (CLARITIN) 10 mg tablet Take 10 mg by mouth daily      metFORMIN (GLUCOPHAGE) 1000 MG tablet Take 1 tablet (1,000 mg total) by mouth 2 (two) times a day with meals 180 tablet 1    potassium chloride (K-DUR,KLOR-CON) 20 mEq tablet Take 40 mEq by mouth      pramipexole (MIRAPEX) 0 125 mg tablet TAKE 1 TABLET BY MOUTH DAILY 30 tablet 5    torsemide (DEMADEX) 20 mg tablet TAKE 2 TABLETS IN THE AM AND 1 TABLET IN THE AFTERNOON   1     No current facility-administered medications for this visit  No Known Allergies    Review of Systems   Constitutional: Negative for chills and fever  Respiratory: Positive for cough and chest tightness  Negative for shortness of breath  Gastrointestinal: Positive for diarrhea  Negative for nausea and vomiting  Musculoskeletal: Negative for myalgias  Skin: Negative  Neurological: Negative  Objective:    Vitals:       Physical Exam  Nursing note reviewed  Constitutional:       Comments: Not able to complete physical exam as patient does not have a phone with video capabilities  Patient however was able to speak in clear full sentences  Was not coughing throughout telephone conversation  Was not struggling to breathe  Pulmonary:      Effort: Pulmonary effort is normal  No respiratory distress  Neurological:      Mental Status: He is alert  Psychiatric:         Mood and Affect: Mood normal        VIRTUAL VISIT DISCLAIMER    Preethi Mahoney  acknowledges that he has consented to an online visit or consultation  He understands that the online visit is based solely on information provided by him, and that, in the absence of a face-to-face physical evaluation by the physician, the diagnosis he receives is both limited and provisional in terms of accuracy and completeness  This is not intended to replace a full medical face-to-face evaluation by the physician  Preethi Mahoney  understands and accepts these terms

## 2021-01-08 ENCOUNTER — TELEPHONE (OUTPATIENT)
Dept: INTERNAL MEDICINE CLINIC | Facility: CLINIC | Age: 61
End: 2021-01-08

## 2021-01-12 ENCOUNTER — TELEMEDICINE (OUTPATIENT)
Dept: INTERNAL MEDICINE CLINIC | Facility: CLINIC | Age: 61
End: 2021-01-12

## 2021-01-12 DIAGNOSIS — R19.5 LOOSE STOOLS: ICD-10-CM

## 2021-01-12 DIAGNOSIS — R53.83 FEELING TIRED: ICD-10-CM

## 2021-01-12 DIAGNOSIS — U07.1 COVID-19 VIRUS INFECTION: Primary | ICD-10-CM

## 2021-01-12 PROCEDURE — 1036F TOBACCO NON-USER: CPT | Performed by: PHYSICIAN ASSISTANT

## 2021-01-12 PROCEDURE — 99213 OFFICE O/P EST LOW 20 MIN: CPT | Performed by: PHYSICIAN ASSISTANT

## 2021-01-12 NOTE — PROGRESS NOTES
COVID-19 Virtual Visit     Assessment/Plan:    Problem List Items Addressed This Visit        Other    COVID-19 virus infection - Primary      Other Visit Diagnoses     Loose stools        Feeling tired             Disposition:     I recommended continued isolation until at least 24 hours have passed since recovery defined as resolution of fever without the use of fever-reducing medications AND improvement in COVID symptoms AND 10 days have passed since onset of symptoms (or 10 days have passed since date of first positive viral diagnostic test for asymptomatic patients)  As per our discussion for your COVID follow-up visit today we did review that you are continuing to improve however you are still having loose stool  While you do confirm you are no longer having watery diarrhea your stool is still not formed  You do confirm eating well throughout including solid foods  We did review CDC guidelines and that you need to be without diarrhea for 24 hours before we can advise that you may resume normal activities  As you do not have any worsening or new symptoms will ask that you call our office in 2 days to advise if you are still having loose stool  If you are at that time we may need to look for other causes such as possibly your metformin although you did not have diarrhea or loose stool with this before  As always continue your isolation at this time but you are aware to contact our office if you have any questions or concerns  [address]    Your healthcare provider and/or public health staff have evaluated you and have determined that you do not need to remain in the hospital at this time   At this time you can be isolated at home where you will be monitored by staff from your local or state health department   You should carefully follow the prevention and isolation steps below until a healthcare provider or local or state health department says that you can return to your normal activities  Stay home except to get medical care    People who are mildly ill with COVID-19 are able to isolate at home during their illness  You should restrict activities outside your home, except for getting medical care  Do not go to work, school, or public areas  Avoid using public transportation, ride-sharing, or taxis  Separate yourself from other people and animals in your home    People: As much as possible, you should stay in a specific room and away from other people in your home  Also, you should use a separate bathroom, if available  Animals: You should restrict contact with pets and other animals while you are sick with COVID-19, just like you would around other people  Although there have not been reports of pets or other animals becoming sick with COVID-19, it is still recommended that people sick with COVID-19 limit contact with animals until more information is known about the virus  When possible, have another member of your household care for your animals while you are sick  If you are sick with COVID-19, avoid contact with your pet, including petting, snuggling, being kissed or licked, and sharing food  If you must care for your pet or be around animals while you are sick, wash your hands before and after you interact with pets and wear a facemask  See COVID-19 and Animals for more information  Call ahead before visiting your doctor    If you have a medical appointment, call the healthcare provider and tell them that you have or may have COVID-19  This will help the healthcare providers office take steps to keep other people from getting infected or exposed  Wear a facemask    You should wear a facemask when you are around other people (e g , sharing a room or vehicle) or pets and before you enter a healthcare providers office   If you are not able to wear a facemask (for example, because it causes trouble breathing), then people who live with you should not stay in the same room with you, or they should wear a facemask if they enter your room  Cover your coughs and sneezes    Cover your mouth and nose with a tissue when you cough or sneeze  Throw used tissues in a lined trash can  Immediately wash your hands with soap and water for at least 20 seconds or, if soap and water are not available, clean your hands with an alcohol-based hand  that contains at least 60% alcohol  Clean your hands often    Wash your hands often with soap and water for at least 20 seconds, especially after blowing your nose, coughing, or sneezing; going to the bathroom; and before eating or preparing food  If soap and water are not readily available, use an alcohol-based hand  with at least 60% alcohol, covering all surfaces of your hands and rubbing them together until they feel dry  Soap and water are the best option if hands are visibly dirty  Avoid touching your eyes, nose, and mouth with unwashed hands  Avoid sharing personal household items    You should not share dishes, drinking glasses, cups, eating utensils, towels, or bedding with other people or pets in your home  After using these items, they should be washed thoroughly with soap and water  Clean all high-touch surfaces everyday    High touch surfaces include counters, tabletops, doorknobs, bathroom fixtures, toilets, phones, keyboards, tablets, and bedside tables  Also, clean any surfaces that may have blood, stool, or body fluids on them  Use a household cleaning spray or wipe, according to the label instructions  Labels contain instructions for safe and effective use of the cleaning product including precautions you should take when applying the product, such as wearing gloves and making sure you have good ventilation during use of the product  Monitor your symptoms    Seek prompt medical attention if your illness is worsening (e g , difficulty breathing)   Before seeking care, call your healthcare provider and tell them that you have, or are being evaluated for, COVID-19  Put on a facemask before you enter the facility  These steps will help the healthcare providers office to keep other people in the office or waiting room from getting infected or exposed  Ask your healthcare provider to call the local or [facility] health department  Persons who are placed under active monitoring or facilitated self-monitoring should follow instructions provided by their local health department or occupational health professionals, as appropriate  If you have a medical emergency and need to call 911, notify the dispatch personnel that you have, or are being evaluated for COVID-19  If possible, put on a facemask before emergency medical services arrive  Discontinuing home isolation    Patients with confirmed COVID-19 should remain under home isolation precautions until the following conditions are met: They have had no fever for at least 24 hours (that is one full day of no fever without the use medicine that reduces fevers)  AND  other symptoms have improved (for example, when their cough or shortness of breath have improved)  AND  If had mild or moderate illness, at least 10 days have passed since their symptoms first appeared or if severe illness (needed oxygen) or immunosuppressed, at least 20 days have passed since symptoms first appeared  Patients with confirmed COVID-19 should also notify close contacts (including their workplace) and ask that they self-quarantine  Currently, close contact is defined as being within 6 feet for 15 minutes or more from the period 24 hours starting 48 hours before symptom onset to the time at which the patient went into isolation   Close contacts of patients diagnosed with COVID-19 should be instructed by the patient to self-quarantine for 14 days from the last time of their last contact with the patient       Source: [employer]    I have spent 12 minutes directly with the patient  Greater than 50% of this time was spent in counseling/coordination of care regarding: prognosis, instructions for management, patient and family education, importance of treatment compliance and impressions  Encounter provider Shanae Moreno PA-C    Provider located at [address] [place] [id number] [phone]    Recent Visits  Date Type Provider Dept   01/08/21 Telephone [facility]   01/07/21 [facility] 73, [facility]   01/06/21 Telephone Shanae Moreno PA-C  [address] [facility] recent visits within past 7 days and meeting all other requirements     Today's Visits  Date Type Provider Dept   01/12/21 Telemedicine Shanae Moreno [address] [place] [address] today's visits and meeting all other requirements     Future Appointments  No visits were found meeting these conditions  Showing future appointments within next 150 days and meeting all other requirements        Patient agrees to participate in a virtual check in via telephone or video visit instead of presenting to the office to address urgent/immediate medical needs  Patient is aware this is a billable service  After connecting through Telephone, the patient was identified by name and date of birth  Ana Paula Gorman  was informed that this was a telemedicine visit and that the exam was being conducted confidentially over secure lines  My office door was closed  No one else was in the room  Ana Paula Gorman  acknowledged consent and understanding of privacy and security of the telemedicine visit  I informed the patient that I have reviewed his record in Epic and presented the opportunity for him to ask any questions regarding the visit today  The patient agreed to participate      It was my intent to perform this visit via video technology but the patient was not able to do a video connection so the visit was completed via audio telephone only  Subjective:   Cesar Martinez  is a 62 y o  male who has been screened for COVID-19  Symptom change since last report: improving  Patient's symptoms include cough (a little) and diarrhea  Patient denies fever, chills, sore throat, anosmia, loss of taste, shortness of breath, chest tightness, nausea, vomiting and myalgias  Les Alvarez has been staying home and has isolated themselves in his home  He is taking care to not share personal items and is cleaning all surfaces that are touched often, like counters, tabletops, and doorknobs using household cleaning sprays or wipes  He is wearing a mask when he leaves his room  Date of symptom onset: 12/28/2020  Date of positive COVID-19 PCR: 1/4/2021    This is 2nd COVID follow-up for patient who tested positive on January 4th  Patient reports had one episode of stomach pain on 1/10/2021 lasted a few hours resolved  Patient states still having some diarrhea but not watery not soft  States eating OK same amount and type    Drinking plenty of liquids   Sleeping well    States overall improving, no new symptoms        Lab Results   Component Value Date    SARSCOV2 Detected (A) 01/04/2021     Past Medical History:   Diagnosis Date    Diabetes mellitus ([facility] [address] )     History of gastroesophageal reflux (GERD)     Hypertension     Last assessed: 10/24/13    Sleep apnea      Past Surgical History:   Procedure Laterality Date    AMPUTATION      of Finger, with Neurectomy and flaps     Current Outpatient Medications   Medication Sig Dispense Refill    ammonium lactate (AMLACTIN) 12 % lotion Apply topically 2 (two) times a day 400 g 5    atorvastatin (LIPITOR) 40 mg tablet TAKE 1 TABLET BY MOUTH EVERY DAY 90 tablet 1    carvedilol (COREG) 6 25 mg tablet Take 6 25 mg by mouth      cetirizine (ZyrTEC) 10 MG chewable tablet Chew 1 tablet (10 mg total) daily 5 tablet 0    [employer] D3 50 MCG (2000 UT) CAPS TAKE 1 CAPSULE BY MOUTH EVERY DAY 30 capsule 5    gabapentin (NEURONTIN) 300 mg capsule TAKE 1 CAPSULE BY MOUTH THREE TIMES A DAY 90 capsule 2    glucose blood (FREESTYLE LITE) test strip TEST ONCE DAILY 100 each 3    lisinopril (ZESTRIL) 40 mg tablet TAKE 1 TABLET BY MOUTH EVERY DAY 90 tablet 1    loratadine (CLARITIN) 10 mg tablet Take 10 mg by mouth daily      metFORMIN (GLUCOPHAGE) 1000 MG tablet Take 1 tablet (1,000 mg total) by mouth 2 (two) times a day with meals 180 tablet 1    potassium chloride (K-DUR,KLOR-CON) 20 mEq tablet Take 40 mEq by mouth      pramipexole (MIRAPEX) 0 125 mg tablet TAKE 1 TABLET BY MOUTH DAILY 30 tablet 5    torsemide (DEMADEX) 20 mg tablet TAKE 2 TABLETS IN THE AM AND 1 TABLET IN THE AFTERNOON   1     No current facility-administered medications for this visit  No Known Allergies    Review of Systems   Constitutional: Negative for chills and fever  Feels a bit tired but Not fatigue   HENT: Negative for sore throat  Respiratory: Positive for cough (a little)  Negative for chest tightness and shortness of breath  Gastrointestinal: Positive for diarrhea  Negative for nausea and vomiting  Musculoskeletal: Negative for myalgias  Skin: Negative for rash  Objective: There were no vitals filed for this visit  Physical Exam  Constitutional:       Comments: Not able to complete as telephone only  Speaking clearly, no coughing       VIRTUAL VISIT DISCLAIMER    Camilla Douglas  acknowledges that he has consented to an online visit or consultation  He understands that the online visit is based solely on information provided by him, and that, in the absence of a face-to-face physical evaluation by the physician, the diagnosis he receives is both limited and provisional in terms of accuracy and completeness  This is not intended to replace a full medical face-to-face evaluation by the physician   Camilla Douglas  understands and accepts these terms

## 2021-01-14 ENCOUNTER — TELEPHONE (OUTPATIENT)
Dept: INTERNAL MEDICINE CLINIC | Facility: CLINIC | Age: 61
End: 2021-01-14

## 2021-01-14 DIAGNOSIS — R19.7 DIARRHEA IN ADULT PATIENT: Primary | ICD-10-CM

## 2021-01-14 RX ORDER — LOPERAMIDE HYDROCHLORIDE 2 MG/1
2 CAPSULE ORAL 4 TIMES DAILY PRN
Qty: 15 CAPSULE | Refills: 0 | Status: SHIPPED | OUTPATIENT
Start: 2021-01-14 | End: 2021-05-11 | Stop reason: ALTCHOICE

## 2021-01-14 NOTE — TELEPHONE ENCOUNTER
PATIENT CALLED, SAID HE WAS TOLD TO LET SANDRA KNOW ON Thursday IF HE IS STILL HAVING LOOSE STOOLS  PATIENT HAS CONFIRMED THAT THIS CONDITION IS STILL PERSISTING    PLEASE REVIEW AND ADVISE

## 2021-01-15 ENCOUNTER — TELEPHONE (OUTPATIENT)
Dept: INTERNAL MEDICINE CLINIC | Facility: CLINIC | Age: 61
End: 2021-01-15

## 2021-01-15 NOTE — TELEPHONE ENCOUNTER
Called to inform patient, patient states that if this medication is not covered he has no way of paying for it  Patient is going to reach out to pharmacy to see if medication is covered  Patient would like to know what he should do if it is not covered as he cannot afford at this time   Please advise

## 2021-01-15 NOTE — TELEPHONE ENCOUNTER
Patient called to inquire when his quarantine would be over  Upon reading last visit notes, I see that he must be without symptoms for at least 20 days from onset of symptoms AND other symptoms have improved  PT  stated he is currently having diarrhea on and off but it is improving and would therefore like to know if the same guidelines apply  Thank you

## 2021-01-19 RX ORDER — TORSEMIDE 20 MG/1
TABLET ORAL
Refills: 1 | Status: CANCELLED | OUTPATIENT
Start: 2021-01-19

## 2021-01-19 NOTE — TELEPHONE ENCOUNTER
Called and informed patient as per note, patient understood  Patient has not had diarrhea in 24 hours and is currently not taking any medication  I informed him he may resume normal activities  Patient understood and had no further questions

## 2021-01-19 NOTE — TELEPHONE ENCOUNTER
Name of medication, dose, quantity and frequency  Requested Prescriptions     Pending Prescriptions Disp Refills    torsemide (DEMADEX) 20 mg tablet   1       Number of refills left:    Amount of medication left:    Pharmacy verified and updated    Additional information:

## 2021-01-19 NOTE — TELEPHONE ENCOUNTER
Please disregard  I called pharmacy and spoke to pharmacist Maren Ruvalcaba and he stated patient just got 90 day supply and still has 2 more refills, I called and informed patient and he is going to check on it

## 2021-01-27 DIAGNOSIS — E11.9 TYPE 2 DIABETES MELLITUS WITHOUT COMPLICATION, WITHOUT LONG-TERM CURRENT USE OF INSULIN (HCC): ICD-10-CM

## 2021-02-18 DIAGNOSIS — I10 BENIGN ESSENTIAL HYPERTENSION: ICD-10-CM

## 2021-02-18 RX ORDER — LISINOPRIL 40 MG/1
TABLET ORAL
Qty: 90 TABLET | Refills: 1 | OUTPATIENT
Start: 2021-02-18

## 2021-02-26 DIAGNOSIS — I10 BENIGN ESSENTIAL HYPERTENSION: ICD-10-CM

## 2021-02-26 PROCEDURE — 4010F ACE/ARB THERAPY RXD/TAKEN: CPT | Performed by: PHYSICIAN ASSISTANT

## 2021-02-26 RX ORDER — LISINOPRIL 40 MG/1
TABLET ORAL
Qty: 90 TABLET | Refills: 1 | Status: SHIPPED | OUTPATIENT
Start: 2021-02-26 | End: 2021-08-05

## 2021-03-02 ENCOUNTER — TELEPHONE (OUTPATIENT)
Dept: INTERNAL MEDICINE CLINIC | Facility: CLINIC | Age: 61
End: 2021-03-02

## 2021-03-02 NOTE — TELEPHONE ENCOUNTER
Patient called, asking how does he know when he no longer has Covid? Does he get retested? I told him no, he would not get retested  I told him he would have to complete his quarantine and be asymptomatic  Patient began to argue with me that there are people who have Covid for 9 months and he still has chest congestion  I offered him an appt with Sammi Piedra and he demanded to speak with a "nurse" that works with her    ?    Patient hung up the phone before I could transfer the call

## 2021-03-02 NOTE — TELEPHONE ENCOUNTER
JUSTO -     Spoke to patient, he is concerned that he is still having chest congestion and wants to know if that means he still has COVID  He tested positive on 12/28/2021  I advised him once he has finished his 2 week quarantine he is able to return to normal activity  I also advised him he may have lingering symptoms for several weeks to months  I advised if he develops fever or becomes weak then he needs to contact us back for a virtual appt with Renato Nicole  He wants to be retested to see if it is still positive  I advised him that a test could show positive for up to 90 days so retesting at this time is not warranted   Patient verbalized understanding and will back as needed

## 2021-03-02 NOTE — TELEPHONE ENCOUNTER
Patient called and left voice message on nurse line requesting script for compression stockings to be faxed to [facility]       [phone] 45 yo P3 with symptomatic fibroid uterus and desiring definitive treatment by minimally invasive myomectomy. I discussed over several office visits and today at presurgical planning all the implications of this procedure and the alternatives. She is aware of the potential for laparotomy, hysterectomy , blood transfusion or any other potential surgeries due to complications. All her risks/ benefits, short/long term implications and associated risks She verbalized understanding of all explanations. All her questions were answered

## 2021-03-03 DIAGNOSIS — M79.89 LEG SWELLING: ICD-10-CM

## 2021-03-03 DIAGNOSIS — I87.2 VENOUS INSUFFICIENCY: Primary | ICD-10-CM

## 2021-03-04 NOTE — TELEPHONE ENCOUNTER
Order signed by attending Dr Huy Doshi and faxed  Confirmation received an placed in scanning folder at the nurse station

## 2021-04-06 DIAGNOSIS — E11.21 CONTROLLED TYPE 2 DIABETES MELLITUS WITH DIABETIC NEPHROPATHY, WITHOUT LONG-TERM CURRENT USE OF INSULIN (HCC): ICD-10-CM

## 2021-04-06 DIAGNOSIS — G47.61 PERIODIC LIMB MOVEMENT SLEEP DISORDER: ICD-10-CM

## 2021-04-06 DIAGNOSIS — E11.9 TYPE 2 DIABETES MELLITUS WITHOUT COMPLICATION, WITHOUT LONG-TERM CURRENT USE OF INSULIN (HCC): ICD-10-CM

## 2021-04-06 RX ORDER — ATORVASTATIN CALCIUM 40 MG/1
TABLET, FILM COATED ORAL
Qty: 90 TABLET | Refills: 1 | Status: SHIPPED | OUTPATIENT
Start: 2021-04-06 | End: 2021-11-05

## 2021-04-06 RX ORDER — GABAPENTIN 300 MG/1
CAPSULE ORAL
Qty: 90 CAPSULE | Refills: 2 | Status: SHIPPED | OUTPATIENT
Start: 2021-04-06 | End: 2021-07-06

## 2021-04-20 ENCOUNTER — APPOINTMENT (OUTPATIENT)
Dept: LAB | Facility: HOSPITAL | Age: 61
End: 2021-04-20
Payer: COMMERCIAL

## 2021-04-20 DIAGNOSIS — E11.9 TYPE 2 DIABETES MELLITUS WITHOUT COMPLICATION, WITHOUT LONG-TERM CURRENT USE OF INSULIN (HCC): ICD-10-CM

## 2021-04-20 DIAGNOSIS — E78.00 HYPERCHOLESTEROLEMIA: ICD-10-CM

## 2021-04-20 LAB
ALBUMIN SERPL BCP-MCNC: 3.3 G/DL (ref 3.5–5)
ALP SERPL-CCNC: 84 U/L (ref 46–116)
ALT SERPL W P-5'-P-CCNC: 84 U/L (ref 12–78)
ANION GAP SERPL CALCULATED.3IONS-SCNC: 4 MMOL/L (ref 4–13)
AST SERPL W P-5'-P-CCNC: 57 U/L (ref 5–45)
BILIRUB SERPL-MCNC: 0.46 MG/DL (ref 0.2–1)
BUN SERPL-MCNC: 13 MG/DL (ref 5–25)
CALCIUM ALBUM COR SERPL-MCNC: 9.6 MG/DL (ref 8.3–10.1)
CALCIUM SERPL-MCNC: 9 MG/DL (ref 8.3–10.1)
CHLORIDE SERPL-SCNC: 105 MMOL/L (ref 100–108)
CHOLEST SERPL-MCNC: 97 MG/DL (ref 50–200)
CO2 SERPL-SCNC: 28 MMOL/L (ref 21–32)
CREAT SERPL-MCNC: 0.86 MG/DL (ref 0.6–1.3)
CREAT UR-MCNC: 283 MG/DL
EST. AVERAGE GLUCOSE BLD GHB EST-MCNC: 197 MG/DL
GFR SERPL CREATININE-BSD FRML MDRD: 95 ML/MIN/1.73SQ M
GLUCOSE P FAST SERPL-MCNC: 143 MG/DL (ref 65–99)
HBA1C MFR BLD: 8.5 %
HDLC SERPL-MCNC: 36 MG/DL
LDLC SERPL CALC-MCNC: 30 MG/DL (ref 0–100)
MICROALBUMIN UR-MCNC: 13.8 MG/L (ref 0–20)
MICROALBUMIN/CREAT 24H UR: 5 MG/G CREATININE (ref 0–30)
POTASSIUM SERPL-SCNC: 4 MMOL/L (ref 3.5–5.3)
PROT SERPL-MCNC: 7.3 G/DL (ref 6.4–8.2)
SODIUM SERPL-SCNC: 137 MMOL/L (ref 136–145)
TRIGL SERPL-MCNC: 156 MG/DL

## 2021-04-20 PROCEDURE — 80053 COMPREHEN METABOLIC PANEL: CPT

## 2021-04-20 PROCEDURE — 80061 LIPID PANEL: CPT

## 2021-04-20 PROCEDURE — 82043 UR ALBUMIN QUANTITATIVE: CPT

## 2021-04-20 PROCEDURE — 36415 COLL VENOUS BLD VENIPUNCTURE: CPT

## 2021-04-20 PROCEDURE — 82570 ASSAY OF URINE CREATININE: CPT

## 2021-04-20 PROCEDURE — 83036 HEMOGLOBIN GLYCOSYLATED A1C: CPT

## 2021-05-05 DIAGNOSIS — G47.61 PERIODIC LIMB MOVEMENTS OF SLEEP: ICD-10-CM

## 2021-05-09 RX ORDER — PRAMIPEXOLE DIHYDROCHLORIDE 0.12 MG/1
TABLET ORAL
Qty: 30 TABLET | Refills: 5 | Status: SHIPPED | OUTPATIENT
Start: 2021-05-09 | End: 2021-11-05

## 2021-05-11 ENCOUNTER — OFFICE VISIT (OUTPATIENT)
Dept: INTERNAL MEDICINE CLINIC | Facility: CLINIC | Age: 61
End: 2021-05-11

## 2021-05-11 VITALS
BODY MASS INDEX: 48.32 KG/M2 | HEIGHT: 65 IN | DIASTOLIC BLOOD PRESSURE: 81 MMHG | OXYGEN SATURATION: 96 % | SYSTOLIC BLOOD PRESSURE: 137 MMHG | TEMPERATURE: 97.4 F | HEART RATE: 89 BPM | WEIGHT: 290 LBS

## 2021-05-11 DIAGNOSIS — R79.89 ELEVATED LFTS: ICD-10-CM

## 2021-05-11 DIAGNOSIS — G47.33 OBSTRUCTIVE SLEEP APNEA: ICD-10-CM

## 2021-05-11 DIAGNOSIS — Z12.11 COLON CANCER SCREENING: ICD-10-CM

## 2021-05-11 DIAGNOSIS — L84 CALLUS OF FOOT: ICD-10-CM

## 2021-05-11 DIAGNOSIS — E66.01 CLASS 3 SEVERE OBESITY DUE TO EXCESS CALORIES WITH SERIOUS COMORBIDITY AND BODY MASS INDEX (BMI) OF 45.0 TO 49.9 IN ADULT (HCC): Chronic | ICD-10-CM

## 2021-05-11 DIAGNOSIS — I10 BENIGN ESSENTIAL HYPERTENSION: ICD-10-CM

## 2021-05-11 DIAGNOSIS — E11.42 DIABETIC POLYNEUROPATHY ASSOCIATED WITH TYPE 2 DIABETES MELLITUS (HCC): ICD-10-CM

## 2021-05-11 DIAGNOSIS — L85.3 XEROSIS CUTIS: ICD-10-CM

## 2021-05-11 DIAGNOSIS — E78.00 HYPERCHOLESTEROLEMIA: ICD-10-CM

## 2021-05-11 DIAGNOSIS — E11.9 TYPE 2 DIABETES MELLITUS WITHOUT COMPLICATION, WITHOUT LONG-TERM CURRENT USE OF INSULIN (HCC): Primary | ICD-10-CM

## 2021-05-11 PROBLEM — U07.1 COVID-19 VIRUS INFECTION: Status: RESOLVED | Noted: 2021-01-07 | Resolved: 2021-05-11

## 2021-05-11 PROBLEM — Z86.16 HISTORY OF COVID-19: Status: ACTIVE | Noted: 2021-05-11

## 2021-05-11 PROCEDURE — 99214 OFFICE O/P EST MOD 30 MIN: CPT | Performed by: PHYSICIAN ASSISTANT

## 2021-05-11 PROCEDURE — 3725F SCREEN DEPRESSION PERFORMED: CPT | Performed by: PHYSICIAN ASSISTANT

## 2021-05-11 RX ORDER — AMMONIUM LACTATE 12 G/100G
LOTION TOPICAL 2 TIMES DAILY
Qty: 400 G | Refills: 5 | Status: SHIPPED | OUTPATIENT
Start: 2021-05-11

## 2021-05-11 RX ORDER — EMPAGLIFLOZIN 10 MG/1
10 TABLET, FILM COATED ORAL EVERY MORNING
Qty: 90 TABLET | Refills: 0 | Status: SHIPPED | OUTPATIENT
Start: 2021-05-11 | End: 2021-08-05

## 2021-05-11 NOTE — PROGRESS NOTES
Assessment/Plan: On your visit today we discussed your lab results  Reviewed that your sugar has significantly increased and your A1c is now 8 5%  Will continue the metformin twice daily and we are adding in Jardiance 1 tablet daily in the morning  We reviewed the importance of remaining well hydrated with this new medication and that if you develop any symptoms consistent with a urinary or genital infection to call our office at that time for further advice  Script provided for follow-up blood test as dated in 3 months  Please resume your care under [facility] weight management as you report you were down 45 lb previously but have regained that weight  We also discussed that your labs show a slight increase in your liver enzymes  Please schedule the ultrasound and follow-up labs have been ordered  Continue to follow-up with your cardiologist at [facility] as scheduled  Referral to podiatry today for calluses, dry skin and thick brittle toenails  I did send a prescription for the lotion for your feet however we did review if not covered by your insurance you can purchase this over-the-counter or you can purchase Aquaphor or CeraVe    No change to cholesterol medications today  We will get you scheduled for your COVID vaccines  No problem-specific Assessment & Plan notes found for this encounter  Diagnoses and all orders for this visit:    Type 2 diabetes mellitus without complication, without long-term current use of insulin (HCC)  -     Empagliflozin (Jardiance) 10 MG TABS; Take 1 tablet (10 mg total) by mouth every morning  -     Ambulatory referral to Podiatry; Future  -     Comprehensive metabolic panel;  Future  -     Hemoglobin A1C; Future    Benign essential hypertension    Hypercholesterolemia    Obstructive sleep apnea    Diabetic polyneuropathy associated with type 2 diabetes mellitus (HCC)    Elevated LFTs  -     US right upper quadrant; Future    Class 3 severe obesity due to excess calories with serious comorbidity and body mass index (BMI) of 45 0 to 49 9 in adult ([facility] [address] )    Callus of foot  -     Ambulatory referral to Podiatry; Future    Xerosis cutis  -     Ambulatory referral to Podiatry; Future  -     ammonium lactate (AmLactin) 12 % lotion; Apply topically 2 (two) times a day    Colon cancer screening  -     Ambulatory referral to Gastroenterology; Future          Subjective:      Patient ID: Jennifer Tesfaye  is a 62 y o  male  Patient presents today for routine follow-up of chronic medical conditions and lab review  Patient is followed for type 2 diabetes, hypertension, hyperlipidemia and obesity  Patient's labs unfortunately show that his A1c has increased  Was 7 1% now 8 5%  Patient however admits that he did drink some OJ but it was not regularly  reviewed with patient that having consumed 1 or 2 gal of orange juice over 3 month  Would not have increased his A1c that much  States at one point was down to 245Lb 3 months ago so did not follow up with weight management    Patient states that he felt far better when his weight was down  He could breathe better and walk longer distances  Lipid profile is excellent no adjustment to medications      Discussed with patient that even if he goes back to weight management and successfully loses weight once again we still need to treat the sugar where it is at now  Patient will continue on the metformin twice daily  He states he does the same thing that they did for him in the hospital which is put the pill in a little bit of applesauce because it is too big for him to swallow on its own  Patient is agreeable to starting Jardiance  He is aware if he develops any urinary symptoms or genital infection symptoms such as burning and pain with urination decreased urinary frequency to contact our office right away        Patient does have the ability to drink water without difficulty  As noted previously patient following with [facility]  Last visit October of 2020  Patient   At 1 point suspected to have heart failure however it was determined that the swelling in his legs with secondary to severe obesity  Patient has been maintained on the diuretics and has not required readmission     also reviewed slight increase in liver enzymes on follow-up testing  Likely secondary to regaining 45 lb and fatty liver disease  Will be re-evaluated and also ordered ultrasound  Continue your gabapentin for diabetic neuropathy  The following portions of the patient's history were reviewed and updated as appropriate: allergies, current medications, past family history, past medical history, past social history, past surgical history and problem list     Review of Systems   Constitutional: Negative for chills and fever  Respiratory: Negative  Negative for cough, chest tightness and shortness of breath  Cardiovascular: Positive for leg swelling (sometimes)  Gastrointestinal: Negative  Negative for abdominal pain  Endocrine: Positive for polydipsia and polyphagia  Negative for cold intolerance and polyuria  Musculoskeletal: Negative  Skin: Negative for rash  Dry skin   Neurological: Positive for numbness  Negative for headaches  Psychiatric/Behavioral: Negative  Objective:      /81 (BP Location: Left arm)   Pulse 89   Temp (!) 97 4 °F (36 3 °C) (Temporal)   Ht 5' 5" (1 651 m)   Wt 132 kg (290 lb)   SpO2 96%   BMI 48 26 kg/m²          Physical Exam  Vitals signs and nursing note reviewed  Constitutional:       General: He is not in acute distress  Appearance: He is obese  He is not ill-appearing  HENT:      Head: Normocephalic  Eyes:      Extraocular Movements: Extraocular movements intact  Conjunctiva/sclera: Conjunctivae normal    Neck:      Musculoskeletal: Neck supple  Vascular: No carotid bruit  Cardiovascular:      Rate and Rhythm: Normal rate and regular rhythm  Pulses: Pulses are weak  Dorsalis pedis pulses are 1+ on the right side and 1+ on the left side  Heart sounds: Normal heart sounds  Comments: Non pitting  Pulmonary:      Effort: Pulmonary effort is normal       Breath sounds: Normal breath sounds  Abdominal:      General: Bowel sounds are normal  There is no distension  Tenderness: There is no abdominal tenderness  Musculoskeletal:      Right lower le+ Edema present  Left lower le+ Edema present  Feet:      Right foot:      Skin integrity: Callus and dry skin present  Left foot:      Skin integrity: Callus and dry skin present  Skin:     Comments: Patient does have intact sensation to monofilament testing  Does need to see Podiatry  Has thickened brittle darkened toenails bilateral feet all 10  Also fairly significant dry skin with callus formation   Neurological:      Mental Status: He is alert  Psychiatric:         Mood and Affect: Mood normal          Thought Content: Thought content normal            PHQ-9 Depression Screening    PHQ-9:   Frequency of the following problems over the past two weeks:      Little interest or pleasure in doing things: 0 - not at all  Feeling down, depressed, or hopeless: 0 - not at all  PHQ-2 Score: 0         Patient's shoes and socks removed  Right Foot/Ankle   Right Foot Inspection  Skin Exam: dry skin, callus and callus                            Sensory     Proprioception: intact   Monofilament testing: intact  Vascular    The right DP pulse is 1+  Left Foot/Ankle  Left Foot Inspection  Skin Exam: dry skin and callus                                         Sensory     Proprioception: intact  Monofilament: intact  Vascular    The left DP pulse is 1+  Assign Risk Category:  No deformity present;  No loss of protective sensation; Weak pulses       Risk: 1

## 2021-05-11 NOTE — PATIENT INSTRUCTIONS
On your visit today we discussed your lab results  Reviewed that your sugar has significantly increased and your A1c is now 8 5%  Will continue the metformin twice daily and we are adding in Jardiance 1 tablet daily in the morning  We reviewed the importance of remaining well hydrated with this new medication and that if you develop any symptoms consistent with a urinary or genital infection to call our office at that time for further advice  Script provided for follow-up blood test as dated in 3 months  Please resume your care under [facility] weight management as you report you were down 45 lb previously but have regained that weight  We also discussed that your labs show a slight increase in your liver enzymes  Please schedule the ultrasound and follow-up labs have been ordered  Continue to follow-up with your cardiologist at [facility] as scheduled  Referral to podiatry today for calluses, dry skin and thick brittle toenails  I did send a prescription for the lotion for your feet however we did review if not covered by your insurance you can purchase this over-the-counter or you can purchase Aquaphor or CeraVe    No change to cholesterol medications today  We will get you scheduled for your COVID vaccines

## 2021-05-13 ENCOUNTER — TELEPHONE (OUTPATIENT)
Dept: INTERNAL MEDICINE CLINIC | Facility: CLINIC | Age: 61
End: 2021-05-13

## 2021-05-13 NOTE — TELEPHONE ENCOUNTER
Prior authorization started through [facility] for this medication  Faxed over with last visit note and confirmation received  We will follow up in three business day

## 2021-05-13 NOTE — TELEPHONE ENCOUNTER
[facility] requires a prior authorization for Empagliflozin (Jardiance) 10 MG TABS    Thank you!

## 2021-05-17 ENCOUNTER — CONSULT (OUTPATIENT)
Dept: MULTI SPECIALTY CLINIC | Facility: CLINIC | Age: 61
End: 2021-05-17

## 2021-05-17 VITALS
SYSTOLIC BLOOD PRESSURE: 137 MMHG | BODY MASS INDEX: 48.32 KG/M2 | HEART RATE: 86 BPM | WEIGHT: 290 LBS | DIASTOLIC BLOOD PRESSURE: 81 MMHG | HEIGHT: 65 IN | TEMPERATURE: 97.5 F

## 2021-05-17 DIAGNOSIS — E11.9 TYPE 2 DIABETES MELLITUS WITHOUT COMPLICATION, WITHOUT LONG-TERM CURRENT USE OF INSULIN (HCC): ICD-10-CM

## 2021-05-17 DIAGNOSIS — I87.2 VENOUS STASIS DERMATITIS OF BOTH LOWER EXTREMITIES: ICD-10-CM

## 2021-05-17 PROCEDURE — 99243 OFF/OP CNSLTJ NEW/EST LOW 30: CPT | Performed by: PODIATRIST

## 2021-05-17 NOTE — PROGRESS NOTES
At [facility]  62 y o  male MRN: [id number]  Encounter: [id number]      Assessment/Plan        Diagnoses and all orders for this visit:    Type 2 diabetes mellitus without complication, without long-term current use of insulin ([facility] )  -     Ambulatory referral to Podiatry    Callus of foot  -     Ambulatory referral to Podiatry    Xerosis cutis  -     Ambulatory referral to Podiatry       Plan:   Patient was seen/examined  All questions and concerns addressed   Educated patient on proper diabetic foot care; checking feet every day, wearing white socks, always wearing diabetic shoes even in house, tight glycemic control, proper low-sugar diet and exercise   Encourage patient continue with elevation and compression stockings while at home for edema control and prevention of venous stasis progression   RTC in 1 year(s)    Dr Jet Serrano was present during this entire procedure  History of Present Illness     HPI:  Manuela Leal  is a 62 y o  male who presents for diabetic risk assessment  Patient denies numbness/tingling  They state their last HbA1c was 8 5%  Patient does endorse previous history of venous stasis wounds, which he states have been well healed at this time  He does endorse mild weeping several weeks ago which has since resolved  He reports elevating his extremities at home in his recliner as well as utilizing compression stockings, however he states he needs a new pair  He has no other pedal complaints at this time  The patient denies any nausea, vomiting, fever, chills, shortness of breath, or chest pains  Review of Systems   Constitutional: Negative  HENT: Negative  Eyes: Negative  Respiratory: Negative  Cardiovascular: Negative  Gastrointestinal: Negative  Musculoskeletal: Negative   Skin: Negative  Neurological: Negative          Historical Information   Past Medical History:   Diagnosis Date    COVID-19 virus infection 1/7/2021    Diabetes mellitus ([facility] [address] )     History of gastroesophageal reflux (GERD)     Hypertension     Last assessed: 10/24/13    Sleep apnea      Past Surgical History:   Procedure Laterality Date    AMPUTATION      of Finger, with Neurectomy and flaps     Social History   Social History     Substance and Sexual Activity   Alcohol Use Never    Frequency: Never    Binge frequency: Never     Social History     Substance and Sexual Activity   Drug Use Never     Social History     Tobacco Use   Smoking Status Former Smoker    Quit date:     Years since quittin 3   Smokeless Tobacco Never Used     Family History:   Family History   Problem Relation Age of Onset    Uterine cancer Mother     Diabetes Father     Vascular Disease Father         peripheral    Bone cancer Sister     Other Brother         severe head trauma       Meds/Allergies   (Not in a hospital admission)    No Known Allergies    Objective     Current Vitals:   Blood Pressure: 137/81 (21 1416)  Pulse: 86 (21 1416)  Temperature: 97 5 °F (36 4 °C) (21 1416)  Temp Source: Temporal (21 141)  Height: 5' 5" (165 1 cm) (21 1416)  Weight - Scale: 132 kg (290 lb) (21 1416)        /81 (BP Location: Right arm, Patient Position: Sitting, Cuff Size: Large)   Pulse 86   Temp 97 5 °F (36 4 °C) (Temporal)   Ht 5' 5" (1 651 m)   Wt 132 kg (290 lb)   BMI 48 26 kg/m²       Lower Extremity Exam:    Vascular: Right foot DP/PT +2                   Left foot DP/PT +2                   There is trace lower extremity edema bilateral     Musculoskeletal: There is 5/5 strength throughout the bilateral lower extremity  full ankle range of motion with well maintained subtalar range of motion  There is no tenderness over the foot and ankle                 There is not foot deformities    Neurological: Sensation to 5 07 Theodore-Wolfgang nylon filament: positive bilaterally      Vibratory sense to distal Foot positive bilaterally      Sharp/Dull sense is positive bilaterally      Dermatology: Skin Condition:  Chronic venous stasis changes noted to bilateral lower extremities including xerosis, hemosiderin deposits  There is not evidence of macerated tissue between toe spaces  Nail Exam: onychomycosis of the toenails       Open ulcerations: No     Calluses: No    Risk Category: 0 = No loss of protective sensation

## 2021-05-19 ENCOUNTER — TELEPHONE (OUTPATIENT)
Dept: INTERNAL MEDICINE CLINIC | Facility: CLINIC | Age: 61
End: 2021-05-19

## 2021-05-19 NOTE — TELEPHONE ENCOUNTER
Patient would like order for diabetic shoes  [place] has confirmed they will be covered  He has been following with Podiatry clinic that has recommended them as well  He would like to  script once it is ready so he may  shoes from a specific DME supplier of his choice  I will call once script is ready  Thank you

## 2021-05-20 NOTE — TELEPHONE ENCOUNTER
Patient called super upset & is requesting this script to be completed right away  Advised patient the Podiatrist will be in on Monday  He was super upset & feels like we're giving him the "run around"  I did explain the process & explained that the specialist do rotate weekly at our office  Patient is requesting this to be done by Monday   Please review

## 2021-05-24 NOTE — TELEPHONE ENCOUNTER
Patient has been calling every day since the beginning of this task to ask why it is taking so long  I inform him each time that Podiatry clinic is only here on Monday and Tuesdays so we must wait until they can write order  He did not understand why Abhijeet Garcia wouldn't just be able to write the order herself and after I explained why, he understood and said he might try calling another podiatry office he used to see to see if they can place order for him  I told him as he is not following with them anymore, they will not do that  He said he will try and if they say no, he will wait until Podiatry clinic here places order for him  It is now Monday and he has been calling every hour asking to speak ONLY to me in regards to seeing if the order has been placed  My coworkers, [place] and Clint Deleon, both attempted to help him but he refused to speak to them and kept requesting me  When I did return his call, it went to voicemail and I left a message letting him know that at this point, it is out of my hands, as we are still waiting on POD clinic to sign order  Not sure why he keeps calling, as there is nothing more I can do

## 2021-06-01 ENCOUNTER — TELEPHONE (OUTPATIENT)
Dept: INTERNAL MEDICINE CLINIC | Facility: CLINIC | Age: 61
End: 2021-06-01

## 2021-06-01 DIAGNOSIS — L85.3 XEROSIS CUTIS: ICD-10-CM

## 2021-06-01 RX ORDER — AMMONIUM LACTATE 12 G/100G
LOTION TOPICAL
Qty: 400 ML | Refills: 5 | OUTPATIENT
Start: 2021-06-01

## 2021-06-01 NOTE — TELEPHONE ENCOUNTER
See alternate task regarding medication  Patient made aware lotion is not covered by insurance and will need to purchase alternative over the counter

## 2021-06-01 NOTE — TELEPHONE ENCOUNTER
Patient was told by pharmacy that ammonium lactate (AmLactin) 12 % lotion will no longer be covered by insurance  Please send out an alternative  Thank you

## 2021-06-01 NOTE — TELEPHONE ENCOUNTER
Check with patient's insurance and there are no emmollient options that are covered by patient's insurance [employer]  I called patient to inform him of this and informed him he will need to purchase over the counter, but he was not happy with this because he cannot afford to purchase it over the counter  I apologized and informed him that it is the insurance which makes these decisions  I encouraged him to speak with the pharmacist to ask what the least expensive OTC option is  Patient verbally understood

## 2021-06-14 ENCOUNTER — OFFICE VISIT (OUTPATIENT)
Dept: MULTI SPECIALTY CLINIC | Facility: CLINIC | Age: 61
End: 2021-06-14

## 2021-06-14 VITALS
BODY MASS INDEX: 48.32 KG/M2 | DIASTOLIC BLOOD PRESSURE: 86 MMHG | SYSTOLIC BLOOD PRESSURE: 148 MMHG | WEIGHT: 290 LBS | TEMPERATURE: 98 F | HEART RATE: 78 BPM | HEIGHT: 65 IN

## 2021-06-14 DIAGNOSIS — E11.9 TYPE 2 DIABETES MELLITUS WITHOUT COMPLICATION, WITHOUT LONG-TERM CURRENT USE OF INSULIN (HCC): ICD-10-CM

## 2021-06-14 DIAGNOSIS — I87.2 VENOUS INSUFFICIENCY: ICD-10-CM

## 2021-06-14 DIAGNOSIS — M79.89 LEG SWELLING: Primary | ICD-10-CM

## 2021-06-14 DIAGNOSIS — R23.8 BULLAE: ICD-10-CM

## 2021-06-14 PROCEDURE — 99213 OFFICE O/P EST LOW 20 MIN: CPT | Performed by: PODIATRIST

## 2021-06-14 PROCEDURE — 3077F SYST BP >= 140 MM HG: CPT | Performed by: PODIATRIST

## 2021-06-14 PROCEDURE — 10060 I&D ABSCESS SIMPLE/SINGLE: CPT | Performed by: PODIATRIST

## 2021-06-14 PROCEDURE — 3079F DIAST BP 80-89 MM HG: CPT | Performed by: PODIATRIST

## 2021-06-14 PROCEDURE — 3008F BODY MASS INDEX DOCD: CPT | Performed by: PODIATRIST

## 2021-06-14 PROCEDURE — 1036F TOBACCO NON-USER: CPT | Performed by: PODIATRIST

## 2021-06-14 NOTE — PATIENT INSTRUCTIONS
Diabetes and Your Skin   WHAT YOU NEED TO KNOW:   Diabetes can affect every part of your body, including your skin  Diabetes that is not well controlled can damage blood vessels and nerves  Damage to blood vessels can make it hard for blood to flow to tissues and organs  A lack of blood flow to your skin can cause ulcers that are difficult to heal  Skin ulcers are also called sores  People with diabetes can also have more bacterial skin infections than other people  Most skin conditions can be prevented with good blood sugar control  Skin sores can be hard to heal, or become life or limb-threatening, if not treated early  DISCHARGE INSTRUCTIONS:   Call your doctor or care team provider if:   · You get a severe burn or cut  · You have a sore that is painful, warm to the touch, or has redness around it  · Your sore does not get better or seems to get worse  · You have a fever  · Your blood sugar levels continue to be higher than they should be  · You have questions or concerns about your condition or care  Prevent skin sores:   · Keep your blood sugars within target range  Your care team provider will tell you what your blood sugar levels should be  High blood sugar levels increase your risk for skin infections and poor wound healing  · Keep your skin clean  Do not take hot baths or showers  They can cause your skin to get dry  Do not take a bubble bath if you have dry skin  Use moisturizing soaps  · Keep your skin from becoming too dry  Apply moisturizing lotion after baths or showers, especially in cold, dry weather  When you scratch dry, itchy skin, you can cause your skin to be open to infection  Bathe less during cold weather and use lotion to moisturize  Do not put lotion between your toes  Moisture between your toes could lead to skin breakdown  Use a humidifier to keep air in your home from being dry  · Keep areas where skin touches skin dry    Use talcum powder in areas such as armpits and groin  You may also need it under your breasts, and between your toes  Moisture in these areas can cause a fungal infection  · Treat cuts immediately  Clean minor cuts with soap and water  Cover them with sterile gauze  Follow up with your doctor or diabetes care team providers as directed:  Write down your questions so you remember to ask them during your visits  © Copyright [address] Information is for End User's use only and may not be sold, redistributed or otherwise used for commercial purposes  All illustrations and images included in CareNotes® are the copyrighted property of [employer]  or [address] [place] [address]  The above information is an  only  It is not intended as medical advice for individual conditions or treatments  Talk to your doctor, nurse or pharmacist before following any medical regimen to see if it is safe and effective for you

## 2021-06-14 NOTE — PROGRESS NOTES
Twila Peterson 1  62 y o  male MRN: [id number]  Encounter: [id number]    Assessment/Plan        Diagnoses and all orders for this visit:    Leg swelling  -     Compression Stocking    Type 2 diabetes mellitus without complication, without long-term current use of insulin (HCC)    Venous insufficiency  -     Compression Stocking    Bullae       Plan:   Patient was seen and examined with all their questions and concerns addressed   Left leg serous bullae was lanced with a #15 blade without incident  Underlying epithelium was intact and healthy  Approximately 3cc of serous drainage exuded from the area   Patient was given prescription for compression socks with 20-30mmHg pressure since his previous pair is in poor condition   Educated patient on proper diabetic foot care; checking feet every day, wearing white socks, always wearing diabetic shoes even in house, tight glycemic control, proper low-sugar diet and exercise    Patient was re-appointed for 1-month    - Dr William Lo was available/present for entirety of patient encounter and present for all procedures  History of Present Illness     HPI: Denis Joe  is a 62 y o  male who presents complaining of bilateral lower extremity swelling and drainage  The patient states that his legs have been swelling for the past 2-weeks and that before that they were well under control for about a 3-month duration  He states that 2-days ago he "bumped his left leg" on a piece of furniture which caused a blister to form  He denies pain to this area  The patient has no further podiatric complaints at this time  Review of Systems   Constitutional: Negative  HENT: Negative  Eyes: Negative  Respiratory: Negative  Cardiovascular: b/l venous insufficiency  Gastrointestinal: Negative  Musculoskeletal: negative  Skin: b/l venous insufficiency, b/l hemosiderosis, serous bullae left leg  Neurological: Negative          Historical Information   Past Medical History:   Diagnosis Date    COVID-19 virus infection 2021    Diabetes mellitus ([address] )     History of gastroesophageal reflux (GERD)     Hypertension     Last assessed: 10/24/13    Sleep apnea      Past Surgical History:   Procedure Laterality Date    AMPUTATION      of Finger, with Neurectomy and flaps     Social History   Social History     Substance and Sexual Activity   Alcohol Use Never     Social History     Substance and Sexual Activity   Drug Use Never     Social History     Tobacco Use   Smoking Status Former Smoker    Quit date:     Years since quittin 4   Smokeless Tobacco Never Used     Family History:   Family History   Problem Relation Age of Onset    Uterine cancer Mother     Diabetes Father     Vascular Disease Father         peripheral    Bone cancer Sister     Other Brother         severe head trauma       Meds/Allergies   (Not in a hospital admission)    No Known Allergies    Objective     Current Vitals:   Blood Pressure: 148/86 (21 1502)  Pulse: 78 (21 1502)  Temperature: 98 °F (36 7 °C) (21 1502)  Temp Source: Temporal (21 1502)  Height: 5' 5" (165 1 cm) (21 1502)  Weight - Scale: 132 kg (290 lb) (21 1502)        /86 (BP Location: Right arm, Patient Position: Sitting, Cuff Size: Large)   Pulse 78   Temp 98 °F (36 7 °C) (Temporal)   Ht 5' 5" (1 651 m)   Wt 132 kg (290 lb)   BMI 48 26 kg/m²       Lower Extremity Exam:    Foot Exam    Musculoskeletal:  MMT is 5/5 to all compartments of the LE, +3/4 edema B/L, Hallux limitus is present  Equinus is present  No pain with passive ROM of pedal joints  Vascular:   DP pulses and PT pulses are present bilaterally  , CFT< 3sec to all digits  Pedal hair is Absent  Pulse has regular rate and rhythm  Skin temperature warm to warm B/L   Bilateral LE edema, hemosiderosis, venous insufficiency     Dermatological:  Skin of the LE is normal texture, temperature, turgor  Interdigital maceration is not present  Serous bullae noted to the left lateral leg       Neurologic:  Gross sensation is intact  Protective sensation is Intact  Vibratory sensation is Intact  Sharp sensation is present

## 2021-06-15 ENCOUNTER — TELEPHONE (OUTPATIENT)
Dept: INTERNAL MEDICINE CLINIC | Facility: CLINIC | Age: 61
End: 2021-06-15

## 2021-06-15 NOTE — TELEPHONE ENCOUNTER
[facility] requires a cosignature from an MD/DO for diabetic shoe and compression stocking orders  Patient has an appointment for fitting on Friday,  6/18, and appointment will be cancelled without this order  It is currently written by a DPM, from podiatry clinic, and [facility] will not cover order unless an MD/DO signs off on it  As existing order cannot be cosigned by an internal medicine attending, please send new scripts written by an IM attending  Thank you

## 2021-06-16 NOTE — TELEPHONE ENCOUNTER
Called and spoke to Rebel at [facility] and all she needs is these scripts to be signed by an MD/DO  Scripts signed by attending Iris Reaves and faxed to [phone]  Confirmation received

## 2021-06-18 NOTE — TELEPHONE ENCOUNTER
Patient called stating his script says for one pair every 6 months patient is requesting the script to be updated to 2 pairs of compression stockings every six months  He states he use to get 2 pairs every six months   Please review

## 2021-06-21 DIAGNOSIS — I87.2 VENOUS INSUFFICIENCY: ICD-10-CM

## 2021-06-21 DIAGNOSIS — M79.89 LEG SWELLING: ICD-10-CM

## 2021-06-21 NOTE — TELEPHONE ENCOUNTER
New order placed and faxed to [address] at [phone]   [facility] for patient to advise

## 2021-06-21 NOTE — PROGRESS NOTES
Patient called and requested that his prescription for compression stockings be changed to "2-pairs every 6-months" rather than one pair  I have updated his prescription, however I do not know if his insurance company will honor this change

## 2021-06-21 NOTE — TELEPHONE ENCOUNTER
Printed and placed in [facility] folder for Dr Karen Ibrahim, podiatry as he placed last order

## 2021-06-22 NOTE — TELEPHONE ENCOUNTER
Received call from Jong [phone] at [place] [address] who states that the insurance will only pay for one compression stocking every six months  Jimmie Khan states that the patient got angry and loud when told  Jimmie Khan is giving us a heads up when the patient calls our office    Patient has appointment for 6/23/21 with Karlie

## 2021-06-23 ENCOUNTER — TELEPHONE (OUTPATIENT)
Dept: INTERNAL MEDICINE CLINIC | Facility: CLINIC | Age: 61
End: 2021-06-23

## 2021-06-23 NOTE — TELEPHONE ENCOUNTER
Nuha Giraldo from [facility] called stating patient is going through their company for compression stockings  [facility] is requesting an updated script specifying if the compression stocking should be knee high or thigh high faxed to [phone]   Please review

## 2021-06-30 DIAGNOSIS — E55.9 VITAMIN D DEFICIENCY: ICD-10-CM

## 2021-06-30 RX ORDER — CALCIUM CARBONATE/VITAMIN D3 600 MG-20
TABLET ORAL
Qty: 30 CAPSULE | Refills: 5 | Status: SHIPPED | OUTPATIENT
Start: 2021-06-30 | End: 2021-12-13

## 2021-07-06 DIAGNOSIS — G47.61 PERIODIC LIMB MOVEMENT SLEEP DISORDER: ICD-10-CM

## 2021-07-06 DIAGNOSIS — E11.9 TYPE 2 DIABETES MELLITUS WITHOUT COMPLICATION, WITHOUT LONG-TERM CURRENT USE OF INSULIN (HCC): ICD-10-CM

## 2021-07-06 RX ORDER — GABAPENTIN 300 MG/1
CAPSULE ORAL
Qty: 90 CAPSULE | Refills: 2 | Status: SHIPPED | OUTPATIENT
Start: 2021-07-06 | End: 2021-10-06

## 2021-07-19 ENCOUNTER — OFFICE VISIT (OUTPATIENT)
Dept: MULTI SPECIALTY CLINIC | Facility: CLINIC | Age: 61
End: 2021-07-19

## 2021-07-19 VITALS
HEIGHT: 65 IN | TEMPERATURE: 97.4 F | SYSTOLIC BLOOD PRESSURE: 142 MMHG | WEIGHT: 291 LBS | BODY MASS INDEX: 48.48 KG/M2 | HEART RATE: 88 BPM | DIASTOLIC BLOOD PRESSURE: 78 MMHG

## 2021-07-19 DIAGNOSIS — E11.9 TYPE 2 DIABETES MELLITUS WITHOUT COMPLICATION, WITHOUT LONG-TERM CURRENT USE OF INSULIN (HCC): ICD-10-CM

## 2021-07-19 DIAGNOSIS — G63 POLYNEUROPATHY ASSOCIATED WITH UNDERLYING DISEASE (HCC): ICD-10-CM

## 2021-07-19 DIAGNOSIS — I87.8 VENOUS STASIS: Primary | ICD-10-CM

## 2021-07-19 PROCEDURE — 99213 OFFICE O/P EST LOW 20 MIN: CPT | Performed by: PODIATRIST

## 2021-07-19 NOTE — PROGRESS NOTES
[address]  62 y o  male MRN: [id number]  Encounter: [id number]      Assessment/Plan        Diagnoses and all orders for this visit:    Venous stasis    Type 2 diabetes mellitus without complication, without long-term current use of insulin (HCC)    Polyneuropathy associated with underlying disease ([facility] )           Plan:   Patient was seen/examined  All questions and concerns addressed   Continue compression to legs bilaterally with compression stockings 20-30 mmHg   Education on proper diabetic foot care: daily foot checks, wearing compression, tight glycemic control   RTC in 3 month(s) For high risk foot care  Dr Mirna Chiu was present during this entire procedure  History of Present Illness     HPI:  Patient comes in for recheck of venous stasis wounds of the lower leg  A month ago he had a boil lanced with a 15 blade in office  Was given compressive wraps and told to follow up in 1 month  Patient states that the wound healed and then he moved to compressive stockings 20-30 millimeters Hg  And he has been wearing them every day and not at night  Patient states that at times his fingernails will catch his leg while putting on socks and he gets a little wound which heals over  He has no other concerns and complaints to the lower extremities  Would like to continue follow-up in the future with Podiatry  Patient denies fever chills nausea vomiting      Review of Systems   Constitutional: Negative  HENT: Negative  Eyes: Negative  Respiratory: Negative  Cardiovascular: Negative  Gastrointestinal: Negative  Musculoskeletal: negative  Skin:  Hemosiderin deposits  Neurological: Negative         Historical Information   Past Medical History:   Diagnosis Date    COVID-19 virus infection 1/7/2021    Diabetes mellitus ([facility] )     History of gastroesophageal reflux (GERD)     Hypertension     Last assessed: 10/24/13    Sleep apnea      Past Surgical History:   Procedure Laterality Date    AMPUTATION      of Finger, with Neurectomy and flaps     Social History   Social History     Substance and Sexual Activity   Alcohol Use Never     Social History     Substance and Sexual Activity   Drug Use Never     Social History     Tobacco Use   Smoking Status Former Smoker    Quit date:     Years since quittin 5   Smokeless Tobacco Never Used     Family History:   Family History   Problem Relation Age of Onset    Uterine cancer Mother     Diabetes Father     Vascular Disease Father         peripheral    Bone cancer Sister     Other Brother         severe head trauma       Meds/Allergies   (Not in a hospital admission)    No Known Allergies    Objective     Current Vitals:   Blood Pressure: 142/78 (21 1450)  Pulse: 88 (21 1450)  Temperature: (!) 97 4 °F (36 3 °C) (21 1450)  Temp Source: Temporal (21 145)  Height: 5' 5" (165 1 cm) (21 145)  Weight - Scale: 132 kg (291 lb) (21 1450)        /78 (BP Location: Right arm, Patient Position: Sitting, Cuff Size: Large)   Pulse 88   Temp (!) 97 4 °F (36 3 °C) (Temporal)   Ht 5' 5" (1 651 m)   Wt 132 kg (291 lb)   BMI 48 42 kg/m²       Lower Extremity Exam:    Vascular: Right foot DP/PT +1                   Left foot DP/PT +1                   There is +1 lower extremity edema bilateral     Musculoskeletal: There is 5/5 strength throughout the bilateral lower extremity  full ankle range of motion with diminished subtalar range of motion  There is no tenderness over the foot and ankle                 There is not foot deformities:     Neurological: Sensation to 5 07 [place]-Wolf[place] nylon filament: diminished to sensation  bilaterally      Vibratory sense to distal Foot  diminished to vibratory bilaterally      Sharp/Dull sense is diminished to sensation  bilaterally      Dermatology: Skin Condition:  decreased hair growth, hair loss and hemosiderosis     There is not evidence of macerated tissue between toe spaces  Nail Exam: onychomycosis of the toenails  x10     Open Wounds: Yes  There is a small 0 8cm lesion on the LEFT medial leg consistent with the patients history of scratching himself with his fingernails when putting on Compression socks  There are multiple other lesions similar to this with dry well adhered eshcars which the patient explains is from the same etiology  There are no signs of infection (erythema, swelling, calor, or drainage)        Calluses: No

## 2021-07-22 ENCOUNTER — TELEPHONE (OUTPATIENT)
Dept: SLEEP CENTER | Facility: CLINIC | Age: 61
End: 2021-07-22

## 2021-07-22 NOTE — TELEPHONE ENCOUNTER
Patient returned my call, asking if his machine is recalled    I advised he will need to contact Linda, phone # provided

## 2021-07-22 NOTE — TELEPHONE ENCOUNTER
Patient left message, he has questions for the staff      Attempted to contact patient, left message to call office    patient last seen 11/2017

## 2021-07-23 ENCOUNTER — TELEPHONE (OUTPATIENT)
Dept: INTERNAL MEDICINE CLINIC | Facility: CLINIC | Age: 61
End: 2021-07-23

## 2021-07-23 NOTE — TELEPHONE ENCOUNTER
Teresa Joshi from [employer] wants clarification on whether or not the compression stockings are to be knee high or thigh high    Teresa Joshi states that we can call, write instructions on original script or prepare a new script    Phone #[phone]  Fax # [phone] prepared by Dr Leif Griffith (podiatry)

## 2021-07-27 DIAGNOSIS — I87.2 VENOUS INSUFFICIENCY: Primary | ICD-10-CM

## 2021-07-27 NOTE — TELEPHONE ENCOUNTER
Encounter printed and placed in [facility] specialty folder for script clarification

## 2021-08-04 DIAGNOSIS — I10 BENIGN ESSENTIAL HYPERTENSION: ICD-10-CM

## 2021-08-04 DIAGNOSIS — E11.9 TYPE 2 DIABETES MELLITUS WITHOUT COMPLICATION, WITHOUT LONG-TERM CURRENT USE OF INSULIN (HCC): ICD-10-CM

## 2021-08-05 RX ORDER — EMPAGLIFLOZIN 10 MG/1
TABLET, FILM COATED ORAL
Qty: 90 TABLET | Refills: 0 | Status: SHIPPED | OUTPATIENT
Start: 2021-08-05 | End: 2021-11-05

## 2021-08-05 RX ORDER — LISINOPRIL 40 MG/1
TABLET ORAL
Qty: 90 TABLET | Refills: 1 | Status: SHIPPED | OUTPATIENT
Start: 2021-08-05 | End: 2021-11-26

## 2021-08-17 ENCOUNTER — APPOINTMENT (OUTPATIENT)
Dept: LAB | Facility: HOSPITAL | Age: 61
End: 2021-08-17
Payer: COMMERCIAL

## 2021-08-17 DIAGNOSIS — E11.9 TYPE 2 DIABETES MELLITUS WITHOUT COMPLICATION, WITHOUT LONG-TERM CURRENT USE OF INSULIN (HCC): ICD-10-CM

## 2021-08-17 LAB
ALBUMIN SERPL BCP-MCNC: 3.5 G/DL (ref 3.5–5)
ALP SERPL-CCNC: 92 U/L (ref 46–116)
ALT SERPL W P-5'-P-CCNC: 40 U/L (ref 12–78)
ANION GAP SERPL CALCULATED.3IONS-SCNC: 6 MMOL/L (ref 4–13)
AST SERPL W P-5'-P-CCNC: 22 U/L (ref 5–45)
BILIRUB SERPL-MCNC: 0.28 MG/DL (ref 0.2–1)
BUN SERPL-MCNC: 18 MG/DL (ref 5–25)
CALCIUM SERPL-MCNC: 8.5 MG/DL (ref 8.3–10.1)
CHLORIDE SERPL-SCNC: 104 MMOL/L (ref 100–108)
CO2 SERPL-SCNC: 30 MMOL/L (ref 21–32)
CREAT SERPL-MCNC: 0.71 MG/DL (ref 0.6–1.3)
EST. AVERAGE GLUCOSE BLD GHB EST-MCNC: 146 MG/DL
GFR SERPL CREATININE-BSD FRML MDRD: 102 ML/MIN/1.73SQ M
GLUCOSE P FAST SERPL-MCNC: 109 MG/DL (ref 65–99)
HBA1C MFR BLD: 6.7 %
POTASSIUM SERPL-SCNC: 3.9 MMOL/L (ref 3.5–5.3)
PROT SERPL-MCNC: 8 G/DL (ref 6.4–8.2)
SODIUM SERPL-SCNC: 140 MMOL/L (ref 136–145)

## 2021-08-17 PROCEDURE — 36415 COLL VENOUS BLD VENIPUNCTURE: CPT

## 2021-08-17 PROCEDURE — 83036 HEMOGLOBIN GLYCOSYLATED A1C: CPT

## 2021-08-17 PROCEDURE — 80053 COMPREHEN METABOLIC PANEL: CPT

## 2021-08-18 ENCOUNTER — TELEPHONE (OUTPATIENT)
Dept: INTERNAL MEDICINE CLINIC | Facility: CLINIC | Age: 61
End: 2021-08-18

## 2021-09-23 ENCOUNTER — OFFICE VISIT (OUTPATIENT)
Dept: INTERNAL MEDICINE CLINIC | Facility: CLINIC | Age: 61
End: 2021-09-23

## 2021-09-23 VITALS
HEIGHT: 65 IN | WEIGHT: 289 LBS | TEMPERATURE: 98.2 F | BODY MASS INDEX: 48.15 KG/M2 | HEART RATE: 77 BPM | DIASTOLIC BLOOD PRESSURE: 80 MMHG | OXYGEN SATURATION: 98 % | SYSTOLIC BLOOD PRESSURE: 133 MMHG

## 2021-09-23 DIAGNOSIS — G47.33 OBSTRUCTIVE SLEEP APNEA: ICD-10-CM

## 2021-09-23 DIAGNOSIS — Z00.00 ENCOUNTER FOR ANNUAL HEALTH EXAMINATION: ICD-10-CM

## 2021-09-23 DIAGNOSIS — M25.561 BILATERAL CHRONIC KNEE PAIN: ICD-10-CM

## 2021-09-23 DIAGNOSIS — G89.29 CHRONIC BILATERAL LOW BACK PAIN WITHOUT SCIATICA: ICD-10-CM

## 2021-09-23 DIAGNOSIS — I10 BENIGN ESSENTIAL HYPERTENSION: ICD-10-CM

## 2021-09-23 DIAGNOSIS — G89.29 BILATERAL CHRONIC KNEE PAIN: ICD-10-CM

## 2021-09-23 DIAGNOSIS — M25.562 BILATERAL CHRONIC KNEE PAIN: ICD-10-CM

## 2021-09-23 DIAGNOSIS — E78.00 HYPERCHOLESTEROLEMIA: ICD-10-CM

## 2021-09-23 DIAGNOSIS — E66.01 CLASS 3 SEVERE OBESITY DUE TO EXCESS CALORIES WITH SERIOUS COMORBIDITY AND BODY MASS INDEX (BMI) OF 45.0 TO 49.9 IN ADULT (HCC): Chronic | ICD-10-CM

## 2021-09-23 DIAGNOSIS — E11.9 TYPE 2 DIABETES MELLITUS WITHOUT COMPLICATION, WITHOUT LONG-TERM CURRENT USE OF INSULIN (HCC): Primary | ICD-10-CM

## 2021-09-23 DIAGNOSIS — M54.50 CHRONIC BILATERAL LOW BACK PAIN WITHOUT SCIATICA: ICD-10-CM

## 2021-09-23 PROBLEM — H93.19 TINNITUS: Status: RESOLVED | Noted: 2019-10-31 | Resolved: 2021-09-23

## 2021-09-23 PROCEDURE — 99396 PREV VISIT EST AGE 40-64: CPT | Performed by: PHYSICIAN ASSISTANT

## 2021-09-23 NOTE — PATIENT INSTRUCTIONS
On your visit today we discussed your DM is significantly improved  No change medications keep up the good work  Blood pressure also remains well controlled no adjustments needed to your medications  Script provided for your follow-up labs that are due as dated in 6 months  We also discussed that you did follow-up with your cardiologist at [address] [place] [address] this was a few months ago you were told everything was good and to follow-up in 1 year or as needed  You did share however that your cardiologist was hoping that you had lost weight or at least followed up with weight management and possibly surgery  While we discussed that at this time you are not interested in pursuing weight loss surgery I do agree that referral to weight management to assist you in healthy meaningful weight loss is very appropriate  You report you do have transportation at this time and referral provided today  your diabetic foot and eye exams are currently up-to-date  We did discuss however that you are reporting ongoing bilateral knee pain and low back pain  While we did discuss that I agree some of the pain is related to your weight this is not the only cause and you have had this pain for over 1 year  We also reviewed how this does limit your physical activity to assist you with your weight loss  Please get the x-rays completed and we will contact you with your results  I have reprinted the orders for the ultrasound for your fatty liver evaluation and referral to GI for colon cancer screening as you report you did reach out but were unable to get anyone to call you back  Our referral specialist will assist you in getting these appointments scheduled  You report you received both COVID vaccines will bring your vaccine card to follow-up appointment      You do however declined flu vaccine today reporting you had a  severe reaction previously and not agreeable to receive this at this time

## 2021-09-23 NOTE — PROGRESS NOTES
Assessment/Plan: On your visit today we discussed your DM is significantly improved  No change medications keep up the good work  Blood pressure also remains well controlled no adjustments needed to your medications  Script provided for your follow-up labs that are due as dated in 6 months  We also discussed that you did follow-up with your cardiologist at [address] [place] [address] this was a few months ago you were told everything was good and to follow-up in 1 year or as needed  You did share however that your cardiologist was hoping that you had lost weight or at least followed up with weight management and possibly surgery  While we discussed that at this time you are not interested in pursuing weight loss surgery I do agree that referral to weight management to assist you in healthy meaningful weight loss is very appropriate  You report you do have transportation at this time and referral provided today  your diabetic foot and eye exams are currently up-to-date  We did discuss however that you are reporting ongoing bilateral knee pain and low back pain  While we did discuss that I agree some of the pain is related to your weight this is not the only cause and you have had this pain for over 1 year  We also reviewed how this does limit your physical activity to assist you with your weight loss  Please get the x-rays completed and we will contact you with your results  I have reprinted the orders for the ultrasound for your fatty liver evaluation and referral to GI for colon cancer screening as you report you did reach out but were unable to get anyone to call you back  Our referral specialist will assist you in getting these appointments scheduled  You report you received both COVID vaccines will bring your vaccine card to follow-up appointment      You do however declined flu vaccine today reporting you had a  severe reaction previously and not agreeable to receive this at this time  No problem-specific Assessment & Plan notes found for this encounter  Diagnoses and all orders for this visit:    Type 2 diabetes mellitus without complication, without long-term current use of insulin (HCC)  -     Comprehensive metabolic panel; Future  -     Hemoglobin A1C; Future  -     Microalbumin / creatinine urine ratio; Future    Benign essential hypertension    Hypercholesterolemia  -     Lipid Panel with Direct LDL reflex; Future    Class 3 severe obesity due to excess calories with serious comorbidity and body mass index (BMI) of 45 0 to 49 9 in [facility])  -     Ambulatory referral to Weight Management; Future    Obstructive sleep apnea    Bilateral chronic knee pain  -     XR knee 3 vw right non injury; Future  -     XR knee 3 vw left non injury; Future    Chronic bilateral low back pain without sciatica  -     XR spine lumbar minimum 4 views non injury; Future    Encounter for annual health examination          Subjective:      Patient ID: Jaden Mccarthy  is a 62 y o  male  Patient presents today for follow-up of chronic medical conditions and lab review for his diabetes  Patient did have his labs completed prior to today's visit and was notified that his A1c had significantly improved  It was 8 5% now 6 7%  Patient states has not been drinking the juices and drinking the sugar free pierce  Patient had his diabetic eye exam completed June 2021 report scanned into his record and reports no diabetic retinopathy  States called to get the liver U/S and no one called him back  States called to get appt for GI for colonoscopy, states a man told him they were not being scheduled and to call back? ?      Patient reports that he did have his COVID vaccine and will bring his vaccination card to his next appointment  Patient however is declining flu vaccine    He states the last time he got the flu vaccine 2 days later he felt like he was "going to die" and does not wish to ever feel that again  States has been trying to walk more for weight loss  Admits some limitation is back and knee pain but aware weight contributing  Patient reports he followed up with the cardiologist at [facility] 3 months ago was advised to follow-up in 1 year or as needed  He does report however that the cardiologist was concerned because he did not go through with weight management or weight loss surgery  As discussed previously patient is not really interested in surgery but states he now has transportation and agreeable to referral to weight management  The following portions of the patient's history were reviewed and updated as appropriate: allergies, current medications, past family history, past medical history, past social history, past surgical history and problem list     Review of Systems   Constitutional: Negative  Negative for appetite change, diaphoresis and unexpected weight change  Eyes: Negative for visual disturbance  Respiratory: Negative  Negative for cough and shortness of breath  Cardiovascular: Negative  Negative for chest pain  Gastrointestinal: Negative  Negative for abdominal distention and abdominal pain  Endocrine: Negative  Negative for polydipsia, polyphagia and polyuria  Musculoskeletal: Positive for arthralgias, back pain and myalgias  Skin: Negative  Neurological: Negative  Negative for dizziness, light-headedness and headaches  Psychiatric/Behavioral: Negative  Objective:      /80 (BP Location: Right arm, Patient Position: Sitting, Cuff Size: Large)   Pulse 77   Temp 98 2 °F (36 8 °C) (Temporal)   Ht 5' 5" (1 651 m)   Wt 131 kg (289 lb)   SpO2 98%   BMI 48 09 kg/m²          Physical Exam  Vitals and nursing note reviewed  Constitutional:       Appearance: He is obese  HENT:      Head: Normocephalic  Neck:      Vascular: No carotid bruit     Cardiovascular:      Rate and Rhythm: Normal rate and regular rhythm  Heart sounds: Normal heart sounds  Pulmonary:      Effort: Pulmonary effort is normal       Breath sounds: Normal breath sounds  Abdominal:      General: Bowel sounds are normal    Musculoskeletal:      Cervical back: Neck supple  Lumbar back: No bony tenderness  Decreased range of motion  Positive right straight leg raise test  Negative left straight leg raise test       Right knee: No swelling  Decreased range of motion  Tenderness present over the MCL and LCL  Left knee: No swelling  Decreased range of motion  Tenderness present over the MCL and LCL  Neurological:      Mental Status: He is alert  Mental status is at baseline  Psychiatric:         Mood and Affect: Mood normal          BMI Counseling: Body mass index is 48 09 kg/m²  The BMI is above normal  Patient referred to weight management due to patient being severely obese

## 2021-09-28 ENCOUNTER — HOSPITAL ENCOUNTER (OUTPATIENT)
Dept: RADIOLOGY | Facility: HOSPITAL | Age: 61
Discharge: HOME | End: 2021-09-28
Payer: COMMERCIAL

## 2021-09-28 DIAGNOSIS — M25.561 BILATERAL CHRONIC KNEE PAIN: ICD-10-CM

## 2021-09-28 DIAGNOSIS — M54.50 CHRONIC BILATERAL LOW BACK PAIN WITHOUT SCIATICA: ICD-10-CM

## 2021-09-28 DIAGNOSIS — M25.562 BILATERAL CHRONIC KNEE PAIN: ICD-10-CM

## 2021-09-28 DIAGNOSIS — G89.29 BILATERAL CHRONIC KNEE PAIN: ICD-10-CM

## 2021-09-28 DIAGNOSIS — G89.29 CHRONIC BILATERAL LOW BACK PAIN WITHOUT SCIATICA: ICD-10-CM

## 2021-09-28 PROCEDURE — 73562 X-RAY EXAM OF KNEE 3: CPT

## 2021-09-28 PROCEDURE — 72110 X-RAY EXAM L-2 SPINE 4/>VWS: CPT

## 2021-10-04 ENCOUNTER — TELEPHONE (OUTPATIENT)
Dept: INTERNAL MEDICINE CLINIC | Facility: CLINIC | Age: 61
End: 2021-10-04

## 2021-10-06 DIAGNOSIS — E11.9 TYPE 2 DIABETES MELLITUS WITHOUT COMPLICATION, WITHOUT LONG-TERM CURRENT USE OF INSULIN (HCC): ICD-10-CM

## 2021-10-06 DIAGNOSIS — G47.61 PERIODIC LIMB MOVEMENT SLEEP DISORDER: ICD-10-CM

## 2021-10-06 PROBLEM — M17.0 ARTHRITIS OF BOTH KNEES: Status: ACTIVE | Noted: 2021-10-06

## 2021-10-06 RX ORDER — GABAPENTIN 300 MG/1
CAPSULE ORAL
Qty: 90 CAPSULE | Refills: 2 | Status: SHIPPED | OUTPATIENT
Start: 2021-10-06 | End: 2021-12-13

## 2021-10-25 ENCOUNTER — VBI (OUTPATIENT)
Dept: ADMINISTRATIVE | Facility: OTHER | Age: 61
End: 2021-10-25

## 2021-11-05 DIAGNOSIS — E11.9 TYPE 2 DIABETES MELLITUS WITHOUT COMPLICATION, WITHOUT LONG-TERM CURRENT USE OF INSULIN (HCC): ICD-10-CM

## 2021-11-05 DIAGNOSIS — E11.21 CONTROLLED TYPE 2 DIABETES MELLITUS WITH DIABETIC NEPHROPATHY, WITHOUT LONG-TERM CURRENT USE OF INSULIN (HCC): ICD-10-CM

## 2021-11-05 DIAGNOSIS — G47.61 PERIODIC LIMB MOVEMENTS OF SLEEP: ICD-10-CM

## 2021-11-05 RX ORDER — PRAMIPEXOLE DIHYDROCHLORIDE 0.12 MG/1
TABLET ORAL
Qty: 30 TABLET | Refills: 5 | Status: SHIPPED | OUTPATIENT
Start: 2021-11-05 | End: 2022-04-18

## 2021-11-05 RX ORDER — EMPAGLIFLOZIN 10 MG/1
TABLET, FILM COATED ORAL
Qty: 90 TABLET | Refills: 0 | Status: SHIPPED | OUTPATIENT
Start: 2021-11-05 | End: 2021-11-26

## 2021-11-05 RX ORDER — ATORVASTATIN CALCIUM 40 MG/1
TABLET, FILM COATED ORAL
Qty: 90 TABLET | Refills: 1 | Status: SHIPPED | OUTPATIENT
Start: 2021-11-05 | End: 2022-03-28

## 2021-11-12 ENCOUNTER — VBI (OUTPATIENT)
Dept: ADMINISTRATIVE | Facility: OTHER | Age: 61
End: 2021-11-12

## 2021-12-11 DIAGNOSIS — E11.9 TYPE 2 DIABETES MELLITUS WITHOUT COMPLICATION, WITHOUT LONG-TERM CURRENT USE OF INSULIN (HCC): ICD-10-CM

## 2021-12-11 DIAGNOSIS — G47.61 PERIODIC LIMB MOVEMENT SLEEP DISORDER: ICD-10-CM

## 2021-12-13 RX ORDER — GABAPENTIN 300 MG/1
CAPSULE ORAL
Qty: 90 CAPSULE | Refills: 2 | Status: SHIPPED | OUTPATIENT
Start: 2021-12-13 | End: 2022-03-28

## 2022-01-14 NOTE — PATIENT INSTRUCTIONS
Offered ambulance  Patientdeclined transport by ambulance, states can drive self  Alert, oriented, capable of making  own medical decisions and understands the risks of refusing ambulance transfer  he does agree to go to the ED at [facility] for further evaluation and treatment  Melolabial Interpolation Flap Text: A decision was made to reconstruct the defect utilizing an interpolation axial flap and a staged reconstruction.  A telfa template was made of the defect.  This telfa template was then used to outline the melolabial interpolation flap.  The donor area for the pedicle flap was then injected with anesthesia.  The flap was excised through the skin and subcutaneous tissue down to the layer of the underlying musculature.  The pedicle flap was carefully excised within this deep plane to maintain its blood supply.  The edges of the donor site were undermined.   The donor site was closed in a primary fashion.  The pedicle was then rotated into position and sutured.  Once the tube was sutured into place, adequate blood supply was confirmed with blanching and refill.  The pedicle was then wrapped with xeroform gauze and dressed appropriately with a telfa and gauze bandage to ensure continued blood supply and protect the attached pedicle.

## 2022-01-21 ENCOUNTER — APPOINTMENT (OUTPATIENT)
Dept: LAB | Facility: HOSPITAL | Age: 62
End: 2022-01-21
Payer: MEDICARE

## 2022-01-21 DIAGNOSIS — M79.89 LEG SWELLING: ICD-10-CM

## 2022-01-21 LAB
ANION GAP SERPL CALCULATED.3IONS-SCNC: 5 MMOL/L (ref 4–13)
BUN SERPL-MCNC: 17 MG/DL (ref 5–25)
CALCIUM SERPL-MCNC: 9.2 MG/DL (ref 8.3–10.1)
CHLORIDE SERPL-SCNC: 105 MMOL/L (ref 100–108)
CO2 SERPL-SCNC: 29 MMOL/L (ref 21–32)
CREAT SERPL-MCNC: 0.86 MG/DL (ref 0.6–1.3)
GFR SERPL CREATININE-BSD FRML MDRD: 94 ML/MIN/1.73SQ M
GLUCOSE SERPL-MCNC: 148 MG/DL (ref 65–140)
POTASSIUM SERPL-SCNC: 3.7 MMOL/L (ref 3.5–5.3)
SODIUM SERPL-SCNC: 139 MMOL/L (ref 136–145)

## 2022-01-21 PROCEDURE — 36415 COLL VENOUS BLD VENIPUNCTURE: CPT

## 2022-01-21 PROCEDURE — 80048 BASIC METABOLIC PNL TOTAL CA: CPT

## 2022-01-27 ENCOUNTER — TELEPHONE (OUTPATIENT)
Dept: INTERNAL MEDICINE CLINIC | Facility: CLINIC | Age: 62
End: 2022-01-27

## 2022-01-27 ENCOUNTER — APPOINTMENT (OUTPATIENT)
Dept: LAB | Facility: HOSPITAL | Age: 62
End: 2022-01-27
Attending: INTERNAL MEDICINE
Payer: MEDICARE

## 2022-01-27 DIAGNOSIS — Z20.1 EXPOSURE TO TB: Primary | ICD-10-CM

## 2022-01-27 DIAGNOSIS — Z20.1 EXPOSURE TO TB: ICD-10-CM

## 2022-01-27 PROCEDURE — 86480 TB TEST CELL IMMUN MEASURE: CPT

## 2022-01-27 PROCEDURE — 36415 COLL VENOUS BLD VENIPUNCTURE: CPT

## 2022-01-27 NOTE — TELEPHONE ENCOUNTER
Patient called in stating that his sister was taken by ambulance yesterday for suspected TB  He states it is not confirmed just suspected  They advised him that they will not have her test results for a couple of days  Patient was with her about 3 weeks ago but does not know when her symptoms started  He states he was unable to speak with her yesterday but the time he got there she was already in the ambulance  Patient states he is feeling okay but wants to know what he should do if she does have TB since he was exposed  Please advise      Patient phone: [phone]

## 2022-01-27 NOTE — TELEPHONE ENCOUNTER
Please let patient know blood test for TB has been ordered  He can go to any [facility] lab to get it done

## 2022-01-27 NOTE — TELEPHONE ENCOUNTER
Called and spoke with the patient- he is aware of the information as noted  He would like to get the blood work completed today as soon as possible  Patient would like to go to the lab now to complete   Thank you

## 2022-01-28 LAB
GAMMA INTERFERON BACKGROUND BLD IA-ACNC: 0.03 IU/ML
M TB IFN-G BLD-IMP: NEGATIVE
M TB IFN-G CD4+ BCKGRND COR BLD-ACNC: 0 IU/ML
M TB IFN-G CD4+ BCKGRND COR BLD-ACNC: 0 IU/ML
MITOGEN IGNF BCKGRD COR BLD-ACNC: 9.15 IU/ML

## 2022-04-17 DIAGNOSIS — G47.61 PERIODIC LIMB MOVEMENTS OF SLEEP: ICD-10-CM

## 2022-04-18 RX ORDER — PRAMIPEXOLE DIHYDROCHLORIDE 0.12 MG/1
TABLET ORAL
Qty: 30 TABLET | Refills: 5 | Status: SHIPPED | OUTPATIENT
Start: 2022-04-18

## 2022-05-10 ENCOUNTER — TELEPHONE (OUTPATIENT)
Dept: INTERNAL MEDICINE CLINIC | Facility: CLINIC | Age: 62
End: 2022-05-10

## 2022-05-10 NOTE — TELEPHONE ENCOUNTER
Please prepare an updated ambulatory referral to podiatry    Patient has appointment with podiatry 5/16/22      thanks

## 2022-05-11 DIAGNOSIS — I87.2 VENOUS INSUFFICIENCY: ICD-10-CM

## 2022-05-11 DIAGNOSIS — E11.42 DIABETIC POLYNEUROPATHY ASSOCIATED WITH TYPE 2 DIABETES MELLITUS (HCC): Primary | ICD-10-CM

## 2022-05-11 DIAGNOSIS — E11.9 TYPE 2 DIABETES MELLITUS WITHOUT COMPLICATION, WITHOUT LONG-TERM CURRENT USE OF INSULIN (HCC): ICD-10-CM

## 2022-05-16 ENCOUNTER — OFFICE VISIT (OUTPATIENT)
Dept: MULTI SPECIALTY CLINIC | Facility: CLINIC | Age: 62
End: 2022-05-16

## 2022-05-16 VITALS
BODY MASS INDEX: 48.32 KG/M2 | HEART RATE: 89 BPM | DIASTOLIC BLOOD PRESSURE: 87 MMHG | SYSTOLIC BLOOD PRESSURE: 153 MMHG | TEMPERATURE: 98.1 F | WEIGHT: 290 LBS | HEIGHT: 65 IN

## 2022-05-16 DIAGNOSIS — I87.2 VENOUS INSUFFICIENCY: ICD-10-CM

## 2022-05-16 DIAGNOSIS — G63 POLYNEUROPATHY ASSOCIATED WITH UNDERLYING DISEASE (HCC): ICD-10-CM

## 2022-05-16 DIAGNOSIS — E11.9 TYPE 2 DIABETES MELLITUS WITHOUT COMPLICATION, WITHOUT LONG-TERM CURRENT USE OF INSULIN (HCC): Primary | ICD-10-CM

## 2022-05-16 DIAGNOSIS — B35.1 ONYCHOMYCOSIS: ICD-10-CM

## 2022-05-16 PROCEDURE — 99213 OFFICE O/P EST LOW 20 MIN: CPT | Performed by: PODIATRIST

## 2022-05-16 NOTE — PROGRESS NOTES
At [facility]  65 y o  male MRN: [id number]  Encounter: [id number]      Assessment/Plan        Diagnoses and all orders for this visit:    Type 2 diabetes mellitus without complication, without long-term current use of insulin ([facility])    Polyneuropathy associated with underlying disease ([facility] )    Venous insufficiency    Onychomycosis           Plan:   Patient was seen/examined  All questions and concerns addressed   Nails x10 were sharply trimmed to normal length and thickness with a large nail nipper without incident (CPT 46730)   Educated patient on proper diabetic foot care; checking feet every day, wearing white socks, always wearing diabetic shoes even in house, tight glycemic control, proper low-sugar diet and exercise   Continue daily use of bilateral compression stockings 20-30mmHg   RTC in 4 month(s)    Dr Alexys Isaacs was present during this entire procedure  History of Present Illness     HPI:  Bethany Bruno  is a 65 y o  male who presents with elongated toenails and routine diabetic foot exam  States that their nails are painful, elongated  They have difficulty applying their socks and shoes  The pressure within their shoe gear is painful and they have been unable to cut their nails adequately  Patient admits to numbness/tingling  They state their last HbA1c was 6 7%  The patient denies any nausea, vomiting, fever, chills, shortness of breath, or chest pains  Review of Systems   Constitutional: Negative  HENT: Negative  Eyes: Negative  Respiratory: Negative  Cardiovascular: Negative  Gastrointestinal: Negative  Musculoskeletal: Negative   Skin: elongated thickened toenails     Neurological: Diminished sensation bilateral feet      Historical Information   Past Medical History:   Diagnosis Date    COVID-19 virus infection 1/7/2021    Diabetes mellitus ([facility] )     History of gastroesophageal reflux (GERD)     Hypertension     Last assessed: 10/24/13    Sleep apnea     Tinnitus 10/31/2019     Past Surgical History:   Procedure Laterality Date    AMPUTATION      of Finger, with Neurectomy and flaps     Social History   Social History     Substance and Sexual Activity   Alcohol Use Never     Social History     Substance and Sexual Activity   Drug Use Never     Social History     Tobacco Use   Smoking Status Former Smoker    Quit date:     Years since quittin 3   Smokeless Tobacco Never Used     Family History:   Family History   Problem Relation Age of Onset    Uterine cancer Mother     Diabetes Father     Vascular Disease Father         peripheral    Bone cancer Sister     Other Brother         severe head trauma       Meds/Allergies   (Not in a hospital admission)    No Known Allergies    Objective     Current Vitals:   Blood Pressure: 153/87 (22 1258)  Pulse: 89 (22 1258)  Temperature: 98 1 °F (36 7 °C) (22 1258)  Temp Source: Temporal (22 1258)  Height: 5' 5" (165 1 cm) (22 1258)  Weight - Scale: 132 kg (290 lb) (22 1258)        /87 (BP Location: Left arm, Patient Position: Sitting, Cuff Size: Large)   Pulse 89   Temp 98 1 °F (36 7 °C) (Temporal)   Ht 5' 5" (1 651 m)   Wt 132 kg (290 lb)   BMI 48 26 kg/m²       Lower Extremity Exam:    Vascular: Right foot DP/PT +1                   Left foot DP/PT +1                   There is +1 lower extremity edema bilateral foot and ankle  Musculoskeletal: There is 5/5 strength throughout the bilateral lower extremity  full ankle range of motion with diminished subtalar range of motion  There is no tenderness over the foot and ankle  There is not foot deformities      Neurological: Sensation to 5 07 [place]-Wolfgang nylon filament: positive bilaterally      Vibratory sense to distal Foot  positive bilaterally      Sharp/Dull sense is positive bilaterally      Dermatology: Skin Condition:  normal     There is not evidence of macerated tissue between toe spaces  Nail Exam: onychomycosis of the toenails       Open ulcerations: No     Calluses: No    Risk Category: 0 = No loss of protective sensation

## 2022-07-11 ENCOUNTER — TELEPHONE (OUTPATIENT)
Dept: INTERNAL MEDICINE CLINIC | Facility: CLINIC | Age: 62
End: 2022-07-11

## 2022-07-11 DIAGNOSIS — E11.42 DIABETIC POLYNEUROPATHY ASSOCIATED WITH TYPE 2 DIABETES MELLITUS (HCC): Primary | ICD-10-CM

## 2022-07-11 NOTE — TELEPHONE ENCOUNTER
Patient made an appt with [address] to get fitted for his annual diabetic shoe but stated he need a referral from his physician to be faxed over to them asap, today   He would like to receive a call letting him know it has been faxed and a confirmation has been received

## 2022-09-01 ENCOUNTER — TELEPHONE (OUTPATIENT)
Dept: INTERNAL MEDICINE CLINIC | Facility: CLINIC | Age: 62
End: 2022-09-01

## 2022-09-01 NOTE — TELEPHONE ENCOUNTER
Folder Color- Blue    Name of [address] [place] [address]    Form to be filled out by- Dr Tru Stockton    Form to be Faxed [phone]

## 2022-09-19 ENCOUNTER — TELEPHONE (OUTPATIENT)
Dept: INTERNAL MEDICINE CLINIC | Facility: CLINIC | Age: 62
End: 2022-09-19

## 2022-09-19 NOTE — TELEPHONE ENCOUNTER
Prior auth started will follow up in 2-3 days  Clerical, please contact patient to schedule appt as he has not been seen for a year

## 2022-09-19 NOTE — TELEPHONE ENCOUNTER
Patient called to check on his Jardiance  He has been out of it for two weeks  It looks like it needs a prior authorization

## 2022-09-21 ENCOUNTER — CLINICAL SUPPORT (OUTPATIENT)
Dept: INTERNAL MEDICINE CLINIC | Facility: CLINIC | Age: 62
End: 2022-09-21

## 2022-09-21 DIAGNOSIS — Z11.52 ENCOUNTER FOR SCREENING FOR COVID-19: Primary | ICD-10-CM

## 2022-09-21 LAB
SARS-COV-2 AG UPPER RESP QL IA: NEGATIVE
VALID CONTROL: NORMAL

## 2022-09-21 PROCEDURE — 87811 SARS-COV-2 COVID19 W/OPTIC: CPT

## 2022-09-28 DIAGNOSIS — L85.3 XEROSIS CUTIS: ICD-10-CM

## 2022-09-29 RX ORDER — AMMONIUM LACTATE 12 G/100G
LOTION TOPICAL
Qty: 400 G | Refills: 4 | Status: SHIPPED | OUTPATIENT
Start: 2022-09-29

## 2022-10-13 ENCOUNTER — TELEPHONE (OUTPATIENT)
Dept: INTERNAL MEDICINE CLINIC | Facility: CLINIC | Age: 62
End: 2022-10-13

## 2022-10-13 NOTE — TELEPHONE ENCOUNTER
Patient called because he received a message to confirm his appointment on 10/17/22  He stated that he was unaware that this appointment was made and he wanted to reschedule  Rescheduled the patients appointment for 12/19/22 because he said he already had a podiatry appointment that day

## 2022-10-15 DIAGNOSIS — G47.61 PERIODIC LIMB MOVEMENTS OF SLEEP: ICD-10-CM

## 2022-10-17 RX ORDER — PRAMIPEXOLE DIHYDROCHLORIDE 0.12 MG/1
TABLET ORAL
Qty: 30 TABLET | Refills: 5 | Status: SHIPPED | OUTPATIENT
Start: 2022-10-17

## 2022-10-29 NOTE — TELEPHONE ENCOUNTER
Called patient back to figure out what was going on with patient  Patient just wanted me to inform Shira Rivas that [facility] called him and stated they will not give him the Cpap machine until he is first seen by the sleep center as well as obtaining a hand written script  Patient just wanted you to be aware that his appointment for sleep center is not until the end of October  Patient did inform me that he is on the cancellation list to get in sooner if someone cancels  Patient just needs you to write a hand written script for the CPap as that what [facility] is requesting  Patient would like a phone call once script is ready  Patient stated his phone has not been working properly so if we call and he doesn't answer, he would like us just to leave a message that script is ready 
LMOM WITH INFO 
Home

## 2022-11-01 ENCOUNTER — OFFICE VISIT (OUTPATIENT)
Dept: INTERNAL MEDICINE CLINIC | Facility: CLINIC | Age: 62
End: 2022-11-01

## 2022-11-01 VITALS
SYSTOLIC BLOOD PRESSURE: 122 MMHG | BODY MASS INDEX: 48.42 KG/M2 | DIASTOLIC BLOOD PRESSURE: 76 MMHG | HEART RATE: 96 BPM | TEMPERATURE: 97.4 F | WEIGHT: 291 LBS | OXYGEN SATURATION: 97 %

## 2022-11-01 DIAGNOSIS — Z11.52 ENCOUNTER FOR SCREENING FOR COVID-19: ICD-10-CM

## 2022-11-01 DIAGNOSIS — U07.1 COVID-19: Primary | ICD-10-CM

## 2022-11-01 LAB
SARS-COV-2 AG UPPER RESP QL IA: POSITIVE
VALID CONTROL: ABNORMAL

## 2022-11-01 NOTE — PROGRESS NOTES
[facility]  INTERNAL MEDICINE  [address]      NAME: Sharon Bell  AGE: 65 y o  SEX: male    DATE OF ENCOUNTER: 11/1/2022    ASSESSMENT AND PLAN     1  COVID-23 61-year-old presenting with the 3 days symptoms of cough and runny nose  Tested positive for COVID-19 in the clinic  Previously had fevers and myalgias which since then have resolved  Overall symptoms improving  Will treat patient symptomatically  Vital signs stable in clinic  - pseudoephedrine-dextromethorphan-guaifenesin (ROBITUSSIN-PE) 30- MG/5ML solution; Take 10 mL by mouth 4 (four) times a day as needed for allergies for up to 4 days  Dispense: 236 mL; Refill: 0  - POCT Rapid Covid Ag  - recommend isolation for next 2 days followed by strict mask use for the following 5 days  - return precautions provided should patient develop any symptoms of chest pain, shortness a breath    Return to clinic as scheduled in December or earlier as needed  Orders Placed This Encounter   Procedures   • POCT Rapid Covid Ag       CHIEF COMPLAINT     Chief Complaint   Patient presents with   • Cough   • Nasal Congestion       HISTORY OF PRESENT ILLNESS     Patient is a 61-year-old male presenting with acute complaint of 40 nose and productive cough  Symptoms of fevers and myalgias began 3 days ago and resolved within 1 day  Today's date 3 of symptoms and patient complains of persistent productive cough and runny nose  Denies any sore throat, fevers, nausea, vomiting, abdominal pain, constipation, diarrhea, chest pain, shortness a breath, loss of sense of smell or taste  No sick contacts  Receive COVID vaccine x3  Tested positive for COVID-19 in the clinic  Tolerating diet well      The following portions of the patient's history were reviewed and updated as appropriate: allergies, current medications, past family history, past medical history, past social history, past surgical history and problem list     REVIEW OF SYSTEMS     Review of Systems   Constitutional: Negative for chills and fever  HENT: Negative for congestion and sore throat  Runny nose   Eyes: Negative for blurred vision and pain  Cardiovascular: Negative for chest pain and dyspnea on exertion  Respiratory: Positive for cough and sputum production  Negative for shortness of breath  Musculoskeletal: Negative for arthritis and back pain  Gastrointestinal: Negative for abdominal pain, dysphagia, nausea and vomiting  Genitourinary: Negative for dysuria and flank pain  Neurological: Negative for headaches and light-headedness  Psychiatric/Behavioral: Negative for altered mental status and suicidal ideas           All other ROS negative except per HPI    ACTIVE PROBLEM LIST     Patient Active Problem List   Diagnosis   • Benign essential hypertension   • Type 2 diabetes mellitus without complication, without long-term current use of insulin ([facility] )   • Hypercholesterolemia   • Class 3 severe obesity due to excess calories with serious comorbidity and body mass index (BMI) of 45 0 to 49 9 in [facility])   • Obstructive sleep apnea   • Periodic limb movement sleep disorder   • Vitamin D deficiency   • Venous insufficiency   • Chronic bilateral low back pain   • Leg swelling   • History of COVID-19   • Diabetic polyneuropathy associated with type 2 diabetes mellitus ([facility] )   • Arthritis of both knees       Past Medical History:   Diagnosis Date   • COVID-19 virus infection 1/7/2021   • Diabetes mellitus ([place] [id number] )    • History of gastroesophageal reflux (GERD)    • Hypertension     Last assessed: 10/24/13   • Sleep apnea    • Tinnitus 10/31/2019       CURRENT MEDICATIONS       Current Outpatient Medications:   •  pseudoephedrine-dextromethorphan-guaifenesin (ROBITUSSIN-PE) 30- MG/5ML solution, Take 10 mL by mouth 4 (four) times a day as needed for allergies for up to 4 days, Disp: 236 mL, Rfl: 0  • ammonium lactate (LAC-HYDRIN) 12 % lotion, APPLY TOPICALLY TO THE AFFECTED AREA TWO TIMES A DAY, Disp: 400 g, Rfl: 4  •  atorvastatin (LIPITOR) 40 mg tablet, TAKE 1 TABLET BY MOUTH EVERY DAY, Disp: 90 tablet, Rfl: 3  •  carvedilol (COREG) 6 25 mg tablet, Take 6 25 mg by mouth, Disp: , Rfl:   •  Cholecalciferol (Vitamin D3) 50 MCG (2000 UT) capsule, TAKE 1 CAPSULE BY MOUTH EVERY DAY, Disp: 90 capsule, Rfl: 3  •  gabapentin (NEURONTIN) 300 mg capsule, TAKE 1 CAPSULE BY MOUTH THREE TIMES A DAY, Disp: 270 capsule, Rfl: 3  •  glucose blood (FREESTYLE LITE) test strip, TEST ONCE DAILY, Disp: 100 each, Rfl: 3  •  Jardiance 10 MG TABS, TAKE 1 TABLET BY MOUTH EVERY DAY IN THE MORNING, Disp: 90 tablet, Rfl: 3  •  lisinopril (ZESTRIL) 40 mg tablet, TAKE 1 TABLET BY MOUTH EVERY DAY, Disp: 90 tablet, Rfl: 3  •  metFORMIN (GLUCOPHAGE) 1000 MG tablet, TAKE 1 TABLET BY MOUTH TWICE A DAY WITH MEALS, Disp: 180 tablet, Rfl: 3  •  potassium chloride (K-DUR,KLOR-CON) 20 mEq tablet, Take 40 mEq by mouth, Disp: , Rfl:   •  pramipexole (MIRAPEX) 0 125 mg tablet, TAKE 1 TABLET BY MOUTH EVERY DAY, Disp: 30 tablet, Rfl: 5  •  torsemide (DEMADEX) 20 mg tablet, 20 mg daily , Disp: , Rfl: 1    No Known Allergies    Social History   Past Surgical History:   Procedure Laterality Date   • AMPUTATION      of Finger, with Neurectomy and flaps     Family History   Problem Relation Age of Onset   • Uterine cancer Mother    • Diabetes Father    • Vascular Disease Father         peripheral   • Bone cancer Sister    • Other Brother         severe head trauma       OBJECTIVE     /76 (BP Location: Right arm, Patient Position: Sitting, Cuff Size: Large)   Pulse 96   Temp (!) 97 4 °F (36 3 °C) (Temporal)   Wt 132 kg (291 lb)   SpO2 97%   BMI 48 42 kg/m²     Physical Exam  Vitals reviewed  Constitutional:       General: He is not in acute distress  Appearance: Normal appearance  He is not ill-appearing     HENT:      Head: Normocephalic and atraumatic  Mouth/Throat:      Mouth: Mucous membranes are moist       Pharynx: Oropharynx is clear  No oropharyngeal exudate or posterior oropharyngeal erythema  Eyes:      General: No scleral icterus  Extraocular Movements: Extraocular movements intact  Conjunctiva/sclera: Conjunctivae normal    Cardiovascular:      Rate and Rhythm: Normal rate and regular rhythm  Heart sounds: Normal heart sounds  No murmur heard  Pulmonary:      Effort: Pulmonary effort is normal  No respiratory distress  Breath sounds: Normal breath sounds  No wheezing or rales  Abdominal:      General: Bowel sounds are normal       Palpations: Abdomen is soft  Tenderness: There is no abdominal tenderness  Skin:     General: Skin is warm and dry  Neurological:      Mental Status: He is alert and oriented to person, place, and time  Mental status is at baseline  Gait: Gait normal    Psychiatric:         Mood and Affect: Mood normal          Behavior: Behavior normal          Pertinent Laboratory/Diagnostic Studies:     XR spine lumbar minimum 4 views non injury    Result Date: 10/4/2021  Impression: No acute osseous abnormality  Degenerative changes as described  Large calcified gallstone versus porcelain gallbladder  Sonographic follow-up is advised  The study was marked in EPIC for significant notification  Workstation performed: [id number]     XR knee 3 vw left non injury    Result Date: 10/4/2021  Impression: No acute osseous abnormality  Degenerative changes as described  Workstation performed: [id number]     XR knee 3 vw right non injury    Result Date: 10/4/2021  Impression: No acute osseous abnormality  Degenerative changes as described   Workstation performed: [id number]       Images and diagnostics reviewed     [address] Maintenance   Topic Date Due   • COVID-19 Vaccine (1) Never done   • Colorectal Cancer Screening  Never done   • Pneumococcal Vaccine: Pediatrics (0 to 5 Years) and At-Risk Patients (6 to 59 Years) (2 - PCV) 02/07/2020   • HEMOGLOBIN A1C  02/17/2022   • Depression Screening  05/11/2022   • Diabetic Foot Exam  05/11/2022   • Influenza Vaccine (1) Never done   • BMI: Followup Plan  09/23/2022   • Annual Physical  09/23/2022   • DM Eye Exam  06/29/2023   • DTaP,Tdap,and Td Vaccines (2 - Td or Tdap) 10/24/2023   • BMI: Adult  11/01/2023   • HIV Screening  Completed   • Hepatitis C Screening  Completed   • HIB Vaccine  Aged Out   • Hepatitis B Vaccine  Aged Out   • IPV Vaccine  Aged Out   • Hepatitis A Vaccine  Aged Out   • Meningococcal ACWY Vaccine  Aged Out   • HPV Vaccine  Aged Out     Immunization History   Administered Date(s) Administered   • Pneumococcal Polysaccharide PPV23 02/07/2019   • Tdap 10/24/2013   • Zoster Vaccine Recombinant 08/14/2020       I globally spent 15 minutes face-to-face with the patient and with chart review       Caryl Harrell, DO  Internal Medicine PGY-3

## 2022-11-01 NOTE — PATIENT INSTRUCTIONS
COVID-19 and Chronic Health Conditions   AMBULATORY CARE:   What you need to know about coronavirus disease 2019 (COVID-19) and chronic health conditions: Your chronic condition may increase your risk for COVID-19 or serious problems it can cause  Healthcare providers might need to make changes that affect how you usually manage your chronic health condition  Providers may change hours of operation or not have patients come in to be seen  You may not be able to make appointments to get blood drawn or to have tests or procedures  This may continue until the virus that causes COVID-19 is controlled  Until then, you can take steps to manage your condition  The steps will also lower your risk for COVID-19 or the serious problems it causes  If you do develop COVID-19, healthcare providers will tell you when it is okay to be around others after you recover  This depends on your chronic condition, any symptoms of COVID-19 that developed, and how severe the symptoms were  What you need to know about serious problems from the virus:  You may develop long-term health problems caused by the virus  Your risk is higher if you are 65 or older  A weak immune system, obesity, diabetes, chronic kidney disease, or a heart or lung condition can also increase your risk  Your risk is also higher if you are a current or former cigarette smoker   COVID-19 can lead to any of the following:  Multisymptom inflammatory syndrome in adults (MIS-A) or in children (MIS-C), causing inflammation in the heart, digestive system, skin, or brain    Shortness of breath, serious lower respiratory conditions, such as pneumonia or acute respiratory distress syndrome (ARDS)    Blood clots or blood vessel damage    Organ damage from a lack of oxygen or from blood clots    Sleep problems    Problems thinking clearly, remembering information, or concentrating    Mood changes, depression, or anxiety    Long-term problems tasting or smelling    Loss of appetite and weight loss    Nerve pain    Fatigue (feeling mentally and physically tired)    Call your local emergency number (911 in the [address]) if:   You have trouble breathing or shortness of breath  You have chest pain or pressure that lasts longer than 5 minutes  You become confused or hard to wake  Your lips or face are blue  Seek care immediately if:   You have a fever of 104°F (40°C) or higher  Call your doctor or healthcare provider if:   You have symptoms of COVID-19  You have questions or concerns about COVID-19 or your chronic condition  How the 2019 coronavirus spreads: The virus spreads quickly and easily  The virus can be passed starting 2 to 3 days before symptoms begin or before a positive test if symptoms never begin  Droplets are the main way all coronaviruses spread  The virus travels in droplets that form when a person talks, sings, coughs, or sneezes  The droplets can also float in the air for minutes or hours  Infection happens when you breathe in the droplets or get them in your eyes or nose  Close personal contact with an infected person increases your risk for infection  This means being within 6 feet (2 meters) of the person for at least 15 minutes over 24 hours  Person-to-person contact can spread the virus  For example, a person with the virus on his or her hands can spread it by shaking hands with someone  The virus can stay on objects and surfaces for up to 3 days  You may become infected by touching the object or surface and then touching your eyes or mouth  Manage your chronic health condition during this time:  If you do not have a regular healthcare provider, experts recommend you contact a local community health center or health department  The following can help you manage your condition and prevent COVID-19:  Get emergency care for your condition if needed    Talk to your healthcare providers about symptoms of your chronic condition that need immediate care  Your providers can help you create a plan or add exacerbation management to your plan  The plan will include when to go to an emergency department and when to call your local emergency number  This will depend on where you live and the services that are available during this time  Go to dialysis appointments as scheduled  It is important to stay on schedule  You will need to have enough food to be able to follow the emergency diet plan if you must miss a session  The emergency diet needs to be part of the management plan for your condition  Reschedule any upcoming appointments as needed  Medical facilities may be closed until the coronavirus is better controlled  This means you may need to reschedule a surgery, procedure, or check-up appointment  If you cannot have a phone or video appointment, you will need to make a new appointment  Some providers may be scheduling appointments several months in advance  Some surgeries and procedures will happen as scheduled  This depends on the medical condition and the reason for the surgery or procedure  You may need to have extra testing for COVID-19 several days before  Follow any regular management plan you use  Your healthcare provider will tell you if you need to make any changes to your regular management plan  For example, if you have asthma, continue to follow your asthma action plan  If you have diabetes, you may need to check your blood sugar level more often  Stress and illness can make blood sugar levels go up  You may need to adjust medicine such as insulin  If you have a heart condition or high blood pressure, you may need to check your blood pressure more often  Stress and illness can also raise your blood pressure  Talk to your healthcare providers about your medicines  You may be able to get more than 1 month of medicine at a time  This will lower the number of times you need to go to a pharmacy to get your medicines   Make sure you have enough medicine if you have a condition that can lead to an emergency  Examples include asthma medicines, insulin, or an epinephrine pen  Check the expiration dates on the medicines you currently have  Ask for refills as soon as possible, if needed  If it is not time to refill prescriptions, you may be able to get an emergency supply of some medicines  Medicine plans vary, so ask your healthcare provider or pharmacist for options  Have supplies available in your home  If possible, get extra supplies you use regularly  Examples include absorbent pads, syringes, and wound cleaning solutions  This will limit the number of trips out of your home to get supplies  Know the signs and symptoms of COVID-19  Signs and symptoms usually start about 5 days after infection but can take 2 to 14 days  Signs and symptoms range from mild to severe  You may feel like you have the flu or a bad cold  Your chronic health condition may cause some of the same symptoms COVID-19 causes  This can make it hard to know if a symptom is from COVID-19 or your chronic condition  Keep a record of any new or worsening symptom you have  This is especially important if you have a condition that often causes shortness of breath  Your provider can tell you if you should be tested for COVID-19   Tell your healthcare provider if you think you were infected but develop signs or symptoms not listed below:    A cough    Shortness of breath or trouble breathing that may become severe    A fever of at least 100 4°F, or 38°C (may be lower in adults 65 or older)    Chills that might include shaking    Muscle pain, body aches, or a headache    A sore throat    Suddenly not being able to taste or smell anything    Feeling very tired (fatigue)    Congestion (stuffy head and nose), or a runny nose    Diarrhea, nausea, or vomiting    What you can do to prevent having to go out of your home during this time:   Ask your healthcare provider for other ways to have appointments  Some providers offer phone, video, or other types of appointments  Have food, medicines, and other supplies delivered  Some pharmacies can send certain medicines to you through the mail  Grocery stores and restaurants may be able to deliver food and other items  If possible, have delivered items placed somewhere  Try not to have someone hand you an item  You will be so close to the person that the virus can spread between you  Ask someone to get items you need  The person can get groceries, medicines, or other needed items for you  Choose a person who does not have signs or symptoms of COVID-19 or has tested negative for it  The person should not be waiting for test results  He or she should not have a condition that increases the risk for COVID-19 or serious problems it causes  What you need to know about COVID-19 vaccines: Your healthcare provider can give you more information about what to expect, depending on your chronic condition  You are considered fully vaccinated against COVID-19 two weeks after the final dose of any COVID-19 vaccine  Let your healthcare provider know when you have received the final dose of the vaccine  Make a copy of your vaccination card  Keep the original with you in case you need to show it  Keep the copy in a safe place  COVID-19 vaccines are given as a shot in 1 or 2 doses  Vaccination is recommended for everyone 5 years or older  One 2-dose vaccine is fully approved  for those 16 years or older  This vaccine also has an emergency use authorization (EUA) for children 11to 13years old  No vaccine is currently available for children younger than 5 years  A booster (additional) dose  is given to help the immune system continue to protect against severe COVID-19  A booster is recommended for all adults 18 or older  The booster can be a different brand of the COVID-19 vaccine than you originally received   The timing for the booster depends on the type of vaccine you received:    1-dose vaccine: The booster is given at least 2 months after you received the vaccine  2-dose vaccine: The booster is given at least 5 or 6 months after the second dose  A booster can be given to adolescents 15to 16years old  Only 1 COVID-19 vaccine has this EUA  The booster is given at least 5 months after the second dose of the original vaccine series  A booster is recommended for immunocompromised children 11to 6years old  Only 1 COVID-19 vaccine has this EUA  The booster is given 28 days after the second dose  Continue social distancing and other measures, even after you get the vaccine  Although it is not common, you can become infected after you get the vaccine  You may also be able to pass the virus to others without knowing you are infected  After you get the vaccine, check local, national, and international travel rules  You may need to be tested before you travel  Some countries require proof of a negative test before you travel  You may also need to quarantine after you return  Medicine may be given to prevent infection  The medicine can be given if you are at high risk for infection and cannot get the vaccine  It can also be given if your immune system does not respond well to the vaccine  Lower your risk for COVID-19:   Wash your hands often throughout the day  Use soap and water  Rub your soapy hands together, lacing your fingers, for at least 20 seconds  Rinse with warm, running water  Dry your hands with a clean towel or paper towel  Use hand  that contains alcohol if soap and water are not available  Teach children how to wash their hands and use hand   Cover sneezes and coughs  Turn your face away and cover your mouth and nose with a tissue  Throw the tissue away  Use the bend of your arm if a tissue is not available  Then wash your hands with soap and water or use hand    Teach children how to cover a cough or sneeze  Follow worldwide, national, and local social distancing guidelines  Keep at least 6 feet (2 meters) between you and others  Wear a face covering (mask) around anyone who does not live in your home  Use a cloth covering with at least 2 layers  You can also create layers by putting a cloth covering over a disposable non-medical mask  Cover your mouth and your nose  Try not to touch your face  If you get the virus on your hands, you can transfer it to your eyes, nose, or mouth and become infected  You can also transfer it to objects, surfaces, or people  Clean and disinfect high-touch surfaces and objects in your home often  Use disinfecting wipes, or make a solution by mixing 4 teaspoons of bleach with 1 quart (4 cups) of water  Do not  use any cleaning or disinfecting products that can trigger an asthma attack or other breathing problems  Open windows or have circulating air as you clean  Do not  mix ammonia with bleach  This will create toxic fumes  How to follow social distancing guidelines:  National and local social distancing rules vary  Rules and restrictions may change over time as restrictions are lifted  The following are general guidelines:  Stay home if you are sick or think you may have COVID-19  It is important to stay home if you are waiting for a testing appointment or for test results  Avoid close physical contact with anyone who does not live in your home  Do not shake hands with, hug, or kiss a person as a greeting  If you must use public transportation (such as a bus or subway), try to sit or stand away from others  Wear your face covering  Avoid in-person gatherings and crowds  Attend virtually if possible  Help strengthen your immune system:   Ask about other vaccines you may need  Get the influenza (flu) vaccine as soon as recommended each year, usually starting in September or October  Get the pneumonia vaccine if recommended  Your healthcare provider can tell you if you should also get other vaccines, and when to get them  Do not smoke  Nicotine and other chemicals in cigarettes and cigars can increase your risk for infection and for serious COVID-19 effects  Ask your healthcare provider for information if you currently smoke and need help to quit  E-cigarettes or smokeless tobacco still contain nicotine  Talk to your healthcare provider before you use these products  Eat a variety of healthy foods  Examples include vegetables, fruits, whole-grain breads and cereals, lean meats and poultry, fish, low-fat dairy products, and cooked beans  Healthy foods contain nutrients that help keep your immune system strong  Find ways to manage stress  You may be feeling more stressed than usual because of the COVID-19 outbreak  The situation is very stressful to many people  Talk to your healthcare providers about ways to manage stress during this time  Stress can lead to breathing problems or make the problems worse  Stress can trigger an attack or exacerbation of many health conditions  It is important to do things that help you feel more relaxed, such as the following:     Pick 1 or 2 times a day to watch the news  Constant news watching can increase your stress levels  Talk to a friend on the phone or through a video chat  Take a warm, soothing bath  Listen to music  Exercise can also help relieve stress  This may be hard if your regular gym or outdoor exercise area is closed  If you do not have exercise equipment at home, try walking inside your home  You can walk quickly or turn on music and dance  Follow up with your doctor or healthcare provider as directed: Your providers will tell you when you can come in for tests, procedures, or check-ups  Bring your symptom record with you to all appointments  Write down your questions so you remember to ask them during your visits    For more information:   [facility]  [address] [place] , [address]  Phone: 0- [phone]  Phone: 9- [phone]  Web Address: [employer]    © Copyright [employer] 2022 Information is for End User's use only and may not be sold, redistributed or otherwise used for commercial purposes  All illustrations and images included in CareNotes® are the copyrighted property of [employer]  or [facility] Bennie Reyna   The above information is an  only  It is not intended as medical advice for individual conditions or treatments  Talk to your doctor, nurse or pharmacist before following any medical regimen to see if it is safe and effective for you

## 2022-12-19 ENCOUNTER — OFFICE VISIT (OUTPATIENT)
Dept: INTERNAL MEDICINE CLINIC | Facility: CLINIC | Age: 62
End: 2022-12-19

## 2022-12-19 VITALS
HEART RATE: 87 BPM | SYSTOLIC BLOOD PRESSURE: 154 MMHG | HEIGHT: 63 IN | OXYGEN SATURATION: 99 % | BODY MASS INDEX: 51.21 KG/M2 | WEIGHT: 289 LBS | TEMPERATURE: 98.3 F | DIASTOLIC BLOOD PRESSURE: 84 MMHG

## 2022-12-19 DIAGNOSIS — Z13.220 ENCOUNTER FOR SCREENING FOR LIPID DISORDER: ICD-10-CM

## 2022-12-19 DIAGNOSIS — J40 BRONCHITIS: ICD-10-CM

## 2022-12-19 DIAGNOSIS — Z12.5 PROSTATE CANCER SCREENING: ICD-10-CM

## 2022-12-19 DIAGNOSIS — E66.01 MORBID OBESITY WITH BMI OF 50.0-59.9, ADULT (HCC): ICD-10-CM

## 2022-12-19 DIAGNOSIS — E78.00 HYPERCHOLESTEROLEMIA: ICD-10-CM

## 2022-12-19 DIAGNOSIS — Z13.29 SCREENING FOR THYROID DISORDER: ICD-10-CM

## 2022-12-19 DIAGNOSIS — I10 BENIGN ESSENTIAL HYPERTENSION: ICD-10-CM

## 2022-12-19 DIAGNOSIS — E11.9 TYPE 2 DIABETES MELLITUS WITHOUT COMPLICATION, WITHOUT LONG-TERM CURRENT USE OF INSULIN (HCC): ICD-10-CM

## 2022-12-19 DIAGNOSIS — Z00.00 ANNUAL PHYSICAL EXAM: Primary | ICD-10-CM

## 2022-12-19 PROBLEM — J06.9 UPPER RESPIRATORY TRACT INFECTION: Status: ACTIVE | Noted: 2022-12-19

## 2022-12-19 PROBLEM — Z86.16 HISTORY OF COVID-19: Status: RESOLVED | Noted: 2021-05-11 | Resolved: 2022-12-19

## 2022-12-19 PROBLEM — M79.89 LEG SWELLING: Status: RESOLVED | Noted: 2019-11-15 | Resolved: 2022-12-19

## 2022-12-19 LAB
SARS-COV-2 AG UPPER RESP QL IA: NEGATIVE
SL AMB POCT HEMOGLOBIN AIC: 8.2 (ref ?–6.5)
VALID CONTROL: NORMAL

## 2022-12-19 RX ORDER — AZITHROMYCIN 250 MG/1
TABLET, FILM COATED ORAL
Qty: 6 TABLET | Refills: 0 | Status: SHIPPED | OUTPATIENT
Start: 2022-12-19 | End: 2022-12-24

## 2022-12-19 NOTE — ASSESSMENT & PLAN NOTE
Probable bronchitis  Start azithromycin  Recommend probiotic  Will avoid steroids to prevent further hyperglycemia  Patient declines albuterol  Recommend Coricidin HBP  Discussed supportive treatment  Return to care if symptoms worsen or fail to improve

## 2022-12-19 NOTE — ASSESSMENT & PLAN NOTE
Discussed general health recommendations and screening guidelines  Patient refused colorectal cancer screening  Agreeable to prostate cancer screening  Recommend routine dental and eye exams  Declines all vaccinations  Next physical one year

## 2022-12-19 NOTE — PROGRESS NOTES
[address] [facility]    NAME: Emelyn Daugherty  AGE: 65 y o  SEX: male  : 1960     DATE: 2022     Assessment and Plan:     Problem List Items Addressed This Visit        Endocrine    Type 2 diabetes mellitus without complication, without long-term current use of insulin ([facility]       Lab Results   Component Value Date    HGBA1C 8 2 (A) 2022     Increase in a1c from 6 7 over one year ago  Admits to poor diet  Patient refuses any medication changes at this time  Patient states he will work on lifestyle changes  Nutrition recommendations include decreasing portion sizes, encouraging healthy choices of fruits and vegetables, decreasing fast food intake, consuming healthier snacks, limiting drinks that contain sugar, moderation in carbohydrate intake, increasing intake of lean protein, reducing intake of saturated and trans fat and reducing intake of cholesterol  Exercise recommendations include moderate physical activity 150 minutes/week, exercising 3-5 times per week and strength training exercises  Continue metformin 1000 mg BID  Continue Jardiance 10 mg daily  Continue checking sugar 1-2 times daily  Report any hypoglycemic events or symptoms  States he has his eye doctor appointment coming up  Please give contact information to clinic so we may receive a copy  Refused foot exam today  Follow up in 3 months, sooner if needed  Relevant Orders    POCT hemoglobin A1c (Completed)    CBC and differential       Respiratory    Bronchitis     Probable bronchitis  Start azithromycin  Recommend probiotic  Will avoid steroids to prevent further hyperglycemia  Patient declines albuterol  Recommend Coricidin HBP  Discussed supportive treatment  Return to care if symptoms worsen or fail to improve            Relevant Medications    azithromycin (Zithromax) 250 mg tablet    Other Relevant Orders    POCT Rapid Covid Ag (Completed)       Cardiovascular and Mediastinum    Benign essential hypertension     BP Readings from Last 3 Encounters:   12/19/22 154/84   11/01/22 122/76   05/16/22 153/87     Patient states he did not take his medications this morning  Discussed importance of medications  Continue carvedilol 6 25 mg daily  Continue torsemide 20 mg daily  Will evaluate at follow up  Other    Hypercholesterolemia     Lab Results   Component Value Date    CHOLESTEROL 97 04/20/2021    CHOLESTEROL 103 10/27/2020    CHOLESTEROL 121 10/01/2020     Lab Results   Component Value Date    HDL 36 (L) 04/20/2021    HDL 41 10/27/2020    HDL 37 (L) 10/01/2020     Lab Results   Component Value Date    TRIG 156 (H) 04/20/2021    TRIG 151 (H) 10/27/2020    TRIG 148 10/01/2020     Lab Results   Component Value Date    [place] 62 10/27/2020     Lab Results   Component Value Date    LDLCALC 30 04/20/2021     Due for recheck  Continue atorvastatin 40 mg daily  Low fat diet  Relevant Orders    Comprehensive metabolic panel    Annual physical exam - Primary     Discussed general health recommendations and screening guidelines  Patient refused colorectal cancer screening  Agreeable to prostate cancer screening  Recommend routine dental and eye exams  Declines all vaccinations  Next physical one year  Other Visit Diagnoses     Morbid obesity with BMI of 50 0-59 9, adult ([facility] )        Prostate cancer screening        Relevant Orders    PSA, total and free    Encounter for screening for lipid disorder        Relevant Orders    Lipid Panel with Direct LDL reflex    Screening for thyroid disorder        Relevant Orders    TSH, 3rd generation with Free T4 reflex          Immunizations and preventive care screenings were discussed with patient today  Appropriate education was printed on patient's after visit summary  Discussed risks and benefits of prostate cancer screening   We discussed the controversial history of PSA screening for prostate cancer in the [place] as well as the risk of over detection and over treatment of prostate cancer by way of PSA screening  The patient understands that PSA blood testing is an imperfect way to screen for prostate cancer and that elevated PSA levels in the blood may also be caused by infection, inflammation, prostatic trauma or manipulation, urological procedures, or by benign prostatic enlargement  The role of the digital rectal examination in prostate cancer screening was also discussed and I discussed with him that there is large interobserver variability in the findings of digital rectal examination  Counseling:  Alcohol/drug use: discussed moderation in alcohol intake, the recommendations for healthy alcohol use, and avoidance of illicit drug use  Dental Health: discussed importance of regular tooth brushing, flossing, and dental visits  Injury prevention: discussed safety/seat belts, safety helmets, smoke detectors, carbon dioxide detectors, and smoking near bedding or upholstery  Sexual health: discussed sexually transmitted diseases, partner selection, use of condoms, avoidance of unintended pregnancy, and contraceptive alternatives  · Exercise: the importance of regular exercise/physical activity was discussed  Recommend exercise 3-5 times per week for at least 30 minutes  BMI Counseling: Body mass index is 51 19 kg/m²  The BMI is above normal  Nutrition recommendations include decreasing portion sizes, encouraging healthy choices of fruits and vegetables, decreasing fast food intake, consuming healthier snacks, limiting drinks that contain sugar, moderation in carbohydrate intake, increasing intake of lean protein, reducing intake of saturated and trans fat and reducing intake of cholesterol  Exercise recommendations include moderate physical activity 150 minutes/week, exercising 3-5 times per week and strength training exercises  No pharmacotherapy was ordered  Rationale for BMI follow-up plan is due to patient being overweight or obese  Depression Screening and Follow-up Plan: Patient was screened for depression during today's encounter  They screened negative with a PHQ-2 score of 0  Return in about 3 months (around 3/19/2023) for Recheck DM - 40 mins  Chief Complaint:     Chief Complaint   Patient presents with   • Annual Exam      History of Present Illness:     Adult Annual Physical   Patient here for a comprehensive physical exam  The patient reports problems - congestion  States he has had it for 3 weeks or more  He did have COVID about 6 weeks ago  He states he got a little better, then felt sick again  He has not worsened, is just not getting better  Denies fever or chills  Denies fatigue or myalgia  Denies feeling dizzy or lightheaded  Admits to congestion, rhinorrhea, and PND  Admits to cough, sometimes productive of clear mucus  Occasional wheezing  No chest pain or palpitations  No SOB  He has not tried anything for this yet  Diet and Physical Activity  · Diet/Nutrition: well balanced diet, limited junk food and limited fruits/vegetables  · Exercise: no formal exercise  Depression Screening  PHQ-2/9 Depression Screening    Little interest or pleasure in doing things: 0 - not at all  Feeling down, depressed, or hopeless: 0 - not at all  PHQ-2 Score: 0  PHQ-2 Interpretation: Negative depression screen       General Health  · Sleep: sleeps well and gets 7-8 hours of sleep on average  · Hearing: normal - bilateral   · Vision: no vision problems and goes for regular eye exams  · Dental: regular dental visits and brushes teeth twice daily   Health  · Symptoms include: none     Review of Systems:     Review of Systems   Constitutional: Negative for appetite change, chills, diaphoresis, fatigue, fever and unexpected weight change  HENT: Positive for congestion, postnasal drip and rhinorrhea   Negative for hearing loss, sinus pressure, sinus pain, sore throat, tinnitus and trouble swallowing  Eyes: Negative for visual disturbance  Respiratory: Positive for cough, chest tightness and wheezing  Negative for shortness of breath  Cardiovascular: Negative for chest pain, palpitations and leg swelling  Gastrointestinal: Negative for abdominal pain, blood in stool, constipation, diarrhea, nausea and vomiting  Endocrine: Negative for cold intolerance, heat intolerance, polydipsia, polyphagia and polyuria  Genitourinary: Negative for decreased urine volume, difficulty urinating, dysuria, frequency and urgency  Musculoskeletal: Negative for arthralgias and myalgias  Skin: Negative for rash  Neurological: Negative for dizziness, tremors, weakness, light-headedness, numbness and headaches  Hematological: Negative for adenopathy  Psychiatric/Behavioral: Negative for dysphoric mood, self-injury, sleep disturbance and suicidal ideas  The patient is not nervous/anxious         Past Medical History:     Past Medical History:   Diagnosis Date   • COVID-19 virus infection 1/7/2021   • Diabetes mellitus ([facility] )    • History of COVID-19 5/11/2021   • History of gastroesophageal reflux (GERD)    • Hypertension     Last assessed: 10/24/13   • Sleep apnea    • Tinnitus 10/31/2019      Past Surgical History:     Past Surgical History:   Procedure Laterality Date   • AMPUTATION      of Finger, with Neurectomy and flaps      Family History:     Family History   Problem Relation Age of Onset   • Uterine cancer Mother    • Diabetes Father    • Vascular Disease Father         peripheral   • Bone cancer Sister    • Other Brother         severe head trauma      Social History:     Social History     Socioeconomic History   • Marital status: Single     Spouse name: None   • Number of children: None   • Years of education: None   • Highest education level: None   Occupational History   • Occupation: works odd jobs/ painting     Comment: part-time work   Tobacco Use   • Smoking status: Former     Types: Cigarettes     Quit date:      Years since quittin 9   • Smokeless tobacco: Never   Vaping Use   • Vaping Use: Never used   Substance and Sexual Activity   • Alcohol use: Never   • Drug use: Never   • Sexual activity: Yes     Partners: Female   Other Topics Concern   • None   Social History Narrative    Sedentary lifestyle     Social Determinants of Health     Financial Resource Strain: Low Risk    • Difficulty of Paying Living Expenses: Not hard at all   Food Insecurity: No Food Insecurity   • Worried About [address] in the Last Year: Never true   • Ran Out of Food in the Last Year: Never true   Transportation Needs: No Transportation Needs   • Lack of Transportation (Medical): No   • Lack of Transportation (Non-Medical): No   Physical Activity: Not on file   Stress: Not on file   Social Connections: Not on file   Intimate Partner Violence: Not on file   Housing Stability: Not on file      Current Medications:     Current Outpatient Medications   Medication Sig Dispense Refill   • ammonium lactate (LAC-HYDRIN) 12 % lotion APPLY TOPICALLY TO THE AFFECTED AREA TWO TIMES A  g 4   • atorvastatin (LIPITOR) 40 mg tablet TAKE 1 TABLET BY MOUTH EVERY DAY 90 tablet 3   • azithromycin (Zithromax) 250 mg tablet Take two tablets on day one, then one tablet daily until gone   6 tablet 0   • carvedilol (COREG) 6 25 mg tablet Take 6 25 mg by mouth     • Cholecalciferol (Vitamin D3) 50 MCG (2000) capsule TAKE 1 CAPSULE BY MOUTH EVERY DAY 90 capsule 3   • gabapentin (NEURONTIN) 300 mg capsule TAKE 1 CAPSULE BY MOUTH THREE TIMES A  capsule 3   • glucose blood (FREESTYLE LITE) test strip TEST ONCE DAILY 100 each 3   • Jardiance 10 MG TABS TAKE 1 TABLET BY MOUTH EVERY DAY IN THE MORNING 90 tablet 3   • lisinopril (ZESTRIL) 40 mg tablet TAKE 1 TABLET BY MOUTH EVERY DAY 90 tablet 3   • metFORMIN (GLUCOPHAGE) 1000 MG tablet TAKE 1 TABLET BY MOUTH TWICE A DAY WITH MEALS 180 tablet 3   • potassium chloride (K-DUR,KLOR-CON) 20 mEq tablet Take 40 mEq by mouth     • pramipexole (MIRAPEX) 0 125 mg tablet TAKE 1 TABLET BY MOUTH EVERY DAY 30 tablet 5   • torsemide (DEMADEX) 20 mg tablet 20 mg daily   1     No current facility-administered medications for this visit  Allergies:     No Known Allergies   Physical Exam:     /84 (BP Location: Right arm, Patient Position: Sitting, Cuff Size: Large)   Pulse 87   Temp 98 3 °F (36 8 °C) (Temporal)   Ht 5' 3" (1 6 m)   Wt 131 kg (289 lb)   SpO2 99%   BMI 51 19 kg/m²     Physical Exam  Vitals and nursing note reviewed  Constitutional:       General: He is awake  He is not in acute distress  Appearance: Normal appearance  He is well-developed and well-groomed  He is morbidly obese  He is not ill-appearing  HENT:      Head: Normocephalic and atraumatic  Nose: Congestion and rhinorrhea present  Rhinorrhea is clear  Eyes:      General: Lids are normal  No scleral icterus  Conjunctiva/sclera: Conjunctivae normal    Cardiovascular:      Rate and Rhythm: Normal rate and regular rhythm  Pulses: Normal pulses  Heart sounds: Normal heart sounds  No murmur heard  Pulmonary:      Effort: Pulmonary effort is normal  No respiratory distress  Breath sounds: No decreased air movement  Examination of the right-middle field reveals wheezing  Examination of the left-middle field reveals wheezing  Examination of the right-lower field reveals wheezing  Examination of the left-lower field reveals wheezing  Wheezing present  No decreased breath sounds, rhonchi or rales  Musculoskeletal:         General: Normal range of motion  Cervical back: Neck supple  Right lower le+ Pitting Edema present  Left lower le+ Pitting Edema present  Lymphadenopathy:      Cervical: No cervical adenopathy  Skin:     General: Skin is warm  Coloration: Skin is not jaundiced  Findings: No rash  Neurological:      General: No focal deficit present  Mental Status: He is alert and oriented to person, place, and time  Mental status is at baseline  Motor: Motor function is intact  Coordination: Coordination is intact  Gait: Gait is intact  Psychiatric:         Attention and Perception: Attention normal          Mood and Affect: Mood and affect normal          Speech: Speech normal          Behavior: Behavior normal  Behavior is cooperative  Thought Content:  Thought content normal          Cognition and Memory: Cognition normal           Niles Rivera

## 2022-12-19 NOTE — PATIENT INSTRUCTIONS
Wellness Visit for Adults   AMBULATORY CARE:   A wellness visit  is when you see your healthcare provider to get screened for health problems  Your healthcare provider will also give you advice on how to stay healthy  Write down your questions so you remember to ask them  Ask your healthcare provider how often you should have a wellness visit  What happens at a wellness visit:  Your healthcare provider will ask about your health, and your family history of health problems  This includes high blood pressure, heart disease, and cancer  He or she will ask if you have symptoms that concern you, if you smoke, and about your mood  You may also be asked about your intake of medicines, supplements, food, and alcohol  Any of the following may be done:  · Your weight  will be checked  Your height may also be checked so your body mass index (BMI) can be calculated  Your BMI shows if you are at a healthy weight  · Your blood pressure  and heart rate will be checked  Your temperature may also be checked  · Blood and urine tests  may be done  Blood tests may be done to check your cholesterol levels  Abnormal cholesterol levels increase your risk for heart disease and stroke  You may also need a blood or urine test to check for diabetes if you are at increased risk  Urine tests may be done to look for signs of an infection or kidney disease  · A physical exam  includes checking your heartbeat and lungs with a stethoscope  Your healthcare provider may also check your skin to look for sun damage  · Screening tests  may be recommended  A screening test is done to check for diseases that may not cause symptoms  The screening tests you may need depend on your age, gender, family history, and lifestyle habits  For example, colorectal screening may be recommended if you are 48years old or older  Screening tests you need if you are a woman:   · A Pap smear  is used to screen for cervical cancer   Pap smears are usually done every 3 to 5 years depending on your age  You may need them more often if you have had abnormal Pap smear test results in the past  Ask your healthcare provider how often you should have a Pap smear  · A mammogram  is an x-ray of your breasts to screen for breast cancer  Experts recommend mammograms every 2 years starting at age 48 years  You may need a mammogram at age 52 years or younger if you have an increased risk for breast cancer  Talk to your healthcare provider about when you should start having mammograms and how often you need them  Vaccines you may need:   · Get an influenza vaccine  every year  The influenza vaccine protects you from the flu  Several types of viruses cause the flu  The viruses change over time, so new vaccines are made each year  · Get a tetanus-diphtheria (Td) booster vaccine  every 10 years  This vaccine protects you against tetanus and diphtheria  Tetanus is a severe infection that may cause painful muscle spasms and lockjaw  Diphtheria is a severe bacterial infection that causes a thick covering in the back of your mouth and throat  · Get a human papillomavirus (HPV) vaccine  if you are female and aged 23 to 32 or male 23 to 24 and never received it  This vaccine protects you from HPV infection  HPV is the most common infection spread by sexual contact  HPV may also cause vaginal, penile, and anal cancers  · Get a pneumococcal vaccine  if you are aged 72 years or older  The pneumococcal vaccine is an injection given to protect you from pneumococcal disease  Pneumococcal disease is an infection caused by pneumococcal bacteria  The infection may cause pneumonia, meningitis, or an ear infection  · Get a shingles vaccine  if you are 60 or older, even if you have had shingles before  The shingles vaccine is an injection to protect you from the varicella-zoster virus  This is the same virus that causes chickenpox   Shingles is a painful rash that develops in people who had chickenpox or have been exposed to the virus  How to eat healthy:  My Plate is a model for planning healthy meals  It shows the types and amounts of foods that should go on your plate  Fruits and vegetables make up about half of your plate, and grains and protein make up the other half  A serving of dairy is included on the side of your plate  The amount of calories and serving sizes you need depends on your age, gender, weight, and height  Examples of healthy foods are listed below:  · Eat a variety of vegetables  such as dark green, red, and orange vegetables  You can also include canned vegetables low in sodium (salt) and frozen vegetables without added butter or sauces  · Eat a variety of fresh fruits , canned fruit in 100% juice, frozen fruit, and dried fruit  · Include whole grains  At least half of the grains you eat should be whole grains  Examples include whole-wheat bread, wheat pasta, brown rice, and whole-grain cereals such as oatmeal     · Eat a variety of protein foods such as seafood (fish and shellfish), lean meat, and poultry without skin (turkey and chicken)  Examples of lean meats include pork leg, shoulder, or tenderloin, and beef round, sirloin, tenderloin, and extra lean ground beef  Other protein foods include eggs and egg substitutes, beans, peas, soy products, nuts, and seeds  · Choose low-fat dairy products such as skim or 1% milk or low-fat yogurt, cheese, and cottage cheese  · Limit unhealthy fats  such as butter, hard margarine, and shortening  Exercise:  Exercise at least 30 minutes per day on most days of the week  Some examples of exercise include walking, biking, dancing, and swimming  You can also fit in more physical activity by taking the stairs instead of the elevator or parking farther away from stores  Include muscle strengthening activities 2 days each week  Regular exercise provides many health benefits   It helps you manage your weight, and decreases your risk for type 2 diabetes, heart disease, stroke, and high blood pressure  Exercise can also help improve your mood  Ask your healthcare provider about the best exercise plan for you  General health and safety guidelines:   · Do not smoke  Nicotine and other chemicals in cigarettes and cigars can cause lung damage  Ask your healthcare provider for information if you currently smoke and need help to quit  E-cigarettes or smokeless tobacco still contain nicotine  Talk to your healthcare provider before you use these products  · Limit alcohol  A drink of alcohol is 12 ounces of beer, 5 ounces of wine, or 1½ ounces of liquor  · Lose weight, if needed  Being overweight increases your risk of certain health conditions  These include heart disease, high blood pressure, type 2 diabetes, and certain types of cancer  · Protect your skin  Do not sunbathe or use tanning beds  Use sunscreen with a SPF 15 or higher  Apply sunscreen at least 15 minutes before you go outside  Reapply sunscreen every 2 hours  Wear protective clothing, hats, and sunglasses when you are outside  · Drive safely  Always wear your seatbelt  Make sure everyone in your car wears a seatbelt  A seatbelt can save your life if you are in an accident  Do not use your cell phone when you are driving  This could distract you and cause an accident  Pull over if you need to make a call or send a text message  · Practice safe sex  Use latex condoms if are sexually active and have more than one partner  Your healthcare provider may recommend screening tests for sexually transmitted infections (STIs)  · Wear helmets, lifejackets, and protective gear  Always wear a helmet when you ride a bike or motorcycle, go skiing, or play sports that could cause a head injury  Wear protective equipment when you play sports  Wear a lifejacket when you are on a boat or doing water sports      © Copyright [employer] 2022 Information is for End User's use only and may not be sold, redistributed or otherwise used for commercial purposes  All illustrations and images included in CareNotes® are the copyrighted property of [employer]  or [employer]  The above information is an  only  It is not intended as medical advice for individual conditions or treatments  Talk to your doctor, nurse or pharmacist before following any medical regimen to see if it is safe and effective for you  Weight Management   AMBULATORY CARE:   Why it is important to manage your weight:  Being overweight increases your risk of health conditions such as heart disease, high blood pressure, type 2 diabetes, and certain types of cancer  It can also increase your risk for osteoarthritis, sleep apnea, and other respiratory problems  Aim for a slow, steady weight loss  Even a small amount of weight loss can lower your risk of health problems  Risks of being overweight:  Extra weight can cause many health problems, including the following:  · Diabetes (high blood sugar level)    · High blood pressure or high cholesterol    · Heart disease    · Stroke    · Gallbladder or liver disease    · Cancer of the colon, breast, prostate, liver, or kidney    · Sleep apnea    · Arthritis or gout    Screening  is done to check for health conditions before you have signs or symptoms  If you are 28to 79years old, your blood sugar level may be checked every 3 years for signs of prediabetes or diabetes  Your healthcare provider will check your blood pressure at each visit  High blood pressure can lead to a stroke or other problems  Your provider may check for signs of heart disease, cancer, or other health problems  How to lose weight safely:  A safe and healthy way to lose weight is to eat fewer calories and get regular exercise  · You can lose up about 1 pound a week by decreasing the number of calories you eat by 500 calories each day   You can decrease calories by eating smaller portion sizes or by cutting out high-calorie foods  Read labels to find out how many calories are in the foods you eat  · You can also burn calories with exercise such as walking, swimming, or biking  You will be more likely to keep weight off if you make these changes part of your lifestyle  Exercise at least 30 minutes per day on most days of the week  You can also fit in more physical activity by taking the stairs instead of the elevator or parking farther away from stores  Ask your healthcare provider about the best exercise plan for you  Healthy meal plan for weight management:  A healthy meal plan includes a variety of foods, contains fewer calories, and helps you stay healthy  A healthy meal plan includes the following:     · Eat whole-grain foods more often  A healthy meal plan should contain fiber  Fiber is the part of grains, fruits, and vegetables that is not broken down by your body  Whole-grain foods are healthy and provide extra fiber in your diet  Some examples of whole-grain foods are whole-wheat breads and pastas, oatmeal, brown rice, and bulgur  · Eat a variety of vegetables every day  Include dark, leafy greens such as spinach, kale, jess greens, and mustard greens  Eat yellow and orange vegetables such as carrots, sweet potatoes, and winter squash  · Eat a variety of fruits every day  Choose fresh or canned fruit (canned in its own juice or light syrup) instead of juice  Fruit juice has very little or no fiber  · Eat low-fat dairy foods  Drink fat-free (skim) milk or 1% milk  Eat fat-free yogurt and low-fat cottage cheese  Try low-fat cheeses such as mozzarella and other reduced-fat cheeses  · Choose meat and other protein foods that are low in fat  Choose beans or other legumes such as split peas or lentils  Choose fish, skinless poultry (chicken or turkey), or lean cuts of red meat (beef or pork)   Before you cook meat or poultry, cut off any visible fat  · Use less fat and oil  Try baking foods instead of frying them  Add less fat, such as margarine, sour cream, regular salad dressing and mayonnaise to foods  Eat fewer high-fat foods  Some examples of high-fat foods include french fries, doughnuts, ice cream, and cakes  · Eat fewer sweets  Limit foods and drinks that are high in sugar  This includes candy, cookies, regular soda, and sweetened drinks  Ways to decrease calories:   · Eat smaller portions  ? Use a small plate with smaller servings  ? Do not eat second helpings  ? When you eat at a restaurant, ask for a box and place half of your meal in the box before you eat  ? Share an entrée with someone else  · Replace high-calorie snacks with healthy, low-calorie snacks  ? Choose fresh fruit, vegetables, fat-free rice cakes, or air-popped popcorn instead of potato chips, nuts, or chocolate  ? Choose water or calorie-free drinks instead of soda or sweetened drinks  · Do not shop for groceries when you are hungry  You may be more likely to make unhealthy food choices  Take a grocery list of healthy foods and shop after you have eaten  · Eat regular meals  Do not skip meals  Skipping meals can lead to overeating later in the day  This can make it harder for you to lose weight  Eat a healthy snack in place of a meal if you do not have time to eat a regular meal  Talk with a dietitian to help you create a meal plan and schedule that is right for you  Other things to consider as you try to lose weight:   · Be aware of situations that may give you the urge to overeat, such as eating while watching television  Find ways to avoid these situations  For example, read a book, go for a walk, or do crafts  · Meet with a weight loss support group or friends who are also trying to lose weight  This may help you stay motivated to continue working on your weight loss goals      © Copyright [employer] 2022 Information is for End User's use only and may not be sold, redistributed or otherwise used for commercial purposes  All illustrations and images included in CareNotes® are the copyrighted property of [employer]  or [employer]  The above information is an  only  It is not intended as medical advice for individual conditions or treatments  Talk to your doctor, nurse or pharmacist before following any medical regimen to see if it is safe and effective for you

## 2022-12-19 NOTE — ASSESSMENT & PLAN NOTE
Lab Results   Component Value Date    HGBA1C 8 2 (A) 12/19/2022     Increase in a1c from 6 7 over one year ago  Admits to poor diet  Patient refuses any medication changes at this time  Patient states he will work on lifestyle changes  Nutrition recommendations include decreasing portion sizes, encouraging healthy choices of fruits and vegetables, decreasing fast food intake, consuming healthier snacks, limiting drinks that contain sugar, moderation in carbohydrate intake, increasing intake of lean protein, reducing intake of saturated and trans fat and reducing intake of cholesterol  Exercise recommendations include moderate physical activity 150 minutes/week, exercising 3-5 times per week and strength training exercises  Continue metformin 1000 mg BID  Continue Jardiance 10 mg daily  Continue checking sugar 1-2 times daily  Report any hypoglycemic events or symptoms  States he has his eye doctor appointment coming up  Please give contact information to clinic so we may receive a copy  Refused foot exam today  Follow up in 3 months, sooner if needed

## 2022-12-19 NOTE — ASSESSMENT & PLAN NOTE
Lab Results   Component Value Date    CHOLESTEROL 97 04/20/2021    CHOLESTEROL 103 10/27/2020    CHOLESTEROL 121 10/01/2020     Lab Results   Component Value Date    HDL 36 (L) 04/20/2021    HDL 41 10/27/2020    HDL 37 (L) 10/01/2020     Lab Results   Component Value Date    TRIG 156 (H) 04/20/2021    TRIG 151 (H) 10/27/2020    TRIG 148 10/01/2020     Lab Results   Component Value Date    [place] 62 10/27/2020     Lab Results   Component Value Date    LDLCALC 30 04/20/2021     Due for recheck  Continue atorvastatin 40 mg daily  Low fat diet

## 2022-12-19 NOTE — ASSESSMENT & PLAN NOTE
BP Readings from Last 3 Encounters:   12/19/22 154/84   11/01/22 122/76   05/16/22 153/87     Patient states he did not take his medications this morning  Discussed importance of medications  Continue carvedilol 6 25 mg daily  Continue torsemide 20 mg daily  Will evaluate at follow up

## 2022-12-21 ENCOUNTER — APPOINTMENT (OUTPATIENT)
Dept: LAB | Facility: CLINIC | Age: 62
End: 2022-12-21

## 2022-12-21 DIAGNOSIS — E78.00 HYPERCHOLESTEROLEMIA: ICD-10-CM

## 2022-12-21 DIAGNOSIS — Z13.29 SCREENING FOR THYROID DISORDER: ICD-10-CM

## 2022-12-21 DIAGNOSIS — Z13.220 ENCOUNTER FOR SCREENING FOR LIPID DISORDER: ICD-10-CM

## 2022-12-21 DIAGNOSIS — Z12.5 PROSTATE CANCER SCREENING: ICD-10-CM

## 2022-12-21 DIAGNOSIS — E11.9 TYPE 2 DIABETES MELLITUS WITHOUT COMPLICATION, WITHOUT LONG-TERM CURRENT USE OF INSULIN (HCC): ICD-10-CM

## 2022-12-21 LAB
ALBUMIN SERPL BCP-MCNC: 3.4 G/DL (ref 3.5–5)
ALP SERPL-CCNC: 103 U/L (ref 46–116)
ALT SERPL W P-5'-P-CCNC: 40 U/L (ref 12–78)
ANION GAP SERPL CALCULATED.3IONS-SCNC: 4 MMOL/L (ref 4–13)
AST SERPL W P-5'-P-CCNC: 28 U/L (ref 5–45)
BASOPHILS # BLD AUTO: 0.03 THOUSANDS/ÂΜL (ref 0–0.1)
BASOPHILS NFR BLD AUTO: 0 % (ref 0–1)
BILIRUB SERPL-MCNC: 0.64 MG/DL (ref 0.2–1)
BUN SERPL-MCNC: 12 MG/DL (ref 5–25)
CALCIUM ALBUM COR SERPL-MCNC: 9.6 MG/DL (ref 8.3–10.1)
CALCIUM SERPL-MCNC: 9.1 MG/DL (ref 8.3–10.1)
CHLORIDE SERPL-SCNC: 102 MMOL/L (ref 96–108)
CO2 SERPL-SCNC: 32 MMOL/L (ref 21–32)
CREAT SERPL-MCNC: 0.77 MG/DL (ref 0.6–1.3)
EOSINOPHIL # BLD AUTO: 0.18 THOUSAND/ÂΜL (ref 0–0.61)
EOSINOPHIL NFR BLD AUTO: 3 % (ref 0–6)
ERYTHROCYTE [DISTWIDTH] IN BLOOD BY AUTOMATED COUNT: 15.2 % (ref 11.6–15.1)
GFR SERPL CREATININE-BSD FRML MDRD: 97 ML/MIN/1.73SQ M
GLUCOSE SERPL-MCNC: 137 MG/DL (ref 65–140)
HCT VFR BLD AUTO: 46.2 % (ref 36.5–49.3)
HGB BLD-MCNC: 13.8 G/DL (ref 12–17)
IMM GRANULOCYTES # BLD AUTO: 0.01 THOUSAND/UL (ref 0–0.2)
IMM GRANULOCYTES NFR BLD AUTO: 0 % (ref 0–2)
LYMPHOCYTES # BLD AUTO: 1.82 THOUSANDS/ÂΜL (ref 0.6–4.47)
LYMPHOCYTES NFR BLD AUTO: 25 % (ref 14–44)
MCH RBC QN AUTO: 26.4 PG (ref 26.8–34.3)
MCHC RBC AUTO-ENTMCNC: 29.9 G/DL (ref 31.4–37.4)
MCV RBC AUTO: 88 FL (ref 82–98)
MONOCYTES # BLD AUTO: 0.72 THOUSAND/ÂΜL (ref 0.17–1.22)
MONOCYTES NFR BLD AUTO: 10 % (ref 4–12)
NEUTROPHILS # BLD AUTO: 4.44 THOUSANDS/ÂΜL (ref 1.85–7.62)
NEUTS SEG NFR BLD AUTO: 62 % (ref 43–75)
NRBC BLD AUTO-RTO: 0 /100 WBCS
PLATELET # BLD AUTO: 215 THOUSANDS/UL (ref 149–390)
PMV BLD AUTO: 8.8 FL (ref 8.9–12.7)
POTASSIUM SERPL-SCNC: 4.1 MMOL/L (ref 3.5–5.3)
PROT SERPL-MCNC: 8 G/DL (ref 6.4–8.4)
RBC # BLD AUTO: 5.23 MILLION/UL (ref 3.88–5.62)
SODIUM SERPL-SCNC: 138 MMOL/L (ref 135–147)
TSH SERPL DL<=0.05 MIU/L-ACNC: 1.83 UIU/ML (ref 0.45–4.5)
WBC # BLD AUTO: 7.2 THOUSAND/UL (ref 4.31–10.16)

## 2022-12-22 LAB
PSA FREE MFR SERPL: 45 %
PSA FREE SERPL-MCNC: 0.27 NG/ML
PSA SERPL-MCNC: 0.6 NG/ML (ref 0–4)

## 2023-01-02 ENCOUNTER — APPOINTMENT (OUTPATIENT)
Dept: LAB | Facility: CLINIC | Age: 63
End: 2023-01-02

## 2023-01-02 LAB
CHOLEST SERPL-MCNC: 128 MG/DL
HDLC SERPL-MCNC: 44 MG/DL
LDLC SERPL CALC-MCNC: 53 MG/DL (ref 0–100)
TRIGL SERPL-MCNC: 156 MG/DL

## 2023-01-03 ENCOUNTER — TELEPHONE (OUTPATIENT)
Dept: INTERNAL MEDICINE CLINIC | Facility: CLINIC | Age: 63
End: 2023-01-03

## 2023-01-03 ENCOUNTER — OFFICE VISIT (OUTPATIENT)
Dept: MULTI SPECIALTY CLINIC | Facility: CLINIC | Age: 63
End: 2023-01-03

## 2023-01-03 VITALS
HEART RATE: 92 BPM | WEIGHT: 293 LBS | BODY MASS INDEX: 51.9 KG/M2 | OXYGEN SATURATION: 97 % | DIASTOLIC BLOOD PRESSURE: 75 MMHG | TEMPERATURE: 97.7 F | SYSTOLIC BLOOD PRESSURE: 149 MMHG

## 2023-01-03 DIAGNOSIS — E11.42 DIABETIC POLYNEUROPATHY ASSOCIATED WITH TYPE 2 DIABETES MELLITUS (HCC): ICD-10-CM

## 2023-01-03 DIAGNOSIS — I87.2 VENOUS INSUFFICIENCY: ICD-10-CM

## 2023-01-03 DIAGNOSIS — B35.1 ONYCHOMYCOSIS: Primary | ICD-10-CM

## 2023-01-03 DIAGNOSIS — E11.9 TYPE 2 DIABETES MELLITUS WITHOUT COMPLICATION, WITHOUT LONG-TERM CURRENT USE OF INSULIN (HCC): ICD-10-CM

## 2023-01-03 NOTE — TELEPHONE ENCOUNTER
It may or may not be due to the Onetha Mina  I would recommend scheduling an appt to discuss  If it is, we could stop the Jardiance and replace it with a different med  He could schedule with any available provider  He could probably do a same day as well as it is acute

## 2023-01-03 NOTE — PROGRESS NOTES
[address]  65 y o  male MRN: [id number]  Encounter: [id number]      Assessment/Plan        Diagnoses and all orders for this visit:    Onychomycosis    Type 2 diabetes mellitus without complication, without long-term current use of insulin ([facility] )  -     Ambulatory Referral to Podiatry    Diabetic polyneuropathy associated with type 2 diabetes mellitus ([facility] )  -     Ambulatory Referral to Podiatry    Venous insufficiency  -     Ambulatory Referral to Podiatry       Plan:  • Patient was seen/examined  All questions and concerns addressed  • Nails x10 were sharply trimmed to normal length and thickness with a large nail nipper without incident (CPT 70762)  • Educated patient on proper diabetic foot care; checking feet every day, wearing white socks, always wearing diabetic shoes even in house, tight glycemic control, proper low-sugar diet and exercise  • Continue daily use of bilateral compression stockings 20-30mmHg  • RTC in 4 month(s)    Dr Sadiq Mahajan was present during this entire procedure  History of Present Illness     HPI: Adrian Salcido  Is a 66 y/o male presenting for elongated and thickened toenails, and routine diabetic foot care  He states that his nails are long and painful, and make shoe gear difficult  He states that he comes every 4 months to have the nails trimmed  He admits to occasional numbness and tingling in his feet bilaterally  He also states that he has a new wound on his leg, however does not want it evaluated as he sees a specialist at a wound center to have it treated, and cares for it at home  Review of Systems   Constitutional: Negative  HENT: Negative  Eyes: Negative  Respiratory: Negative  Cardiovascular: Negative  Gastrointestinal: Negative  Musculoskeletal: negative  Skin: elongated and thickened toenails  Neurological: Negative         Historical Information   Past Medical History:   Diagnosis Date   • COVID-19 virus infection 1/7/2021   • Diabetes mellitus ([facility] )    • History of COVID-19 2021   • History of gastroesophageal reflux (GERD)    • Hypertension     Last assessed: 10/24/13   • Sleep apnea    • Tinnitus 10/31/2019     Past Surgical History:   Procedure Laterality Date   • AMPUTATION      of Finger, with Neurectomy and flaps     Social History   Social History     Substance and Sexual Activity   Alcohol Use Never     Social History     Substance and Sexual Activity   Drug Use Never     Social History     Tobacco Use   Smoking Status Former   • Types: Cigarettes   • Quit date:    • Years since quittin 0   Smokeless Tobacco Never     Family History:   Family History   Problem Relation Age of Onset   • Uterine cancer Mother    • Diabetes Father    • Vascular Disease Father         peripheral   • Bone cancer Sister    • Other Brother         severe head trauma       Meds/Allergies   (Not in a hospital admission)    No Known Allergies    Objective     Current Vitals:   Blood Pressure: 149/75 (23 1105)  Pulse: 92 (23 1105)  Temperature: 97 7 °F (36 5 °C) (23 1105)  Temp Source: Temporal (23 1105)  Weight - Scale: 133 kg (293 lb) (23 1105)  SpO2: 97 % (23 1105)        /75 (BP Location: Right arm, Patient Position: Sitting, Cuff Size: Large)   Pulse 92   Temp 97 7 °F (36 5 °C) (Temporal)   Wt 133 kg (293 lb)   SpO2 97%   BMI 51 90 kg/m²       Lower Extremity Exam:    Vascular: Right foot DP/PT +1                   Left foot DP/PT +1                   There is +1 lower extremity edema bilateral foot and ankle  Capillary refill > 3 seconds      Musculoskeletal: There is 5/5 strength throughout the bilateral lower extremity  full ankle range of motion with diminished subtalar range of motion                                    There is no tenderness over the foot and ankle                                      There is not foot deformities      Neurological: Sensation to 5 07 [place] nylon filament: positive bilaterally                           Vibratory sense to distal Foot  positive bilaterally                           Sharp/Dull sense is positive bilaterally       Dermatology: Skin Condition:  Hemosiderin staining of bilateral legs  There is not evidence of macerated tissue between toe spaces                              Nail Exam: onychomycosis of the toenails                            Open ulcerations: No                          Calluses: No

## 2023-01-03 NOTE — TELEPHONE ENCOUNTER
Patient was here today for a podiatry appt, when patient stated he has been experiencing some dizziness  Patient denied any SOB, chest pain, headaches, but did state that the dizziness started about 2 months ago and comes and goes  Patient believes is a side effect form the Jardiance that he has been taking for years  Patient was recently seen on 12/19/2022, but forgot to mention this at that time  Patient also stated he was going to stop taking the Jardiance, but wanted to inform the provider first  Please review and advise  Patient aware he might need to be seen to further discuss

## 2023-01-03 NOTE — TELEPHONE ENCOUNTER
Please call and schedule pt for next available appt  Can be a same day slot per [facility]

## 2023-01-11 NOTE — TELEPHONE ENCOUNTER
Patient called the office regarding another issue  However, I did ask the patient if was still feeling dizzy  Patient denies still feeling dizzy stating "it was just a one time thing from bending over and getting up too fast" and "everyone gets dizzy every once in a while"   Patient did not want to make an appointment

## 2023-01-13 ENCOUNTER — OFFICE VISIT (OUTPATIENT)
Dept: WOUND CARE | Facility: HOSPITAL | Age: 63
End: 2023-01-13

## 2023-01-13 VITALS
HEART RATE: 100 BPM | BODY MASS INDEX: 43.65 KG/M2 | TEMPERATURE: 96.8 F | DIASTOLIC BLOOD PRESSURE: 78 MMHG | RESPIRATION RATE: 20 BRPM | HEIGHT: 65 IN | WEIGHT: 262 LBS | SYSTOLIC BLOOD PRESSURE: 137 MMHG

## 2023-01-13 DIAGNOSIS — L97.911 NON-PRESSURE CHRONIC ULCER RIGHT LOWER LEG, LIMITED TO BREAKDOWN SKIN (HCC): ICD-10-CM

## 2023-01-13 DIAGNOSIS — E11.9 TYPE 2 DIABETES MELLITUS WITHOUT COMPLICATION, WITHOUT LONG-TERM CURRENT USE OF INSULIN (HCC): ICD-10-CM

## 2023-01-13 DIAGNOSIS — I87.331 CHRONIC VENOUS HYPERTENSION (IDIOPATHIC) WITH ULCER AND INFLAMMATION OF RIGHT LOWER EXTREMITY (HCC): Primary | ICD-10-CM

## 2023-01-13 DIAGNOSIS — L03.115 CELLULITIS OF RIGHT LOWER EXTREMITY: ICD-10-CM

## 2023-01-13 DIAGNOSIS — L97.919 CHRONIC VENOUS HYPERTENSION (IDIOPATHIC) WITH ULCER AND INFLAMMATION OF RIGHT LOWER EXTREMITY (HCC): Primary | ICD-10-CM

## 2023-01-13 RX ORDER — CEPHALEXIN 500 MG/1
500 CAPSULE ORAL EVERY 6 HOURS SCHEDULED
Qty: 28 CAPSULE | Refills: 0 | Status: SHIPPED | OUTPATIENT
Start: 2023-01-13 | End: 2023-01-20

## 2023-01-13 RX ORDER — LIDOCAINE 40 MG/G
CREAM TOPICAL ONCE
Status: COMPLETED | OUTPATIENT
Start: 2023-01-13 | End: 2023-01-13

## 2023-01-13 RX ORDER — SULFAMETHOXAZOLE AND TRIMETHOPRIM 800; 160 MG/1; MG/1
1 TABLET ORAL EVERY 12 HOURS SCHEDULED
Qty: 14 TABLET | Refills: 0 | Status: SHIPPED | OUTPATIENT
Start: 2023-01-13 | End: 2023-01-20

## 2023-01-13 RX ADMIN — LIDOCAINE: 40 CREAM TOPICAL at 10:52

## 2023-01-13 NOTE — PATIENT INSTRUCTIONS
Orders Placed This Encounter   Procedures    Wound cleansing and dressings     RLE wounds:  Wound care every other day and as needed for strikethrough drainage  On dressing change days; remove the dressing and shower  Redress the wound immediately after you dry off - do not leave the wounds open to the air  Apply [employer] or equivalent cut or torn to fit each open area  Cover with gauze and absorbant pad  Secure with rolled gauze and tape  The above was applied today  Standing Status:   Future     Standing Expiration Date:   1/13/2024    Wound compression and edema control     Elastic Tubular Stocking Size F to the right leg  Tubular elastic bandage: Apply from base of toes to behind the knee  Apply in AM, may remove for sleep  Avoid prolonged standing in one place  Elevate leg(s) above the level of the heart when sitting or as much as possible  This was applied today  Wear your compression sock on the left leg  Standing Status:   Future     Standing Expiration Date:   1/13/2024    Wound miscellaneous orders     Take antibiotic as ordered  A request for supplies will be faxed to [address] [place] [address]  Their telephone number is [phone]  Once approved by your insurance the supplies will be delivered to your home       Standing Status:   Future     Standing Expiration Date:   1/13/2024    Debridement     This order was created via procedure documentation

## 2023-01-16 ENCOUNTER — TELEPHONE (OUTPATIENT)
Dept: WOUND CARE | Facility: HOSPITAL | Age: 63
End: 2023-01-16

## 2023-01-17 ENCOUNTER — OFFICE VISIT (OUTPATIENT)
Dept: WOUND CARE | Facility: HOSPITAL | Age: 63
End: 2023-01-17

## 2023-01-17 VITALS
TEMPERATURE: 96 F | DIASTOLIC BLOOD PRESSURE: 72 MMHG | HEART RATE: 92 BPM | SYSTOLIC BLOOD PRESSURE: 152 MMHG | RESPIRATION RATE: 12 BRPM

## 2023-01-17 DIAGNOSIS — L97.919 CHRONIC VENOUS HYPERTENSION (IDIOPATHIC) WITH ULCER AND INFLAMMATION OF RIGHT LOWER EXTREMITY (HCC): Primary | ICD-10-CM

## 2023-01-17 DIAGNOSIS — I87.331 CHRONIC VENOUS HYPERTENSION (IDIOPATHIC) WITH ULCER AND INFLAMMATION OF RIGHT LOWER EXTREMITY (HCC): Primary | ICD-10-CM

## 2023-01-17 DIAGNOSIS — L97.911 NON-PRESSURE CHRONIC ULCER RIGHT LOWER LEG, LIMITED TO BREAKDOWN SKIN (HCC): ICD-10-CM

## 2023-01-17 RX ORDER — LIDOCAINE 40 MG/G
CREAM TOPICAL ONCE
Status: COMPLETED | OUTPATIENT
Start: 2023-01-17 | End: 2023-01-17

## 2023-01-17 RX ADMIN — LIDOCAINE 1 APPLICATION: 40 CREAM TOPICAL at 15:16

## 2023-01-17 NOTE — PATIENT INSTRUCTIONS
Orders Placed This Encounter   Procedures    Wound cleansing and dressings     RLE wounds:  Wound care every other day and as needed for strikethrough drainage  On dressing change days; remove the dressing and shower  Redress the wound immediately after you dry off - do not leave the wounds open to the air  Apply allevyn form to wounds     The above was applied today  Standing Status:   Future     Standing Expiration Date:   1/17/2024    Wound compression and edema control     Elastic Tubular Stocking Size F to the right leg  Tubular elastic bandage: Apply from base of toes to behind the knee  Apply in AM, may remove for sleep  Avoid prolonged standing in one place  Elevate leg(s) above the level of the heart when sitting or as much as possible  This was applied today  Wear your compression sock on the left leg       Standing Status:   Future     Standing Expiration Date:   1/17/2024    Wound miscellaneous orders     Standing Status:   Future     Standing Expiration Date:   1/17/2024

## 2023-01-17 NOTE — PROGRESS NOTES
Patient ID: Rani Babin  is a 65 y o  male Date of Birth 1960     Chief Complaint  Chief Complaint   Patient presents with   • Follow Up Wound Care Visit     RLE wounds       Allergies  Patient has no known allergies  Assessment:    No problem-specific Assessment & Plan notes found for this encounter  Diagnoses and all orders for this visit:    Chronic venous hypertension (idiopathic) with ulcer and inflammation of right lower extremity (HCC)  -     lidocaine (LMX) 4 % cream  -     Wound cleansing and dressings; Future  -     Wound compression and edema control; Future  -     Wound miscellaneous orders; Future  -     Debridement    Non-pressure chronic ulcer right lower leg, limited to breakdown skin ([address] )              Debridement   Wound 01/13/23 Venous Ulcer Leg Right    Universal Protocol:  Consent: Verbal consent obtained  Consent given by: patient  Time out: Immediately prior to procedure a "time out" was called to verify the correct patient, procedure, equipment, support staff and site/side marked as required  Timeout called at: 1/17/2023 3:05 PM   Patient understanding: patient states understanding of the procedure being performed  Patient identity confirmed: verbally with patient      Performed by: physician  Debridement type: selective  Pain control: lidocaine 4%  Post-debridement measurements  Length (cm): 2 5  Width (cm): 13 5  Depth (cm): 0 1  Percent debrided: 10%  Surface Area (cm^2): 33 75  Area debrided (cm^2): 3 38  Volume (cm^3): 3 38  Devitalized tissue debrided: biofilm  Instrument(s) utilized: curette  Bleeding: small  Hemostasis obtained with: pressure  Response to treatment: procedure was tolerated well          Plan:  Overall improved  Debrided as above  Change wound management to dry dressing, Allevyn applied today but patient states that insurance will not cover his wound supplies    See wound orders below  Continue compression with Tubigrip  Keep legs elevated whenever seated and avoid prolonged standing  Follow-up in 2 weeks or call sooner with questions or concerns    Wound 01/13/23 Venous Ulcer Leg Right (Active)   Wound Image Images linked 01/17/23 1443   Wound Description Epithelialization;Granulation tissue;Pink;Slough; White 01/17/23 1447   Vaishnavi-wound Assessment Erythema;Edema;Dry;Scar Tissue 01/17/23 1447   Wound Length (cm) 2 5 cm 01/17/23 1447   Wound Width (cm) 13 5 cm 01/17/23 1447   Wound Depth (cm) 0 1 cm 01/17/23 1447   Wound Surface Area (cm^2) 33 75 cm^2 01/17/23 1447   Wound Volume (cm^3) 3 375 cm^3 01/17/23 1447   Calculated Wound Volume (cm^3) 3 38 cm^3 01/17/23 1447   Change in Wound Size % 81 01 01/17/23 1447   Drainage Amount Small 01/17/23 1447   Drainage Description Serosanguineous 01/17/23 1447   Non-staged Wound Description Full thickness 01/17/23 1447   Patient Tolerance Tolerated well 01/17/23 1447   Dressing Status Intact 01/17/23 1447       Wound 01/13/23 Venous Ulcer Leg Right (Active)   Date First Assessed/Time First Assessed: 01/13/23 0938   Pre-Existing Wound: Yes  Primary Wound Type: Venous Ulcer  Location: Leg  Wound Location Orientation: Right  Wound Description (Comments): MULTIPLE OPEN AREAS       Subjective:        Patient presents for follow-up of right lower extremity venous ulcers  No increased pain or drainage  Has been using Opticell Ag on the wounds and Tubigrip for compression  Patient completed a course of Keflex since last visit and overall notes improvement  The following portions of the patient's history were reviewed and updated as appropriate:   He  has a past medical history of COVID-19 virus infection (1/7/2021), Diabetes mellitus ([address] ), History of COVID-19 (5/11/2021), History of gastroesophageal reflux (GERD), Hypertension, Sleep apnea, and Tinnitus (10/31/2019)    He   Patient Active Problem List    Diagnosis Date Noted   • Bronchitis 12/19/2022   • Annual physical exam 12/19/2022   • Arthritis of both knees 10/06/2021   • Diabetic polyneuropathy associated with type 2 diabetes mellitus ([facility] ) 05/11/2021   • Chronic bilateral low back pain    • Venous insufficiency 03/26/2019   • Periodic limb movement sleep disorder 07/11/2017   • Obstructive sleep apnea 06/01/2016   • Type 2 diabetes mellitus without complication, without long-term current use of insulin ([facility] ) 04/25/2016   • Vitamin D deficiency 04/25/2016   • Hypercholesterolemia 10/24/2013   • Benign essential hypertension 06/14/2012     He  reports that he quit smoking about 23 years ago  His smoking use included cigarettes  He has never used smokeless tobacco  He reports that he does not drink alcohol and does not use drugs    Current Outpatient Medications   Medication Sig Dispense Refill   • ammonium lactate (LAC-HYDRIN) 12 % lotion APPLY TOPICALLY TO THE AFFECTED AREA TWO TIMES A  g 4   • atorvastatin (LIPITOR) 40 mg tablet TAKE 1 TABLET BY MOUTH EVERY DAY 90 tablet 3   • carvedilol (COREG) 6 25 mg tablet Take 6 25 mg by mouth     • cephalexin (KEFLEX) 500 mg capsule Take 1 capsule (500 mg total) by mouth every 6 (six) hours for 7 days 28 capsule 0   • Cholecalciferol (Vitamin D3) 50 MCG (2000 UT) capsule TAKE 1 CAPSULE BY MOUTH EVERY DAY 90 capsule 3   • gabapentin (NEURONTIN) 300 mg capsule TAKE 1 CAPSULE BY MOUTH THREE TIMES A  capsule 3   • glucose blood (FREESTYLE LITE) test strip TEST ONCE DAILY 100 each 3   • Jardiance 10 MG TABS TAKE 1 TABLET BY MOUTH EVERY DAY IN THE MORNING 90 tablet 3   • lisinopril (ZESTRIL) 40 mg tablet TAKE 1 TABLET BY MOUTH EVERY DAY 90 tablet 3   • metFORMIN (GLUCOPHAGE) 1000 MG tablet TAKE 1 TABLET BY MOUTH TWICE A DAY WITH MEALS 180 tablet 3   • potassium chloride (K-DUR,KLOR-CON) 20 mEq tablet Take 40 mEq by mouth     • pramipexole (MIRAPEX) 0 125 mg tablet TAKE 1 TABLET BY MOUTH EVERY DAY 30 tablet 5   • sulfamethoxazole-trimethoprim (BACTRIM DS) 800-160 mg per tablet Take 1 tablet by mouth every 12 (twelve) hours for 7 days (Patient not taking: Reported on 1/17/2023) 14 tablet 0   • torsemide (DEMADEX) 20 mg tablet 20 mg daily   1     No current facility-administered medications for this visit  He has No Known Allergies       Review of Systems   Constitutional: Negative for chills and fever  HENT: Negative for congestion and sneezing  Respiratory: Negative for cough  Cardiovascular: Positive for leg swelling  Skin: Positive for wound  Psychiatric/Behavioral: Negative for agitation  Objective:       Wound 01/13/23 Venous Ulcer Leg Right (Active)   Wound Image Images linked 01/17/23 1443   Wound Description Epithelialization;Granulation tissue;Pink;Slough; White 01/17/23 1447   Vaishnavi-wound Assessment Erythema;Edema;Dry;Scar Tissue 01/17/23 1447   Wound Length (cm) 2 5 cm 01/17/23 1447   Wound Width (cm) 13 5 cm 01/17/23 1447   Wound Depth (cm) 0 1 cm 01/17/23 1447   Wound Surface Area (cm^2) 33 75 cm^2 01/17/23 1447   Wound Volume (cm^3) 3 375 cm^3 01/17/23 1447   Calculated Wound Volume (cm^3) 3 38 cm^3 01/17/23 1447   Change in Wound Size % 81 01 01/17/23 1447   Drainage Amount Small 01/17/23 1447   Drainage Description Serosanguineous 01/17/23 1447   Non-staged Wound Description Full thickness 01/17/23 1447   Patient Tolerance Tolerated well 01/17/23 1447   Dressing Status Intact 01/17/23 1447       /72   Pulse 92   Temp (!) 96 °F (35 6 °C)   Resp 12     Physical Exam        Wound 01/13/23 Venous Ulcer Leg Right (Active)   Wound Image     01/17/23 1443   Wound Description Epithelialization;Granulation tissue;Pink;Slough; White 01/17/23 1447   Vaishnavi-wound Assessment Erythema;Edema;Dry;Scar Tissue 01/17/23 1447   Wound Length (cm) 2 5 cm 01/17/23 1447   Wound Width (cm) 13 5 cm 01/17/23 1447   Wound Depth (cm) 0 1 cm 01/17/23 1447   Wound Surface Area (cm^2) 33 75 cm^2 01/17/23 1447   Wound Volume (cm^3) 3 375 cm^3 01/17/23 1447   Calculated Wound Volume (cm^3) 3 38 cm^3 01/17/23 144 Change in Wound Size % 81 01 01/17/23 1447   Drainage Amount Small 01/17/23 1447   Drainage Description Serosanguineous 01/17/23 1447   Non-staged Wound Description Full thickness 01/17/23 1447   Patient Tolerance Tolerated well 01/17/23 1447   Dressing Status Intact 01/17/23 1447                       Wound Instructions:  Orders Placed This Encounter   Procedures   • Wound cleansing and dressings     RLE wounds:  Wound care every other day and as needed for strikethrough drainage  On dressing change days; remove the dressing and shower  Redress the wound immediately after you dry off - do not leave the wounds open to the air  Apply allevyn form to wounds     The above was applied today  Standing Status:   Future     Standing Expiration Date:   1/17/2024   • Wound compression and edema control     Elastic Tubular Stocking Size F to the right leg       Tubular elastic bandage: Apply from base of toes to behind the knee  Apply in AM, may remove for sleep      Avoid prolonged standing in one place      Elevate leg(s) above the level of the heart when sitting or as much as possible       This was applied today       Wear your compression sock on the left leg  Standing Status:   Future     Standing Expiration Date:   1/17/2024   • Wound miscellaneous orders     Standing Status:   Future     Standing Expiration Date:   1/17/2024   • Debridement     This order was created via procedure documentation        Diagnosis ICD-10-CM Associated Orders   1  Chronic venous hypertension (idiopathic) with ulcer and inflammation of right lower extremity ([facility])  I87 331 lidocaine (LMX) 4 % cream    L93 246 Wound cleansing and dressings     Wound compression and edema control     Wound miscellaneous orders     Debridement      2   Non-pressure chronic ulcer right lower leg, limited to breakdown skin ([facility] )  [id number]

## 2023-01-23 NOTE — PATIENT INSTRUCTIONS
Stasis Dermatitis   WHAT YOU NEED TO KNOW:   Stasis dermatitis is a condition that develops when blood pools in your lower legs  It is caused by poor blood flow back to your heart  DISCHARGE INSTRUCTIONS:   Call 911 for any of the following:   · You feel lightheaded, short of breath, and have chest pain  · You cough up blood  Return to the emergency department if:   · Your leg feels warm, tender, and painful  It may look swollen and red  Contact your healthcare provider if:   · You have a fever  · Your pain is not getting better, even with treatment  · You have new or worse open sores  · Your sores are draining pus  · Your movement is limited  · You have questions or concerns about your condition or care  Medicines:   · Medicines  help improve blood flow from your legs to your heart and decrease swelling  · Take your medicine as directed  Contact your healthcare provider if you think your medicine is not helping or if you have side effects  Tell him of her if you are allergic to any medicine  Keep a list of the medicines, vitamins, and herbs you take  Include the amounts, and when and why you take them  Bring the list or the pill bottles to follow-up visits  Carry your medicine list with you in case of an emergency  Manage your symptoms:   · Wear pressure stockings  These tight elastic stockings put pressure on your legs  This improves blood flow and prevents blood from collecting in your legs  · Elevate  your legs above the level of your heart for 30 minutes, 4 times a day  This will help decrease swelling and pain  Prop your legs on pillows or blankets to keep them elevated comfortably  · Apply unscented lotions or creams  to help keep your skin moist and decrease itching  · Do not scratch your legs  Your skin can break open if you scratch  This can lead to sores or an infection  Lifestyle changes:   · Maintain a healthy weight    Ask your healthcare provider how much you should weigh  Ask him to help you create a weight loss plan if you are overweight  This will help improve your blood flow  · Eat a variety of healthy foods  Healthy foods include fruits, vegetables, whole-grain breads, low-fat dairy products, beans, lean meats, and fish  You may need to eat foods that are low in salt to help decrease swelling in your legs  · Exercise regularly  Ask your healthcare provider about the best exercise plan for you  Exercise improves the blood flow in your legs  Follow up with your healthcare provider as directed:  Write down your questions so you remember to ask them during your visits  © Copyright [address] Information is for End User's use only and may not be sold, redistributed or otherwise used for commercial purposes  All illustrations and images included in CareNotes® are the copyrighted property of A D A M , Inc  or [facility] Bennie Reyna   The above information is an  only  It is not intended as medical advice for individual conditions or treatments  Talk to your doctor, nurse or pharmacist before following any medical regimen to see if it is safe and effective for you  none

## 2023-01-25 ENCOUNTER — OFFICE VISIT (OUTPATIENT)
Dept: WOUND CARE | Facility: HOSPITAL | Age: 63
End: 2023-01-25

## 2023-01-25 VITALS
HEART RATE: 70 BPM | RESPIRATION RATE: 12 BRPM | DIASTOLIC BLOOD PRESSURE: 74 MMHG | SYSTOLIC BLOOD PRESSURE: 167 MMHG | TEMPERATURE: 97.1 F

## 2023-01-25 DIAGNOSIS — L97.919 CHRONIC VENOUS HYPERTENSION (IDIOPATHIC) WITH ULCER AND INFLAMMATION OF RIGHT LOWER EXTREMITY (HCC): Primary | ICD-10-CM

## 2023-01-25 DIAGNOSIS — I87.331 CHRONIC VENOUS HYPERTENSION (IDIOPATHIC) WITH ULCER AND INFLAMMATION OF RIGHT LOWER EXTREMITY (HCC): Primary | ICD-10-CM

## 2023-01-25 RX ORDER — LIDOCAINE 40 MG/G
CREAM TOPICAL ONCE
Status: COMPLETED | OUTPATIENT
Start: 2023-01-25 | End: 2023-01-25

## 2023-01-25 RX ADMIN — LIDOCAINE 1 APPLICATION: 40 CREAM TOPICAL at 10:23

## 2023-01-26 NOTE — PROGRESS NOTES
Patient ID: Jovani Sullivan  is a 65 y o  male Date of Birth 1960     Chief Complaint  Chief Complaint   Patient presents with   • Follow Up Wound Care Visit     RLE wound       Allergies  Patient has no known allergies  Assessment:    No problem-specific Assessment & Plan notes found for this encounter  Diagnoses and all orders for this visit:    Chronic venous hypertension (idiopathic) with ulcer and inflammation of right lower extremity (HCC)  -     lidocaine (LMX) 4 % cream  -     Wound cleansing and dressings; Future  -     Wound compression and edema control; Future              Procedures    Plan:  Wound is closed  Moisturize daily  Long-term compression  Follow-up here in the wound management center as needed or call with questions or concerns    Wound 01/13/23 Venous Ulcer Leg Right (Active)   Wound Image Images linked 01/25/23 0956   Wound Description Dry;Eschar 01/25/23 1002   Vaishnavi-wound Assessment Dry 01/25/23 1002   Wound Length (cm) 0 cm 01/25/23 1002   Wound Width (cm) 0 cm 01/25/23 1002   Wound Depth (cm) 0 cm 01/25/23 1002   Wound Surface Area (cm^2) 0 cm^2 01/25/23 1002   Wound Volume (cm^3) 0 cm^3 01/25/23 1002   Calculated Wound Volume (cm^3) 0 cm^3 01/25/23 1002   Change in Wound Size % 100 01/25/23 1002   Drainage Amount None 01/25/23 1002   Non-staged Wound Description Not applicable 51/72/91 1250   Patient Tolerance Tolerated well 01/25/23 1002   Dressing Status Intact 01/25/23 1002       Wound 01/13/23 Venous Ulcer Leg Right (Active)   Date First Assessed/Time First Assessed: 01/13/23 0938   Pre-Existing Wound: Yes  Primary Wound Type: Venous Ulcer  Location: Leg  Wound Location Orientation: Right  Wound Description (Comments): MULTIPLE OPEN AREAS  Wound Outcome: Healed       Subjective:        Patient presents for follow-up of right lower extremity venous ulcer  No significant pain or drainage    Has been using a dry dressing on the area and Tubigrip for compression  The following portions of the patient's history were reviewed and updated as appropriate:   He  has a past medical history of COVID-19 virus infection (1/7/2021), Diabetes mellitus ([place] [address] ), History of COVID-19 (5/11/2021), History of gastroesophageal reflux (GERD), Hypertension, Sleep apnea, and Tinnitus (10/31/2019)  He   Patient Active Problem List    Diagnosis Date Noted   • Bronchitis 12/19/2022   • Annual physical exam 12/19/2022   • Arthritis of both knees 10/06/2021   • Diabetic polyneuropathy associated with type 2 diabetes mellitus ([place] [address] ) 05/11/2021   • Chronic bilateral low back pain    • Venous insufficiency 03/26/2019   • Periodic limb movement sleep disorder 07/11/2017   • Obstructive sleep apnea 06/01/2016   • Type 2 diabetes mellitus without complication, without long-term current use of insulin ([place] [address] ) 04/25/2016   • Vitamin D deficiency 04/25/2016   • Hypercholesterolemia 10/24/2013   • Benign essential hypertension 06/14/2012     He  reports that he quit smoking about 23 years ago  His smoking use included cigarettes  He has never used smokeless tobacco  He reports that he does not drink alcohol and does not use drugs    Current Outpatient Medications   Medication Sig Dispense Refill   • ammonium lactate (LAC-HYDRIN) 12 % lotion APPLY TOPICALLY TO THE AFFECTED AREA TWO TIMES A  g 4   • atorvastatin (LIPITOR) 40 mg tablet TAKE 1 TABLET BY MOUTH EVERY DAY 90 tablet 3   • carvedilol (COREG) 6 25 mg tablet Take 6 25 mg by mouth     • Cholecalciferol (Vitamin D3) 50 MCG (2000 UT) capsule TAKE 1 CAPSULE BY MOUTH EVERY DAY 90 capsule 3   • gabapentin (NEURONTIN) 300 mg capsule TAKE 1 CAPSULE BY MOUTH THREE TIMES A  capsule 3   • glucose blood (FREESTYLE LITE) test strip TEST ONCE DAILY 100 each 3   • Jardiance 10 MG TABS TAKE 1 TABLET BY MOUTH EVERY DAY IN THE MORNING 90 tablet 3   • lisinopril (ZESTRIL) 40 mg tablet TAKE 1 TABLET BY MOUTH EVERY DAY 90 tablet 3   • metFORMIN (GLUCOPHAGE) 1000 MG tablet TAKE 1 TABLET BY MOUTH TWICE A DAY WITH MEALS 180 tablet 3   • potassium chloride (K-DUR,KLOR-CON) 20 mEq tablet Take 40 mEq by mouth     • pramipexole (MIRAPEX) 0 125 mg tablet TAKE 1 TABLET BY MOUTH EVERY DAY 30 tablet 5   • torsemide (DEMADEX) 20 mg tablet 20 mg daily   1     No current facility-administered medications for this visit  He has No Known Allergies       Review of Systems   Constitutional: Negative for chills and fever  HENT: Negative for congestion and sneezing  Respiratory: Negative for cough  Cardiovascular: Positive for leg swelling  Skin: Positive for wound  Psychiatric/Behavioral: Negative for agitation  Objective:       Wound 01/13/23 Venous Ulcer Leg Right (Active)   Wound Image Images linked 01/25/23 0956   Wound Description Dry;Eschar 01/25/23 1002   Vaishnavi-wound Assessment Dry 01/25/23 1002   Wound Length (cm) 0 cm 01/25/23 1002   Wound Width (cm) 0 cm 01/25/23 1002   Wound Depth (cm) 0 cm 01/25/23 1002   Wound Surface Area (cm^2) 0 cm^2 01/25/23 1002   Wound Volume (cm^3) 0 cm^3 01/25/23 1002   Calculated Wound Volume (cm^3) 0 cm^3 01/25/23 1002   Change in Wound Size % 100 01/25/23 1002   Drainage Amount None 01/25/23 1002   Non-staged Wound Description Not applicable 47/86/55 2409   Patient Tolerance Tolerated well 01/25/23 1002   Dressing Status Intact 01/25/23 1002       /74   Pulse 70   Temp (!) 97 1 °F (36 2 °C)   Resp 12     Physical Exam  Vitals reviewed  Constitutional:       General: He is not in acute distress  Appearance: Normal appearance  He is obese  He is not ill-appearing, toxic-appearing or diaphoretic  HENT:      Head: Normocephalic and atraumatic  Right Ear: External ear normal       Left Ear: External ear normal    Eyes:      Conjunctiva/sclera: Conjunctivae normal    Pulmonary:      Effort: Pulmonary effort is normal  No respiratory distress  Musculoskeletal:      Cervical back: Neck supple  Right lower leg: Edema present  Left lower leg: Edema present  Skin:     Comments: See wound assessment, wound appears closed   Neurological:      Mental Status: He is alert  Psychiatric:         Mood and Affect: Mood normal          Behavior: Behavior normal             Wound 01/13/23 Venous Ulcer Leg Right (Active)   Wound Image   01/25/23 0956   Wound Description Dry;Eschar 01/25/23 1002   Vaishnavi-wound Assessment Dry 01/25/23 1002   Wound Length (cm) 0 cm 01/25/23 1002   Wound Width (cm) 0 cm 01/25/23 1002   Wound Depth (cm) 0 cm 01/25/23 1002   Wound Surface Area (cm^2) 0 cm^2 01/25/23 1002   Wound Volume (cm^3) 0 cm^3 01/25/23 1002   Calculated Wound Volume (cm^3) 0 cm^3 01/25/23 1002   Change in Wound Size % 100 01/25/23 1002   Drainage Amount None 01/25/23 1002   Drainage Description Serosanguineous 01/17/23 1447   Non-staged Wound Description Not applicable 35/72/69 8970   Patient Tolerance Tolerated well 01/25/23 1002   Dressing Status Intact 01/25/23 1002                         Wound Instructions:  Orders Placed This Encounter   Procedures   • Wound cleansing and dressings     RLE wounds is healed today  Apply moisturizing cream ( not steroid cream) to the skin     No needed dressing  Apply compression stocking most time  You are discharged from wound management center today  Standing Status:   Future     Standing Expiration Date:   1/25/2024   • Wound compression and edema control     Elastic Tubular Stocking Size F to the right leg       Tubular elastic bandage: Apply from base of toes to behind the knee  Apply in AM, may remove for sleep      Avoid prolonged standing in one place      Elevate leg(s) above the level of the heart when sitting or as much as possible       You may wear your own compression stockings      Wear your compression sock on the left leg  Standing Status:   Future     Standing Expiration Date:   1/25/2024        Diagnosis ICD-10-CM Associated Orders   1  Chronic venous hypertension (idiopathic) with ulcer and inflammation of right lower extremity (HCC)  I87 331 lidocaine (LMX) 4 % cream    N64 406 Wound cleansing and dressings     Wound compression and edema control

## 2023-02-08 LAB
LEFT EYE DIABETIC RETINOPATHY: NORMAL
RIGHT EYE DIABETIC RETINOPATHY: NORMAL

## 2023-02-23 ENCOUNTER — TELEPHONE (OUTPATIENT)
Dept: INTERNAL MEDICINE CLINIC | Facility: CLINIC | Age: 63
End: 2023-02-23

## 2023-02-23 NOTE — TELEPHONE ENCOUNTER
Patient called and would like a script for Diabetic shoes   And if possible can the script be sent to [address] [place] [address] there number is [phone]    Patient is requesting a call to confirm if script has been approved and sent to [address] [place] [address]

## 2023-02-23 NOTE — TELEPHONE ENCOUNTER
Patient sees podiatry, would they be able to complete a script as they are treating his foot conditions?

## 2023-02-27 NOTE — TELEPHONE ENCOUNTER
Patient had diabetic shoe script placed on 7/11/22 by podiatry  Will have podiatry place a new order and have attending sign this afternoon

## 2023-02-28 DIAGNOSIS — E55.9 VITAMIN D DEFICIENCY: ICD-10-CM

## 2023-02-28 DIAGNOSIS — E11.21 CONTROLLED TYPE 2 DIABETES MELLITUS WITH DIABETIC NEPHROPATHY, WITHOUT LONG-TERM CURRENT USE OF INSULIN (HCC): ICD-10-CM

## 2023-03-01 RX ORDER — ACETAMINOPHEN 160 MG
TABLET,DISINTEGRATING ORAL
Qty: 90 CAPSULE | Refills: 3 | Status: SHIPPED | OUTPATIENT
Start: 2023-03-01

## 2023-03-01 RX ORDER — ATORVASTATIN CALCIUM 40 MG/1
TABLET, FILM COATED ORAL
Qty: 90 TABLET | Refills: 3 | Status: SHIPPED | OUTPATIENT
Start: 2023-03-01

## 2023-03-17 NOTE — TELEPHONE ENCOUNTER
[facility] calling asking status of script for diabetic shoes and inserts   Please fax to [phone]

## 2023-03-22 ENCOUNTER — TELEPHONE (OUTPATIENT)
Dept: INTERNAL MEDICINE CLINIC | Facility: CLINIC | Age: 63
End: 2023-03-22

## 2023-03-22 DIAGNOSIS — E11.21 CONTROLLED TYPE 2 DIABETES MELLITUS WITH DIABETIC NEPHROPATHY, WITHOUT LONG-TERM CURRENT USE OF INSULIN (HCC): Primary | ICD-10-CM

## 2023-03-22 NOTE — TELEPHONE ENCOUNTER
Orders signed by Dr Kay Alcantara and faxed to Janette Shepard at [phone], confirmation received

## 2023-03-22 NOTE — TELEPHONE ENCOUNTER
Ida- from [facility] needed for a New order to be sent for his Diabetic shoes and Diabetic Shoe Inserts  Order needs to be signed by a MD or DO not by a DPM  If there's any question please call [facility] at [phone]     Patient has an appt with [facility] on 4/12/23  the orders would need to be sent before then

## 2023-03-22 NOTE — TELEPHONE ENCOUNTER
Can you place new order for diabetic shoes and diabetic shoe inserts as [facility] will not accept the order from Podiatry

## 2023-04-07 DIAGNOSIS — G47.61 PERIODIC LIMB MOVEMENT SLEEP DISORDER: ICD-10-CM

## 2023-04-07 DIAGNOSIS — E11.9 TYPE 2 DIABETES MELLITUS WITHOUT COMPLICATION, WITHOUT LONG-TERM CURRENT USE OF INSULIN (HCC): ICD-10-CM

## 2023-04-07 RX ORDER — GABAPENTIN 300 MG/1
CAPSULE ORAL
Qty: 270 CAPSULE | Refills: 3 | Status: SHIPPED | OUTPATIENT
Start: 2023-04-07

## 2023-04-26 DIAGNOSIS — G47.61 PERIODIC LIMB MOVEMENTS OF SLEEP: ICD-10-CM

## 2023-04-26 DIAGNOSIS — E11.9 TYPE 2 DIABETES MELLITUS WITHOUT COMPLICATION, WITHOUT LONG-TERM CURRENT USE OF INSULIN (HCC): ICD-10-CM

## 2023-04-26 DIAGNOSIS — I10 BENIGN ESSENTIAL HYPERTENSION: ICD-10-CM

## 2023-04-27 RX ORDER — LISINOPRIL 40 MG/1
TABLET ORAL
Qty: 90 TABLET | Refills: 3 | Status: SHIPPED | OUTPATIENT
Start: 2023-04-27

## 2023-04-27 RX ORDER — EMPAGLIFLOZIN 10 MG/1
TABLET, FILM COATED ORAL
Qty: 90 TABLET | Refills: 3 | Status: SHIPPED | OUTPATIENT
Start: 2023-04-27

## 2023-04-28 RX ORDER — PRAMIPEXOLE DIHYDROCHLORIDE 0.12 MG/1
TABLET ORAL
Qty: 30 TABLET | Refills: 5 | Status: SHIPPED | OUTPATIENT
Start: 2023-04-28

## 2023-05-30 NOTE — TELEPHONE ENCOUNTER
Please advise once OV notes are signed to send with pain management referral.   Polly () came to me for clinical advice , due to the fact that patient called in informing us that he is having leg swelling  I asked patient how long he has had the leg swelling for patient states it has been for 2 weeks but it gradually has gotten worse, swelling is from foot up to the knee bilateral , although L is worse than the R   Patient denies :    -chest pain  -sob  -dizziness  -blurred vision  -numbness  -trauma to the area      I spoke with Ana Peraza LPN and she suggested patient go to an URGENT CARE due to us not having any availability  Patient agreed and states he will go today to [address] [place] [address]

## 2023-06-05 ENCOUNTER — TELEPHONE (OUTPATIENT)
Dept: INTERNAL MEDICINE CLINIC | Facility: CLINIC | Age: 63
End: 2023-06-05

## 2023-06-27 NOTE — PATIENT INSTRUCTIONS
Britt is here today for   Chief Complaint   Patient presents with   • Headache     Headache last week /  dizzyness chronic but worse since march and mostly with position change ( see triage note from today )    .        Medication Refills needed today?  NO,   if you receive a prescription today would you like it to be sent to [facility]? NO    Medications: medications verified and updated    Patient would like communication of their results via:    Phone or card    Tobacco history: verified       Health Maintenance Summary     Shingles Vaccine (2 of 3)  Overdue since 1/3/2014    Depression Screening (Yearly)  Due soon on 11/1/2023    DTaP/Tdap/Td Vaccine (2 - Td or Tdap)  Next due on 9/25/2027    Pneumococcal Vaccine 65+   Completed    Influenza Vaccine   Completed    COVID-19 Vaccine   Completed    Meningococcal Vaccine   Aged Out    Hepatitis B Vaccine (For Physician/APC Discussion)   Aged Out    HPV Vaccine   Aged Out          Patient is due for topics as listed above but is not proceeding with Immunization(s) Shingles at this time.    COVID-19 Vaccine Interest Assessment:   • Documented in epic.  If the patient reports an outside immunization, please update the WIR/ICARE information in the Immunizations activity          Orders Placed This Encounter   Procedures    Wound cleansing and dressings     RLE wounds is healed today  Apply moisturizing cream ( not steroid cream) to the skin     No needed dressing  Apply compression stocking most time  You are discharged from wound management center today  Standing Status:   Future     Standing Expiration Date:   1/25/2024    Wound compression and edema control     Elastic Tubular Stocking Size F to the right leg  Tubular elastic bandage: Apply from base of toes to behind the knee  Apply in AM, may remove for sleep  Avoid prolonged standing in one place  Elevate leg(s) above the level of the heart when sitting or as much as possible  You may wear your own compression stockings  Wear your compression sock on the left leg       Standing Status:   Future     Standing Expiration Date:   1/25/2024

## 2023-06-30 ENCOUNTER — OFFICE VISIT (OUTPATIENT)
Dept: INTERNAL MEDICINE CLINIC | Facility: CLINIC | Age: 63
End: 2023-06-30

## 2023-06-30 VITALS
DIASTOLIC BLOOD PRESSURE: 82 MMHG | SYSTOLIC BLOOD PRESSURE: 133 MMHG | TEMPERATURE: 98.1 F | BODY MASS INDEX: 47.43 KG/M2 | WEIGHT: 285 LBS | HEART RATE: 93 BPM | OXYGEN SATURATION: 95 %

## 2023-06-30 DIAGNOSIS — E11.9 TYPE 2 DIABETES MELLITUS WITHOUT COMPLICATION, WITHOUT LONG-TERM CURRENT USE OF INSULIN (HCC): Primary | ICD-10-CM

## 2023-06-30 DIAGNOSIS — I10 BENIGN ESSENTIAL HYPERTENSION: ICD-10-CM

## 2023-06-30 DIAGNOSIS — Z12.11 SCREENING FOR MALIGNANT NEOPLASM OF COLON: ICD-10-CM

## 2023-06-30 DIAGNOSIS — E78.00 HYPERCHOLESTEROLEMIA: ICD-10-CM

## 2023-06-30 PROBLEM — Z00.00 ANNUAL PHYSICAL EXAM: Status: RESOLVED | Noted: 2022-12-19 | Resolved: 2023-06-30

## 2023-06-30 PROBLEM — J40 BRONCHITIS: Status: RESOLVED | Noted: 2022-12-19 | Resolved: 2023-06-30

## 2023-06-30 LAB — SL AMB POCT HEMOGLOBIN AIC: 7.7 (ref ?–6.5)

## 2023-06-30 PROCEDURE — 99214 OFFICE O/P EST MOD 30 MIN: CPT | Performed by: PHYSICIAN ASSISTANT

## 2023-06-30 PROCEDURE — 83036 HEMOGLOBIN GLYCOSYLATED A1C: CPT | Performed by: PHYSICIAN ASSISTANT

## 2023-06-30 NOTE — PROGRESS NOTES
Name: Bisi Fung. : 1960      MRN: [id number]  Encounter Provider: Fareed Drake PA-C  Encounter Date: 2023   Encounter department: [address] [place] [address]    Assessment & Plan     1. Type 2 diabetes mellitus without complication, without long-term current use of insulin (HCC)  Assessment & Plan:    Lab Results   Component Value Date    HGBA1C 7.7 (A) 2023     A1c improved from 8.2. Patient refuses any medication changes at this time. Patient states he will continue to work on lifestyle changes. Nutrition recommendations include decreasing portion sizes, encouraging healthy choices of fruits and vegetables, decreasing fast food intake, consuming healthier snacks, limiting drinks that contain sugar, moderation in carbohydrate intake, increasing intake of lean protein, reducing intake of saturated and trans fat and reducing intake of cholesterol. Exercise recommendations include moderate physical activity 150 minutes/week, exercising 3-5 times per week and strength training exercises. Continue metformin 1000 mg BID. Continue Jardiance 10 mg daily. Continue checking sugar 1-2 times daily. Report any hypoglycemic events or symptoms. Up to date on eye exam. Refused foot exam today. Follow up in 3 months, sooner if needed. Orders:  -     POCT hemoglobin A1c    2. Benign essential hypertension  Assessment & Plan:  BP Readings from Last 3 Encounters:   23 133/82   23 167/74   23 152/72     Continue carvedilol 6.25 mg daily and lisinopril 40 mg daily. Continue torsemide 20 mg daily. Avoid alcohol and caffeine. Limit sodium and salt intake. Orders:  -     Compression Stocking    3.  Hypercholesterolemia  Assessment & Plan:  Lab Results   Component Value Date    CHOLESTEROL 128 2023    CHOLESTEROL 97 2021    CHOLESTEROL 103 10/27/2020     Lab Results   Component Value Date    HDL 44 2023    HDL 36 (L) 2021    HDL 41 10/27/2020     Lab Results   Component Value Date    TRIG 156 (H) 01/02/2023    TRIG 156 (H) 04/20/2021    TRIG 151 (H) 10/27/2020     Lab Results   Component Value Date    [address] 10/27/2020     Lab Results   Component Value Date    LDLCALC 53 01/02/2023     Controlled. Continue atorvastatin 40 mg daily. Low fat diet. 4. Screening for malignant neoplasm of colon  Assessment & Plan:  Due for colorectal cancer screening. Patient agreeable, referral placed. Orders:  -     Ambulatory Referral to Gastroenterology; Future    5. BMI 45.0-49.9, adult ([facility])    BMI Counseling: Body mass index is 47.43 kg/m². The BMI is above normal. Nutrition recommendations include decreasing portion sizes, encouraging healthy choices of fruits and vegetables, decreasing fast food intake, consuming healthier snacks, limiting drinks that contain sugar, moderation in carbohydrate intake, increasing intake of lean protein, reducing intake of saturated and trans fat and reducing intake of cholesterol. Exercise recommendations include moderate physical activity 150 minutes/week, exercising 3-5 times per week and strength training exercises. No pharmacotherapy was ordered. Rationale for BMI follow-up plan is due to patient being overweight or obese. Depression Screening and Follow-up Plan: Patient was screened for depression during today's encounter. They screened negative with a PHQ-2 score of 0. Subjective      Patient is a 59year old male with a PMH of T2DM, HTN, PVD, and IGNACIA presenting for diabetes follow up. He states he has been compliant with his medications. He tries to eat well and stay active. He does check his sugar daily, but did not bring his journal. He has no concerns at this time. Review of Systems   Constitutional: Negative for appetite change, chills, diaphoresis, fatigue, fever and unexpected weight change. Eyes: Negative for visual disturbance.    Respiratory: Negative for cough, chest tightness, shortness of breath and wheezing. Cardiovascular: Negative for chest pain, palpitations and leg swelling. Gastrointestinal: Negative for abdominal pain, blood in stool, constipation, diarrhea, nausea and vomiting. Endocrine: Negative for cold intolerance, heat intolerance, polydipsia, polyphagia and polyuria. Musculoskeletal: Negative for arthralgias and myalgias. Skin: Negative for rash. Neurological: Negative for dizziness, tremors, weakness, light-headedness, numbness and headaches. Hematological: Negative for adenopathy. Psychiatric/Behavioral: Negative for dysphoric mood, self-injury, sleep disturbance and suicidal ideas. The patient is not nervous/anxious. Current Outpatient Medications on File Prior to Visit   Medication Sig   • ammonium lactate (LAC-HYDRIN) 12 % lotion APPLY TOPICALLY TO THE AFFECTED AREA TWO TIMES A DAY   • atorvastatin (LIPITOR) 40 mg tablet TAKE 1 TABLET BY MOUTH EVERY DAY   • carvedilol (COREG) 6.25 mg tablet Take 6.25 mg by mouth   • Cholecalciferol (Vitamin D3) 50 MCG (2000 UT) capsule TAKE 1 CAPSULE BY MOUTH EVERY DAY   • gabapentin (NEURONTIN) 300 mg capsule TAKE 1 CAPSULE BY MOUTH THREE TIMES A DAY   • glucose blood (FREESTYLE LITE) test strip TEST ONCE DAILY   • Jardiance 10 MG TABS tablet TAKE 1 TABLET BY MOUTH EVERY DAY IN THE MORNING**NEEDS PA   • lisinopril (ZESTRIL) 40 mg tablet TAKE 1 TABLET BY MOUTH EVERY DAY   • metFORMIN (GLUCOPHAGE) 1000 MG tablet TAKE 1 TABLET BY MOUTH TWICE A DAY WITH MEALS   • potassium chloride (K-DUR,KLOR-CON) 20 mEq tablet Take 40 mEq by mouth   • pramipexole (MIRAPEX) 0.125 mg tablet TAKE 1 TABLET BY MOUTH EVERY DAY   • torsemide (DEMADEX) 20 mg tablet 20 mg daily        Objective     /82 (BP Location: Right arm, Patient Position: Sitting, Cuff Size: Standard)   Pulse 93   Temp 98.1 °F (36.7 °C) (Temporal)   Wt 129 kg (285 lb)   SpO2 95%   BMI 47.43 kg/m²     Physical Exam  Vitals and nursing note reviewed. Constitutional:       General: He is awake. He is not in acute distress. Appearance: Normal appearance. He is well-developed and well-groomed. He is morbidly obese. He is not ill-appearing. HENT:      Head: Normocephalic and atraumatic. Eyes:      General: No scleral icterus. Conjunctiva/sclera: Conjunctivae normal.   Cardiovascular:      Rate and Rhythm: Normal rate. Pulses: Normal pulses. Heart sounds: Normal heart sounds. No murmur heard. Pulmonary:      Effort: Pulmonary effort is normal. No respiratory distress. Breath sounds: Normal breath sounds. No wheezing, rhonchi or rales. Abdominal:      General: Abdomen is flat. Bowel sounds are normal. There is no distension. Palpations: Abdomen is soft. There is no mass. Tenderness: There is no abdominal tenderness. There is no right CVA tenderness, left CVA tenderness, guarding or rebound. Hernia: No hernia is present. Musculoskeletal:         General: Normal range of motion. Cervical back: Neck supple. Right lower leg: Edema present. Left lower leg: Edema present. Lymphadenopathy:      Cervical: No cervical adenopathy. Skin:     General: Skin is warm. Coloration: Skin is not jaundiced. Findings: No rash. Neurological:      General: No focal deficit present. Mental Status: He is alert and oriented to person, place, and time. Mental status is at baseline. Gait: Gait is intact. Psychiatric:         Attention and Perception: Attention normal.         Mood and Affect: Mood and affect normal.         Speech: Speech normal.         Behavior: Behavior normal. Behavior is cooperative.          Cognition and Memory: Cognition normal.       Herminia Coe PA-C

## 2023-07-02 PROBLEM — Z12.11 SCREENING FOR MALIGNANT NEOPLASM OF COLON: Status: ACTIVE | Noted: 2023-07-02

## 2023-07-03 NOTE — ASSESSMENT & PLAN NOTE
Lab Results   Component Value Date    CHOLESTEROL 128 01/02/2023    CHOLESTEROL 97 04/20/2021    CHOLESTEROL 103 10/27/2020     Lab Results   Component Value Date    HDL 44 01/02/2023    HDL 36 (L) 04/20/2021    HDL 41 10/27/2020     Lab Results   Component Value Date    TRIG 156 (H) 01/02/2023    TRIG 156 (H) 04/20/2021    TRIG 151 (H) 10/27/2020     Lab Results   Component Value Date    [address] [facility] [address] 10/27/2020     Lab Results   Component Value Date    LDLCALC 53 01/02/2023     Controlled. Continue atorvastatin 40 mg daily. Low fat diet.

## 2023-07-03 NOTE — ASSESSMENT & PLAN NOTE
BP Readings from Last 3 Encounters:   06/30/23 133/82   01/25/23 167/74   01/17/23 152/72     Continue carvedilol 6.25 mg daily and lisinopril 40 mg daily. Continue torsemide 20 mg daily. Avoid alcohol and caffeine. Limit sodium and salt intake.

## 2023-07-03 NOTE — ASSESSMENT & PLAN NOTE
Lab Results   Component Value Date    HGBA1C 7.7 (A) 06/30/2023     A1c improved from 8.2. Patient refuses any medication changes at this time. Patient states he will continue to work on lifestyle changes. Nutrition recommendations include decreasing portion sizes, encouraging healthy choices of fruits and vegetables, decreasing fast food intake, consuming healthier snacks, limiting drinks that contain sugar, moderation in carbohydrate intake, increasing intake of lean protein, reducing intake of saturated and trans fat and reducing intake of cholesterol. Exercise recommendations include moderate physical activity 150 minutes/week, exercising 3-5 times per week and strength training exercises. Continue metformin 1000 mg BID. Continue Jardiance 10 mg daily. Continue checking sugar 1-2 times daily. Report any hypoglycemic events or symptoms. Up to date on eye exam. Refused foot exam today. Follow up in 3 months, sooner if needed.

## 2023-07-19 NOTE — MISCELLANEOUS
Reason For Visit  Reason For Visit Free Text Note Form: F/U phone call      Active Problems    1  Benign essential hypertension (401 1) (I10)   2  Hypercholesterolemia (272 0) (E78 0)   3  Impaired fasting glucose (790 21) (R73 01)   4  History of Need for vaccination for DTP (V06 1) (Z23)   5  Obesity (278 00) (E66 9)   6  Sleep disturbance (780 50) (G47 9)    Current Meds   1  Lisinopril-Hydrochlorothiazide 20-12 5 MG Oral Tablet; TAKE 1 TABLET DAILY; Therapy: [id number] to (Evaluate:[id number])  Requested for: 55POJ6237; Last   Rx:[id number] Ordered   2  Lovastatin 10 MG Oral Tablet; Take 1 tablet by mouth at bedtime; Therapy: [id number] to (Evaluate:[id number])  Requested for: 03FUK5364; Last   Rx:[id number] Ordered    Allergies    1  No Known Drug Allergies    2  No Known Environmental Allergies   3  No Known Food Allergies    Discussion/Summary  Discussion Summary:   Returned call to Mr Luis Miguel Sanderson who pt had previously given  permission to speak with to confirm that his letter of support, copies of pt's ID card and SS card has been re-forwarded to [facility] this date  SW spoke with Katerina Wilkins [phone] X  of [facility] who confirms they have received same  SW has also requested that pt /Mr Luis Miguel Sanderson return any additional info requested by DPW to [facility] directly as this will help hep receive decision and hopefully approval in the quickest way possible  Sw does remain available to assist as indicated        Signatures   Electronically signed by : Tika Weller LCSW; Mar 31 2016  9:53AM EST                       (Author) Name band;

## 2023-08-03 ENCOUNTER — TELEPHONE (OUTPATIENT)
Dept: INTERNAL MEDICINE CLINIC | Facility: CLINIC | Age: 63
End: 2023-08-03

## 2023-08-03 DIAGNOSIS — I87.2 VENOUS INSUFFICIENCY: Primary | ICD-10-CM

## 2023-08-03 NOTE — TELEPHONE ENCOUNTER
Received fax from [address] [place] [address]   . Recent compression stocking order was received. Please update Rx to include the following:  Gradient Compression Stockings   + Compression  level __-__ mmhg  - 15-20 Light  - 20-30 Medium  - 8-15/30-40 they do not supply    Scanned document into media for reference.

## 2023-08-09 ENCOUNTER — APPOINTMENT (OUTPATIENT)
Dept: LAB | Facility: CLINIC | Age: 63
End: 2023-08-09
Payer: MEDICARE

## 2023-08-09 ENCOUNTER — TRANSCRIBE ORDERS (OUTPATIENT)
Dept: LAB | Facility: CLINIC | Age: 63
End: 2023-08-09

## 2023-08-09 DIAGNOSIS — E78.2 MIXED HYPERLIPIDEMIA: Primary | ICD-10-CM

## 2023-08-09 DIAGNOSIS — E78.2 MIXED HYPERLIPIDEMIA: ICD-10-CM

## 2023-08-09 DIAGNOSIS — M79.89 LEG SWELLING: ICD-10-CM

## 2023-08-09 LAB
ALBUMIN SERPL BCP-MCNC: 3.7 G/DL (ref 3.5–5)
ALP SERPL-CCNC: 104 U/L (ref 46–116)
ALT SERPL W P-5'-P-CCNC: 30 U/L (ref 12–78)
ANION GAP SERPL CALCULATED.3IONS-SCNC: 6 MMOL/L
AST SERPL W P-5'-P-CCNC: 15 U/L (ref 5–45)
BILIRUB SERPL-MCNC: 0.55 MG/DL (ref 0.2–1)
BUN SERPL-MCNC: 11 MG/DL (ref 5–25)
CALCIUM SERPL-MCNC: 9.5 MG/DL (ref 8.3–10.1)
CHLORIDE SERPL-SCNC: 103 MMOL/L (ref 96–108)
CHOLEST SERPL-MCNC: 122 MG/DL
CO2 SERPL-SCNC: 31 MMOL/L (ref 21–32)
CREAT SERPL-MCNC: 0.88 MG/DL (ref 0.6–1.3)
GFR SERPL CREATININE-BSD FRML MDRD: 92 ML/MIN/1.73SQ M
GLUCOSE P FAST SERPL-MCNC: 141 MG/DL (ref 65–99)
HDLC SERPL-MCNC: 43 MG/DL
LDLC SERPL CALC-MCNC: 36 MG/DL (ref 0–100)
NONHDLC SERPL-MCNC: 79 MG/DL
POTASSIUM SERPL-SCNC: 4 MMOL/L (ref 3.5–5.3)
PROT SERPL-MCNC: 8.3 G/DL (ref 6.4–8.4)
SODIUM SERPL-SCNC: 140 MMOL/L (ref 135–147)
TRIGL SERPL-MCNC: 214 MG/DL

## 2023-08-09 PROCEDURE — 36415 COLL VENOUS BLD VENIPUNCTURE: CPT

## 2023-08-09 PROCEDURE — 80053 COMPREHEN METABOLIC PANEL: CPT

## 2023-08-09 PROCEDURE — 80061 LIPID PANEL: CPT

## 2023-08-31 PROBLEM — Z12.11 SCREENING FOR MALIGNANT NEOPLASM OF COLON: Status: RESOLVED | Noted: 2023-07-02 | Resolved: 2023-08-31

## 2023-09-29 DIAGNOSIS — G47.61 PERIODIC LIMB MOVEMENTS OF SLEEP: ICD-10-CM

## 2023-09-29 RX ORDER — PRAMIPEXOLE DIHYDROCHLORIDE 0.12 MG/1
TABLET ORAL
Qty: 30 TABLET | Refills: 5 | Status: SHIPPED | OUTPATIENT
Start: 2023-09-29

## 2023-10-18 DIAGNOSIS — L85.3 XEROSIS CUTIS: ICD-10-CM

## 2023-10-18 RX ORDER — AMMONIUM LACTATE 12 G/100G
LOTION TOPICAL
Qty: 400 G | Refills: 4 | Status: SHIPPED | OUTPATIENT
Start: 2023-10-18 | End: 2023-10-19 | Stop reason: SDUPTHER

## 2023-10-19 DIAGNOSIS — L85.3 XEROSIS CUTIS: ICD-10-CM

## 2023-10-19 RX ORDER — AMMONIUM LACTATE 12 G/100G
LOTION TOPICAL
Qty: 400 G | Refills: 5 | Status: SHIPPED | OUTPATIENT
Start: 2023-10-19

## 2024-03-27 DIAGNOSIS — E55.9 VITAMIN D DEFICIENCY: ICD-10-CM

## 2024-03-27 RX ORDER — ACETAMINOPHEN 160 MG
TABLET,DISINTEGRATING ORAL
Qty: 90 CAPSULE | Refills: 3 | Status: SHIPPED | OUTPATIENT
Start: 2024-03-27

## 2024-04-05 DIAGNOSIS — G47.61 PERIODIC LIMB MOVEMENTS OF SLEEP: ICD-10-CM

## 2024-04-08 DIAGNOSIS — E11.9 TYPE 2 DIABETES MELLITUS WITHOUT COMPLICATION, WITHOUT LONG-TERM CURRENT USE OF INSULIN (HCC): ICD-10-CM

## 2024-04-08 DIAGNOSIS — G47.61 PERIODIC LIMB MOVEMENT SLEEP DISORDER: ICD-10-CM

## 2024-04-08 RX ORDER — GABAPENTIN 300 MG/1
CAPSULE ORAL
Qty: 270 CAPSULE | Refills: 3 | Status: SHIPPED | OUTPATIENT
Start: 2024-04-08

## 2024-04-28 DIAGNOSIS — E11.21 CONTROLLED TYPE 2 DIABETES MELLITUS WITH DIABETIC NEPHROPATHY, WITHOUT LONG-TERM CURRENT USE OF INSULIN (HCC): ICD-10-CM

## 2024-04-29 RX ORDER — ATORVASTATIN CALCIUM 40 MG/1
TABLET, FILM COATED ORAL
Qty: 90 TABLET | Refills: 3 | Status: SHIPPED | OUTPATIENT
Start: 2024-04-29

## 2024-05-17 ENCOUNTER — OFFICE VISIT (OUTPATIENT)
Dept: INTERNAL MEDICINE CLINIC | Facility: CLINIC | Age: 64
End: 2024-05-17

## 2024-05-17 VITALS
HEART RATE: 87 BPM | OXYGEN SATURATION: 95 % | WEIGHT: 291.25 LBS | RESPIRATION RATE: 18 BRPM | BODY MASS INDEX: 48.53 KG/M2 | HEIGHT: 65 IN | DIASTOLIC BLOOD PRESSURE: 76 MMHG | TEMPERATURE: 97.6 F | SYSTOLIC BLOOD PRESSURE: 126 MMHG

## 2024-05-17 DIAGNOSIS — Z00.00 ANNUAL PHYSICAL EXAM: Primary | ICD-10-CM

## 2024-05-17 DIAGNOSIS — I10 BENIGN ESSENTIAL HYPERTENSION: ICD-10-CM

## 2024-05-17 DIAGNOSIS — Z12.5 PROSTATE CANCER SCREENING: ICD-10-CM

## 2024-05-17 DIAGNOSIS — E11.42 TYPE 2 DIABETES MELLITUS WITH DIABETIC POLYNEUROPATHY, WITHOUT LONG-TERM CURRENT USE OF INSULIN (HCC): ICD-10-CM

## 2024-05-17 DIAGNOSIS — E66.01 CLASS 3 SEVERE OBESITY DUE TO EXCESS CALORIES WITH SERIOUS COMORBIDITY AND BODY MASS INDEX (BMI) OF 45.0 TO 49.9 IN ADULT (HCC): ICD-10-CM

## 2024-05-17 DIAGNOSIS — Z12.12 ENCOUNTER FOR COLORECTAL CANCER SCREENING: ICD-10-CM

## 2024-05-17 DIAGNOSIS — E78.00 HYPERCHOLESTEROLEMIA: ICD-10-CM

## 2024-05-17 DIAGNOSIS — G47.33 OBSTRUCTIVE SLEEP APNEA: ICD-10-CM

## 2024-05-17 DIAGNOSIS — Z12.11 ENCOUNTER FOR COLORECTAL CANCER SCREENING: ICD-10-CM

## 2024-05-17 DIAGNOSIS — E55.9 VITAMIN D DEFICIENCY: ICD-10-CM

## 2024-05-17 DIAGNOSIS — G47.61 PERIODIC LIMB MOVEMENTS OF SLEEP: ICD-10-CM

## 2024-05-17 PROBLEM — E11.21 CONTROLLED TYPE 2 DIABETES MELLITUS WITH DIABETIC NEPHROPATHY, WITHOUT LONG-TERM CURRENT USE OF INSULIN (HCC): Status: RESOLVED | Noted: 2023-03-22 | Resolved: 2024-05-17

## 2024-05-17 PROCEDURE — 99214 OFFICE O/P EST MOD 30 MIN: CPT | Performed by: PHYSICIAN ASSISTANT

## 2024-05-17 PROCEDURE — 83036 HEMOGLOBIN GLYCOSYLATED A1C: CPT | Performed by: PHYSICIAN ASSISTANT

## 2024-05-17 PROCEDURE — 99396 PREV VISIT EST AGE 40-64: CPT | Performed by: PHYSICIAN ASSISTANT

## 2024-05-17 RX ORDER — ATORVASTATIN CALCIUM 40 MG/1
40 TABLET, FILM COATED ORAL DAILY
Qty: 90 TABLET | Refills: 3 | Status: SHIPPED | OUTPATIENT
Start: 2024-05-17 | End: 2024-05-17

## 2024-05-17 RX ORDER — LISINOPRIL 40 MG/1
40 TABLET ORAL DAILY
Qty: 90 TABLET | Refills: 3 | Status: SHIPPED | OUTPATIENT
Start: 2024-05-17

## 2024-05-17 RX ORDER — PRAMIPEXOLE DIHYDROCHLORIDE 0.12 MG/1
TABLET ORAL
Qty: 30 TABLET | Refills: 5 | OUTPATIENT
Start: 2024-05-17

## 2024-05-17 RX ORDER — GABAPENTIN 300 MG/1
300 CAPSULE ORAL 3 TIMES DAILY
Qty: 270 CAPSULE | Refills: 3 | Status: SHIPPED | OUTPATIENT
Start: 2024-05-17

## 2024-05-17 RX ORDER — ATORVASTATIN CALCIUM 40 MG/1
40 TABLET, FILM COATED ORAL DAILY
Qty: 90 TABLET | Refills: 3 | Status: SHIPPED | OUTPATIENT
Start: 2024-05-17

## 2024-05-17 RX ORDER — PRAMIPEXOLE DIHYDROCHLORIDE 0.12 MG/1
0.12 TABLET ORAL DAILY
Qty: 90 TABLET | Refills: 3 | Status: SHIPPED | OUTPATIENT
Start: 2024-05-17

## 2024-05-17 NOTE — PATIENT INSTRUCTIONS

## 2024-05-17 NOTE — PROGRESS NOTES
Adult Annual Physical  Name: Christopher Godwin Jr.      : 1960      MRN: [id number]  Encounter Provider: Jazmín Madera PA-C  Encounter Date: 2024   Encounter department: [facility]    Assessment & Plan   1. Annual physical exam  Assessment & Plan:  Discussed general health recommendations and screening guidelines. Agreeable to referral for colorectal cancer screening (colonoscopy). Agreeable to prostate cancer screening with PSA. Recommend routine dental and eye exams. Declines all vaccinations. Next physical one year.     2. Type 2 diabetes mellitus with diabetic polyneuropathy, without long-term current use of insulin (HCC)  Assessment & Plan:    Lab Results   Component Value Date    HGBA1C 8.8 (A) 2024     A1c worsened, was 7.7%. Goal less than 7.0%. He refuses insulin or anything with needles. Continue metformin 1000 mg BID. Will increase Jardiance from 10 to 25 mg once daily. He was agreeable. Patient states he will continue to work on lifestyle changes. Nutrition recommendations include decreasing portion sizes, encouraging healthy choices of fruits and vegetables, decreasing fast food intake, consuming healthier snacks, limiting drinks that contain sugar, moderation in carbohydrate intake, increasing intake of lean protein, reducing intake of saturated and trans fat and reducing intake of cholesterol. Exercise recommendations include moderate physical activity 150 minutes/week, exercising 3-5 times per week and strength training exercises. Continue metformin 1000 mg BID. Continue Jardiance 10 mg daily. He does not want to check his sugar at home. Up to date on eye exam. Refused foot exam today. Agreeable to schedule with podiatry. Follow up in 3 months, sooner if needed.    Orders:  -     POCT hemoglobin A1c  -     Ambulatory Referral to Podiatry; Future  -     Empagliflozin (Jardiance) 25 MG TABS; Take 1 tablet (25 mg total) by mouth daily  -     metFORMIN  (GLUCOPHAGE) 1000 MG tablet; Take 1 tablet (1,000 mg total) by mouth 2 (two) times a day with meals  -     gabapentin (NEURONTIN) 300 mg capsule; Take 1 capsule (300 mg total) by mouth 3 (three) times a day  -     Comprehensive metabolic panel; Future  -     Albumin / creatinine urine ratio; Future  3. Benign essential hypertension  Assessment & Plan:  BP Readings from Last 3 Encounters:   05/17/24 126/76   06/30/23 133/82   01/25/23 167/74      Continue carvedilol 6.25 mg daily and lisinopril 40 mg daily. Continue torsemide 20 mg daily. Avoid alcohol and caffeine. Limit sodium and salt intake.   Orders:  -     lisinopril (ZESTRIL) 40 mg tablet; Take 1 tablet (40 mg total) by mouth daily  -     CBC and differential; Future  -     TSH, 3rd generation with Free T4 reflex; Future  4. Hypercholesterolemia  Assessment & Plan:  Lab Results   Component Value Date    CHOLESTEROL 122 08/09/2023    CHOLESTEROL 128 01/02/2023    CHOLESTEROL 97 04/20/2021     Lab Results   Component Value Date    HDL 43 08/09/2023    HDL 44 01/02/2023    HDL 36 (L) 04/20/2021     Lab Results   Component Value Date    TRIG 214 (H) 08/09/2023    TRIG 156 (H) 01/02/2023    TRIG 156 (H) 04/20/2021     Lab Results   Component Value Date    NONHDLC 79 08/09/2023    NONHDLC 62 10/27/2020      Lab Results   Component Value Date    LDLCALC 36 08/09/2023      Controlled. Continue atorvastatin 40 mg daily. Low fat diet.   Orders:  -     Lipid panel; Future  -     atorvastatin (LIPITOR) 40 mg tablet; Take 1 tablet (40 mg total) by mouth daily  5. Obstructive sleep apnea  Assessment & Plan:  Compliant with CPAP use.   6. Periodic limb movements of sleep  Assessment & Plan:  Doing well. Continue pramipexole 0.125 mg at bedtime.   Orders:  -     pramipexole (MIRAPEX) 0.125 mg tablet; Take 1 tablet (0.125 mg total) by mouth daily  7. Vitamin D deficiency  Assessment & Plan:  History of vitamin D deficiency. Will recheck level.   Orders:  -     Vitamin D 25  hydroxy; Future  8. Prostate cancer screening  -     PSA, Total Screen; Future  9. Encounter for colorectal cancer screening  -     Ambulatory Referral to Gastroenterology; Future  10. Class 3 severe obesity due to excess calories with serious comorbidity and body mass index (BMI) of 45.0 to 49.9 in adult (HCC)    Immunizations and preventive care screenings were discussed with patient today. Appropriate education was printed on patient's after visit summary.    Discussed risks and benefits of prostate cancer screening. We discussed the controversial history of PSA screening for prostate cancer in the [place] as well as the risk of over detection and over treatment of prostate cancer by way of PSA screening.  The patient understands that PSA blood testing is an imperfect way to screen for prostate cancer and that elevated PSA levels in the blood may also be caused by infection, inflammation, prostatic trauma or manipulation, urological procedures, or by benign prostatic enlargement.    The role of the digital rectal examination in prostate cancer screening was also discussed and I discussed with him that there is large interobserver variability in the findings of digital rectal examination.    Counseling:  Alcohol/drug use: discussed moderation in alcohol intake, the recommendations for healthy alcohol use, and avoidance of illicit drug use.  Dental Health: discussed importance of regular tooth brushing, flossing, and dental visits.  Injury prevention: discussed safety/seat belts, safety helmets, smoke detectors, carbon dioxide detectors, and smoking near bedding or upholstery.  Sexual health: discussed sexually transmitted diseases, partner selection, use of condoms, avoidance of unintended pregnancy, and contraceptive alternatives.  Exercise: the importance of regular exercise/physical activity was discussed. Recommend exercise 3-5 times per week for at least 30 minutes.     BMI Counseling: Body mass index  is 48.47 kg/m². The BMI is above normal. Nutrition recommendations include decreasing portion sizes, encouraging healthy choices of fruits and vegetables, decreasing fast food intake, consuming healthier snacks, limiting drinks that contain sugar, moderation in carbohydrate intake, increasing intake of lean protein, reducing intake of saturated and trans fat and reducing intake of cholesterol. Exercise recommendations include moderate physical activity 150 minutes/week, exercising 3-5 times per week and strength training exercises. No pharmacotherapy was ordered. Rationale for BMI follow-up plan is due to patient being overweight or obese.     Depression Screening and Follow-up Plan: Patient was screened for depression during today's encounter. They screened negative with a PHQ-2 score of 0.        History of Present Illness     Adult Annual Physical:  Patient presents for annual physical. States he has been adherent with his medications. Denies any medication side effects. States he feels well overall. He is not currently checking his sugar..     Diet and Physical Activity:  - Diet/Nutrition: well balanced diet, limited junk food, low fat diet, diabetic diet and consuming 3-5 servings of fruits/vegetables daily.  - Exercise: walking, 5-7 times a week on average and 30-60 minutes on average.    Depression Screening:  - PHQ-2 Score: 0    General Health:  - Sleep: sleeps well and 7-8 hours of sleep on average.  - Hearing: normal hearing bilateral ears.  - Vision: no vision problems and goes for regular eye exams.  - Dental: regular dental visits and brushes teeth twice daily.     Health:  - History of STDs: no.   - Urinary symptoms: none.     Advanced Care Planning:  - Has an advanced directive?: no    - Has a durable medical POA?: no    - ACP document given to patient?: yes      Review of Systems   Constitutional:  Negative for appetite change, chills, diaphoresis, fatigue, fever and unexpected weight change.    Eyes:  Negative for visual disturbance.   Respiratory:  Negative for cough, chest tightness, shortness of breath and wheezing.    Cardiovascular:  Negative for chest pain, palpitations and leg swelling.   Gastrointestinal:  Negative for abdominal pain, blood in stool, constipation, diarrhea, nausea and vomiting.   Endocrine: Negative for cold intolerance, heat intolerance, polydipsia, polyphagia and polyuria.   Musculoskeletal:  Negative for arthralgias and myalgias.   Skin:  Negative for rash.   Neurological:  Negative for dizziness, tremors, weakness, light-headedness, numbness and headaches.   Hematological:  Negative for adenopathy.   Psychiatric/Behavioral:  Negative for dysphoric mood, self-injury, sleep disturbance and suicidal ideas. The patient is not nervous/anxious.      Past Medical History   Past Medical History:   Diagnosis Date    COVID-19 virus infection 1/7/2021    Diabetes mellitus (HCC)     History of COVID-19 5/11/2021    History of gastroesophageal reflux (GERD)     Hypertension     Last assessed: 10/24/13    Sleep apnea     Tinnitus 10/31/2019     Past Surgical History:   Procedure Laterality Date    AMPUTATION      of Finger, with Neurectomy and flaps     Family History   Problem Relation Age of Onset    Uterine cancer Mother     Diabetes Father     Vascular Disease Father         peripheral    Bone cancer Sister     Other Brother         severe head trauma     Current Outpatient Medications on File Prior to Visit   Medication Sig Dispense Refill    ammonium lactate (LAC-HYDRIN) 12 % lotion APPLY TOPICALLY TO THE AFFECTED AREA TWO TIMES A  g 5    carvedilol (COREG) 6.25 mg tablet Take 6.25 mg by mouth 2 (two) times a day with meals      glucose blood (FREESTYLE LITE) test strip TEST ONCE DAILY 100 each 3    potassium chloride (K-DUR,KLOR-CON) 20 mEq tablet Take 40 mEq by mouth      torsemide (DEMADEX) 20 mg tablet 20 mg daily   1    Vitamin D3 50 MCG (2000 UT) capsule TAKE 1 CAPSULE  "BY MOUTH EVERY DAY 90 capsule 3     No current facility-administered medications on file prior to visit.   No Known Allergies   Current Outpatient Medications on File Prior to Visit   Medication Sig Dispense Refill    ammonium lactate (LAC-HYDRIN) 12 % lotion APPLY TOPICALLY TO THE AFFECTED AREA TWO TIMES A  g 5    carvedilol (COREG) 6.25 mg tablet Take 6.25 mg by mouth 2 (two) times a day with meals      glucose blood (FREESTYLE LITE) test strip TEST ONCE DAILY 100 each 3    potassium chloride (K-DUR,KLOR-CON) 20 mEq tablet Take 40 mEq by mouth      torsemide (DEMADEX) 20 mg tablet 20 mg daily   1    Vitamin D3 50 MCG (2000) capsule TAKE 1 CAPSULE BY MOUTH EVERY DAY 90 capsule 3     No current facility-administered medications on file prior to visit.      Social History     Tobacco Use    Smoking status: Former     Current packs/day: 0.00     Types: Cigarettes     Quit date:      Years since quittin.4    Smokeless tobacco: Never   Vaping Use    Vaping status: Never Used   Substance and Sexual Activity    Alcohol use: Never    Drug use: Never    Sexual activity: Yes     Partners: Female       Objective     /76 (BP Location: Left arm, Patient Position: Sitting, Cuff Size: Large)   Pulse 87   Temp 97.6 °F (36.4 °C) (Temporal)   Resp 18   Ht 5' 5\" (1.651 m)   Wt 132 kg (291 lb 4 oz)   SpO2 95%   BMI 48.47 kg/m²     Physical Exam  Vitals and nursing note reviewed.   Constitutional:       General: He is awake. He is not in acute distress.     Appearance: Normal appearance. He is well-developed and well-groomed. He is morbidly obese. He is not ill-appearing.   HENT:      Head: Normocephalic and atraumatic.      Right Ear: Tympanic membrane, ear canal and external ear normal.      Left Ear: Tympanic membrane, ear canal and external ear normal.      Nose: Nose normal.      Mouth/Throat:      Lips: Pink.      Mouth: Mucous membranes are moist.      Pharynx: Oropharynx is clear. Uvula midline. " No oropharyngeal exudate or posterior oropharyngeal erythema.   Eyes:      General: Lids are normal. No scleral icterus.     Extraocular Movements: Extraocular movements intact.      Conjunctiva/sclera: Conjunctivae normal.      Pupils: Pupils are equal, round, and reactive to light.   Neck:      Vascular: No carotid bruit, hepatojugular reflux or JVD.   Cardiovascular:      Rate and Rhythm: Normal rate and regular rhythm.      Pulses: Normal pulses.           Radial pulses are 2+ on the right side and 2+ on the left side.      Heart sounds: Normal heart sounds. No murmur heard.  Pulmonary:      Effort: Pulmonary effort is normal. No respiratory distress.      Breath sounds: Normal breath sounds and air entry. No decreased air movement. No decreased breath sounds, wheezing, rhonchi or rales.   Abdominal:      General: Abdomen is flat. Bowel sounds are normal. There is no distension.      Palpations: Abdomen is soft. There is no mass.      Tenderness: There is no abdominal tenderness. There is no guarding or rebound.      Hernia: No hernia is present.   Musculoskeletal:         General: Normal range of motion.      Cervical back: Neck supple.      Right lower leg: Edema present.      Left lower leg: Edema present.   Lymphadenopathy:      Cervical: No cervical adenopathy.   Skin:     General: Skin is warm.      Coloration: Skin is not jaundiced.      Findings: No rash.   Neurological:      General: No focal deficit present.      Mental Status: He is alert and oriented to person, place, and time. Mental status is at baseline.      Motor: Motor function is intact.      Coordination: Coordination is intact.      Gait: Gait is intact.   Psychiatric:         Attention and Perception: Attention normal.         Mood and Affect: Mood and affect normal.         Speech: Speech normal.         Behavior: Behavior normal. Behavior is cooperative.       Administrative Statements   I have spent a total time of 30 minutes on 05/20/24  In caring for this patient including Risks and benefits of tx options, Instructions for management, Patient and family education, Importance of tx compliance, Risk factor reductions, Reviewing / ordering tests, medicine, procedures  , and Obtaining or reviewing history  .

## 2024-05-20 PROBLEM — E11.42 DIABETIC POLYNEUROPATHY ASSOCIATED WITH TYPE 2 DIABETES MELLITUS (HCC): Status: RESOLVED | Noted: 2021-05-11 | Resolved: 2024-05-20

## 2024-05-20 LAB — SL AMB POCT HEMOGLOBIN AIC: 8.8 (ref ?–6.5)

## 2024-05-20 NOTE — ASSESSMENT & PLAN NOTE
Lab Results   Component Value Date    CHOLESTEROL 122 08/09/2023    CHOLESTEROL 128 01/02/2023    CHOLESTEROL 97 04/20/2021     Lab Results   Component Value Date    HDL 43 08/09/2023    HDL 44 01/02/2023    HDL 36 (L) 04/20/2021     Lab Results   Component Value Date    TRIG 214 (H) 08/09/2023    TRIG 156 (H) 01/02/2023    TRIG 156 (H) 04/20/2021     Lab Results   Component Value Date    NONHDLC 79 08/09/2023    NONHDLC 62 10/27/2020      Lab Results   Component Value Date    LDLCALC 36 08/09/2023      Controlled. Continue atorvastatin 40 mg daily. Low fat diet.

## 2024-05-20 NOTE — ASSESSMENT & PLAN NOTE
BP Readings from Last 3 Encounters:   05/17/24 126/76   06/30/23 133/82   01/25/23 167/74      Continue carvedilol 6.25 mg daily and lisinopril 40 mg daily. Continue torsemide 20 mg daily. Avoid alcohol and caffeine. Limit sodium and salt intake.

## 2024-05-20 NOTE — ASSESSMENT & PLAN NOTE
Discussed general health recommendations and screening guidelines. Agreeable to referral for colorectal cancer screening (colonoscopy). Agreeable to prostate cancer screening with PSA. Recommend routine dental and eye exams. Declines all vaccinations. Next physical one year.

## 2024-05-20 NOTE — ASSESSMENT & PLAN NOTE
Lab Results   Component Value Date    HGBA1C 8.8 (A) 05/20/2024     A1c worsened, was 7.7%. Goal less than 7.0%. He refuses insulin or anything with needles. Continue metformin 1000 mg BID. Will increase Jardiance from 10 to 25 mg once daily. He was agreeable. Patient states he will continue to work on lifestyle changes. Nutrition recommendations include decreasing portion sizes, encouraging healthy choices of fruits and vegetables, decreasing fast food intake, consuming healthier snacks, limiting drinks that contain sugar, moderation in carbohydrate intake, increasing intake of lean protein, reducing intake of saturated and trans fat and reducing intake of cholesterol. Exercise recommendations include moderate physical activity 150 minutes/week, exercising 3-5 times per week and strength training exercises. Continue metformin 1000 mg BID. Continue Jardiance 10 mg daily. He does not want to check his sugar at home. Up to date on eye exam. Refused foot exam today. Agreeable to schedule with podiatry. Follow up in 3 months, sooner if needed.

## 2024-07-01 ENCOUNTER — TELEPHONE (OUTPATIENT)
Dept: INTERNAL MEDICINE CLINIC | Facility: CLINIC | Age: 64
End: 2024-07-01

## 2024-07-01 NOTE — TELEPHONE ENCOUNTER
Received call from patient. Introduced self and role. Patient stated he completed a sleep study about 5 years ago and was noted to have sleep apnea. Patient received a new machine couple of weeks ago and stating that it is not working correctly. Patient was advised to contact PCP office.     Nurse discussed if patient knows [employer] company that delivered equipment. Patient stating it is not [employer]. Nurse encouraged patient to look for any documentation that should have been with equipment at time of delivery. Patient currently looking and provided [facility] [facility] [place] contact number per request.     All questions/concerns answered at this time

## 2024-07-08 ENCOUNTER — TELEPHONE (OUTPATIENT)
Dept: INTERNAL MEDICINE CLINIC | Facility: CLINIC | Age: 64
End: 2024-07-08

## 2024-07-08 NOTE — TELEPHONE ENCOUNTER
"1st attempt - LVM     \" Return in about 3 months (around 8/17/2024), or Recheck DM - 40 mins.\"          "

## 2024-07-08 NOTE — TELEPHONE ENCOUNTER
----- Message from Jazmín Madera PA-C sent at 7/8/2024  8:17 AM EDT -----  Please call patient to schedule diabetes follow up due 8/20 or after. Please also remind him to get his BW done. Thank you

## 2024-08-19 NOTE — TELEPHONE ENCOUNTER
Laureen Gomez from [place]  No answer, so I left a message  I informed her the last Hgb A1c for patient was done on 10/14/19 and it was 8 4%  I informed her patient has an upcoming appointment where the result will be discussed and at that time the provider will most likely order follow-up labs  Informed her to call the office with further questions  [FreeTextEntry1] : 1) pap/std cultures performed 2) pt with no contraindications for continued OCP usage; refills issued  Return to office in one year, sooner prn

## 2024-09-06 ENCOUNTER — TELEPHONE (OUTPATIENT)
Dept: INTERNAL MEDICINE CLINIC | Facility: CLINIC | Age: 64
End: 2024-09-06

## 2024-09-06 ENCOUNTER — OFFICE VISIT (OUTPATIENT)
Dept: INTERNAL MEDICINE CLINIC | Facility: CLINIC | Age: 64
End: 2024-09-06

## 2024-09-06 VITALS
WEIGHT: 294 LBS | DIASTOLIC BLOOD PRESSURE: 70 MMHG | BODY MASS INDEX: 48.98 KG/M2 | HEIGHT: 65 IN | TEMPERATURE: 97.4 F | SYSTOLIC BLOOD PRESSURE: 128 MMHG | HEART RATE: 86 BPM

## 2024-09-06 DIAGNOSIS — E11.42 TYPE 2 DIABETES MELLITUS WITH DIABETIC POLYNEUROPATHY, WITHOUT LONG-TERM CURRENT USE OF INSULIN (HCC): Primary | ICD-10-CM

## 2024-09-06 DIAGNOSIS — E55.9 VITAMIN D DEFICIENCY: ICD-10-CM

## 2024-09-06 DIAGNOSIS — E66.01 CLASS 3 SEVERE OBESITY DUE TO EXCESS CALORIES WITH SERIOUS COMORBIDITY AND BODY MASS INDEX (BMI) OF 45.0 TO 49.9 IN ADULT (HCC): ICD-10-CM

## 2024-09-06 DIAGNOSIS — E78.00 HYPERCHOLESTEROLEMIA: ICD-10-CM

## 2024-09-06 DIAGNOSIS — G47.33 OBSTRUCTIVE SLEEP APNEA: ICD-10-CM

## 2024-09-06 DIAGNOSIS — I87.2 VENOUS INSUFFICIENCY: ICD-10-CM

## 2024-09-06 DIAGNOSIS — I10 BENIGN ESSENTIAL HYPERTENSION: ICD-10-CM

## 2024-09-06 DIAGNOSIS — G47.61 PERIODIC LIMB MOVEMENTS OF SLEEP: ICD-10-CM

## 2024-09-06 PROBLEM — Z00.00 ANNUAL PHYSICAL EXAM: Status: RESOLVED | Noted: 2022-12-19 | Resolved: 2024-09-06

## 2024-09-06 LAB — SL AMB POCT HEMOGLOBIN AIC: 8.7 (ref ?–6.5)

## 2024-09-06 PROCEDURE — 99214 OFFICE O/P EST MOD 30 MIN: CPT | Performed by: PHYSICIAN ASSISTANT

## 2024-09-06 PROCEDURE — 83036 HEMOGLOBIN GLYCOSYLATED A1C: CPT | Performed by: PHYSICIAN ASSISTANT

## 2024-09-06 RX ORDER — METHYLSULFONYLMETHANE 500 MG
CAPSULE ORAL
COMMUNITY

## 2024-09-06 RX ORDER — MULTIVIT-MIN/IRON/FOLIC ACID/K 18-600-40
CAPSULE ORAL
COMMUNITY

## 2024-09-06 NOTE — ASSESSMENT & PLAN NOTE
Wears compression socks daily which helps, but current pairs have holes in them. New pair ordered.

## 2024-09-06 NOTE — ASSESSMENT & PLAN NOTE
BP Readings from Last 3 Encounters:   09/06/24 128/70   05/17/24 126/76   06/30/23 133/82      Continue carvedilol 6.25 mg daily and lisinopril 40 mg daily. Continue torsemide 20 mg daily. Avoid alcohol and caffeine. Limit sodium and salt intake.

## 2024-09-06 NOTE — ASSESSMENT & PLAN NOTE
Lab Results   Component Value Date    CHOLESTEROL 122 08/09/2023    CHOLESTEROL 128 01/02/2023    CHOLESTEROL 97 04/20/2021     Lab Results   Component Value Date    HDL 43 08/09/2023    HDL 44 01/02/2023    HDL 36 (L) 04/20/2021     Lab Results   Component Value Date    TRIG 214 (H) 08/09/2023    TRIG 156 (H) 01/02/2023    TRIG 156 (H) 04/20/2021     Lab Results   Component Value Date    NONHDLC 79 08/09/2023    NONHDLC 62 10/27/2020      Lab Results   Component Value Date    LDLCALC 36 08/09/2023      Controlled. Continue atorvastatin 40 mg daily. Low fat diet. Will recheck lipid panel. Order already in chart.

## 2024-09-06 NOTE — PROGRESS NOTES
Ambulatory Visit  Name: Christopher Godwin Jr.      : 1960      MRN: [id number]  Encounter Provider: Jazmín Madera PA-C  Encounter Date: 2024   Encounter department: [facility]    Assessment & Plan   1. Type 2 diabetes mellitus with diabetic polyneuropathy, without long-term current use of insulin (HCC)  Assessment & Plan:    Lab Results   Component Value Date    HGBA1C 8.7 (A) 2024     Slight improvement from 8.7%. Goal less than 7.0%. He refuses insulin or anything with needles. Continue metformin 1000 mg BID. Continue Jardiance 25 mg daily. He was agreeable. Patient states he will continue to work on lifestyle changes. Nutrition recommendations include decreasing portion sizes, encouraging healthy choices of fruits and vegetables, decreasing fast food intake, consuming healthier snacks, limiting drinks that contain sugar, moderation in carbohydrate intake, increasing intake of lean protein, reducing intake of saturated and trans fat and reducing intake of cholesterol. Exercise recommendations include moderate physical activity 150 minutes/week, exercising 3-5 times per week and strength training exercises. He does not want to check his sugar at home. Up to date on eye exam. Refused foot exam today. Agreeable to schedule with podiatry. Follow up in 3 months, sooner if needed.    Orders:  -     POCT hemoglobin A1c  -     Ambulatory Referral to Podiatry; Future  2. Benign essential hypertension  Assessment & Plan:  BP Readings from Last 3 Encounters:   24 128/70   24 126/76   23 133/82      Continue carvedilol 6.25 mg daily and lisinopril 40 mg daily. Continue torsemide 20 mg daily. Avoid alcohol and caffeine. Limit sodium and salt intake.   3. Hypercholesterolemia  Assessment & Plan:  Lab Results   Component Value Date    CHOLESTEROL 122 2023    CHOLESTEROL 128 2023    CHOLESTEROL 97 2021     Lab Results   Component Value Date     HDL 43 08/09/2023    HDL 44 01/02/2023    HDL 36 (L) 04/20/2021     Lab Results   Component Value Date    TRIG 214 (H) 08/09/2023    TRIG 156 (H) 01/02/2023    TRIG 156 (H) 04/20/2021     Lab Results   Component Value Date    NONHDLC 79 08/09/2023    NONHDLC 62 10/27/2020      Lab Results   Component Value Date    LDLCALC 36 08/09/2023      Controlled. Continue atorvastatin 40 mg daily. Low fat diet. Will recheck lipid panel. Order already in chart.   4. Obstructive sleep apnea  Assessment & Plan:  Compliant with CPAP use.   5. Vitamin D deficiency  Assessment & Plan:  History of vitamin D deficiency. Will recheck level.   6. Venous insufficiency  Assessment & Plan:  Wears compression socks daily which helps, but current pairs have holes in them. New pair ordered.   Orders:  -     Compression Stocking  7. Periodic limb movements of sleep  Assessment & Plan:  Doing well. Continue pramipexole 0.125 mg at bedtime.   8. Class 3 severe obesity due to excess calories with serious comorbidity and body mass index (BMI) of 45.0 to 49.9 in adult ([facility])    BMI Counseling: Body mass index is 48.92 kg/m². The BMI is above normal. Nutrition recommendations include decreasing portion sizes, encouraging healthy choices of fruits and vegetables, decreasing fast food intake, consuming healthier snacks, limiting drinks that contain sugar, moderation in carbohydrate intake, increasing intake of lean protein, reducing intake of saturated and trans fat and reducing intake of cholesterol. Exercise recommendations include moderate physical activity 150 minutes/week, exercising 3-5 times per week and strength training exercises. No pharmacotherapy was ordered. Rationale for BMI follow-up plan is due to patient being overweight or obese.       History of Present Illness     Patient is a 63 year old male with a PMH of T2DM, HTN, PVD, and IGNACIA presenting for diabetes follow up. He states he has been compliant with his medications. He tries to  eat well and stay active. He does check his sugar daily, but did not bring his journal. He has no concerns at this time.         Review of Systems   Constitutional:  Negative for appetite change, chills, diaphoresis, fatigue, fever and unexpected weight change.   Eyes:  Negative for visual disturbance.   Respiratory:  Negative for cough, chest tightness, shortness of breath and wheezing.    Cardiovascular:  Negative for chest pain, palpitations and leg swelling.   Gastrointestinal:  Negative for abdominal pain, blood in stool, constipation, diarrhea, nausea and vomiting.   Endocrine: Negative for cold intolerance, heat intolerance, polydipsia, polyphagia and polyuria.   Musculoskeletal:  Negative for arthralgias and myalgias.   Skin:  Negative for rash.   Neurological:  Negative for dizziness, tremors, weakness, light-headedness, numbness and headaches.   Hematological:  Negative for adenopathy.   Psychiatric/Behavioral:  Negative for dysphoric mood, self-injury, sleep disturbance and suicidal ideas. The patient is not nervous/anxious.      Past Medical History   Past Medical History:   Diagnosis Date    COVID-19 virus infection 1/7/2021    Diabetes mellitus (HCC)     History of COVID-19 5/11/2021    History of gastroesophageal reflux (GERD)     Hypertension     Last assessed: 10/24/13    Sleep apnea     Tinnitus 10/31/2019     Past Surgical History:   Procedure Laterality Date    AMPUTATION      of Finger, with Neurectomy and flaps     Family History   Problem Relation Age of Onset    Uterine cancer Mother     Diabetes Father     Vascular Disease Father         peripheral    Bone cancer Sister     Other Brother         severe head trauma     Current Outpatient Medications on File Prior to Visit   Medication Sig Dispense Refill    ammonium lactate (LAC-HYDRIN) 12 % lotion APPLY TOPICALLY TO THE AFFECTED AREA TWO TIMES A  g 5    Ascorbic Acid (Vitamin C) 500 MG CAPS Take by mouth      Methylsulfonylmethane  (MSM) 500 MG CAPS Take by mouth      atorvastatin (LIPITOR) 40 mg tablet Take 1 tablet (40 mg total) by mouth daily 90 tablet 3    carvedilol (COREG) 6.25 mg tablet Take 6.25 mg by mouth 2 (two) times a day with meals      Empagliflozin (Jardiance) 25 MG TABS Take 1 tablet (25 mg total) by mouth daily 90 tablet 3    gabapentin (NEURONTIN) 300 mg capsule Take 1 capsule (300 mg total) by mouth 3 (three) times a day 270 capsule 3    glucose blood (FREESTYLE LITE) test strip TEST ONCE DAILY 100 each 3    lisinopril (ZESTRIL) 40 mg tablet Take 1 tablet (40 mg total) by mouth daily 90 tablet 3    metFORMIN (GLUCOPHAGE) 1000 MG tablet Take 1 tablet (1,000 mg total) by mouth 2 (two) times a day with meals 180 tablet 3    potassium chloride (K-DUR,KLOR-CON) 20 mEq tablet Take 40 mEq by mouth      pramipexole (MIRAPEX) 0.125 mg tablet Take 1 tablet (0.125 mg total) by mouth daily 90 tablet 3    torsemide (DEMADEX) 20 mg tablet 20 mg daily   1    Vitamin D3 50 MCG (2000 UT) capsule TAKE 1 CAPSULE BY MOUTH EVERY DAY 90 capsule 3     No current facility-administered medications on file prior to visit.   No Known Allergies   Current Outpatient Medications on File Prior to Visit   Medication Sig Dispense Refill    ammonium lactate (LAC-HYDRIN) 12 % lotion APPLY TOPICALLY TO THE AFFECTED AREA TWO TIMES A  g 5    Ascorbic Acid (Vitamin C) 500 MG CAPS Take by mouth      Methylsulfonylmethane (MSM) 500 MG CAPS Take by mouth      atorvastatin (LIPITOR) 40 mg tablet Take 1 tablet (40 mg total) by mouth daily 90 tablet 3    carvedilol (COREG) 6.25 mg tablet Take 6.25 mg by mouth 2 (two) times a day with meals      Empagliflozin (Jardiance) 25 MG TABS Take 1 tablet (25 mg total) by mouth daily 90 tablet 3    gabapentin (NEURONTIN) 300 mg capsule Take 1 capsule (300 mg total) by mouth 3 (three) times a day 270 capsule 3    glucose blood (FREESTYLE LITE) test strip TEST ONCE DAILY 100 each 3    lisinopril (ZESTRIL) 40 mg tablet Take  "1 tablet (40 mg total) by mouth daily 90 tablet 3    metFORMIN (GLUCOPHAGE) 1000 MG tablet Take 1 tablet (1,000 mg total) by mouth 2 (two) times a day with meals 180 tablet 3    potassium chloride (K-DUR,KLOR-CON) 20 mEq tablet Take 40 mEq by mouth      pramipexole (MIRAPEX) 0.125 mg tablet Take 1 tablet (0.125 mg total) by mouth daily 90 tablet 3    torsemide (DEMADEX) 20 mg tablet 20 mg daily   1    Vitamin D3 50 MCG (2000) capsule TAKE 1 CAPSULE BY MOUTH EVERY DAY 90 capsule 3     No current facility-administered medications on file prior to visit.      Social History     Tobacco Use    Smoking status: Former     Current packs/day: 0.00     Types: Cigarettes     Quit date:      Years since quittin.6    Smokeless tobacco: Never   Vaping Use    Vaping status: Never Used   Substance and Sexual Activity    Alcohol use: Never    Drug use: Never    Sexual activity: Yes     Partners: Female     Objective     /70 (BP Location: Left arm, Patient Position: Sitting, Cuff Size: Large)   Pulse 86   Temp (!) 97.4 °F (36.3 °C) (Temporal)   Ht 5' 5\" (1.651 m)   Wt 133 kg (294 lb)   BMI 48.92 kg/m²     Physical Exam  Vitals and nursing note reviewed.   Constitutional:       General: He is awake. He is not in acute distress.     Appearance: Normal appearance. He is well-developed and well-groomed. He is morbidly obese. He is not ill-appearing.   HENT:      Head: Normocephalic and atraumatic.      Right Ear: Tympanic membrane, ear canal and external ear normal.      Left Ear: Tympanic membrane, ear canal and external ear normal.      Nose: Nose normal.      Mouth/Throat:      Lips: Pink.      Mouth: Mucous membranes are moist.      Pharynx: Oropharynx is clear. Uvula midline. No oropharyngeal exudate or posterior oropharyngeal erythema.   Eyes:      General: Lids are normal. No scleral icterus.     Extraocular Movements: Extraocular movements intact.      Conjunctiva/sclera: Conjunctivae normal.      Pupils: " Pupils are equal, round, and reactive to light.   Neck:      Vascular: No carotid bruit, hepatojugular reflux or JVD.   Cardiovascular:      Rate and Rhythm: Normal rate and regular rhythm.      Pulses: Normal pulses.           Radial pulses are 2+ on the right side and 2+ on the left side.      Heart sounds: Normal heart sounds. No murmur heard.     Comments: No pitting edema  Pulmonary:      Effort: Pulmonary effort is normal. No respiratory distress.      Breath sounds: Normal breath sounds and air entry. No decreased air movement. No decreased breath sounds, wheezing, rhonchi or rales.   Abdominal:      General: Abdomen is flat. Bowel sounds are normal. There is no distension.      Palpations: Abdomen is soft. There is no mass.      Tenderness: There is no abdominal tenderness. There is no guarding or rebound.      Hernia: No hernia is present.   Musculoskeletal:         General: Normal range of motion.      Cervical back: Neck supple.      Right lower leg: Edema present.      Left lower leg: Edema present.   Lymphadenopathy:      Cervical: No cervical adenopathy.   Skin:     General: Skin is warm.      Coloration: Skin is not jaundiced.      Findings: No rash.   Neurological:      General: No focal deficit present.      Mental Status: He is alert and oriented to person, place, and time. Mental status is at baseline.      Motor: Motor function is intact.      Coordination: Coordination is intact.      Gait: Gait is intact.   Psychiatric:         Attention and Perception: Attention normal.         Mood and Affect: Mood and affect normal.         Speech: Speech normal.         Behavior: Behavior normal. Behavior is cooperative.       Administrative Statements   I have spent a total time of 30 minutes in caring for this patient on the day of the visit/encounter including Diagnostic results, Prognosis, Risks and benefits of tx options, Instructions for management, Patient and family education, Importance of tx  compliance, Risk factor reductions, Impressions, Counseling / Coordination of care, Reviewing / ordering tests, medicine, procedures  , and Obtaining or reviewing history  .

## 2024-09-06 NOTE — ASSESSMENT & PLAN NOTE
Lab Results   Component Value Date    HGBA1C 8.7 (A) 09/06/2024     Slight improvement from 8.7%. Goal less than 7.0%. He refuses insulin or anything with needles. Continue metformin 1000 mg BID. Continue Jardiance 25 mg daily. He was agreeable. Patient states he will continue to work on lifestyle changes. Nutrition recommendations include decreasing portion sizes, encouraging healthy choices of fruits and vegetables, decreasing fast food intake, consuming healthier snacks, limiting drinks that contain sugar, moderation in carbohydrate intake, increasing intake of lean protein, reducing intake of saturated and trans fat and reducing intake of cholesterol. Exercise recommendations include moderate physical activity 150 minutes/week, exercising 3-5 times per week and strength training exercises. He does not want to check his sugar at home. Up to date on eye exam. Refused foot exam today. Agreeable to schedule with podiatry. Follow up in 3 months, sooner if needed.

## 2024-09-10 ENCOUNTER — LAB (OUTPATIENT)
Dept: LAB | Facility: CLINIC | Age: 64
End: 2024-09-10
Payer: MEDICARE

## 2024-09-10 DIAGNOSIS — E55.9 VITAMIN D DEFICIENCY: ICD-10-CM

## 2024-09-10 DIAGNOSIS — E78.00 HYPERCHOLESTEROLEMIA: ICD-10-CM

## 2024-09-10 DIAGNOSIS — I10 BENIGN ESSENTIAL HYPERTENSION: ICD-10-CM

## 2024-09-10 DIAGNOSIS — E11.42 TYPE 2 DIABETES MELLITUS WITH DIABETIC POLYNEUROPATHY, WITHOUT LONG-TERM CURRENT USE OF INSULIN (HCC): ICD-10-CM

## 2024-09-10 DIAGNOSIS — Z12.5 PROSTATE CANCER SCREENING: ICD-10-CM

## 2024-09-10 DIAGNOSIS — E55.9 VITAMIN D DEFICIENCY: Primary | ICD-10-CM

## 2024-09-10 LAB
25(OH)D3 SERPL-MCNC: 18.1 NG/ML (ref 30–100)
ALBUMIN SERPL BCG-MCNC: 4.2 G/DL (ref 3.5–5)
ALP SERPL-CCNC: 82 U/L (ref 34–104)
ALT SERPL W P-5'-P-CCNC: 32 U/L (ref 7–52)
ANION GAP SERPL CALCULATED.3IONS-SCNC: 8 MMOL/L (ref 4–13)
AST SERPL W P-5'-P-CCNC: 20 U/L (ref 13–39)
BASOPHILS # BLD AUTO: 0.02 THOUSANDS/ÂΜL (ref 0–0.1)
BASOPHILS NFR BLD AUTO: 0 % (ref 0–1)
BILIRUB SERPL-MCNC: 0.38 MG/DL (ref 0.2–1)
BUN SERPL-MCNC: 19 MG/DL (ref 5–25)
CALCIUM SERPL-MCNC: 9.2 MG/DL (ref 8.4–10.2)
CHLORIDE SERPL-SCNC: 101 MMOL/L (ref 96–108)
CHOLEST SERPL-MCNC: 106 MG/DL
CO2 SERPL-SCNC: 27 MMOL/L (ref 21–32)
CREAT SERPL-MCNC: 0.69 MG/DL (ref 0.6–1.3)
CREAT UR-MCNC: 50.5 MG/DL
EOSINOPHIL # BLD AUTO: 0.22 THOUSAND/ÂΜL (ref 0–0.61)
EOSINOPHIL NFR BLD AUTO: 3 % (ref 0–6)
ERYTHROCYTE [DISTWIDTH] IN BLOOD BY AUTOMATED COUNT: 15 % (ref 11.6–15.1)
GFR SERPL CREATININE-BSD FRML MDRD: 101 ML/MIN/1.73SQ M
GLUCOSE P FAST SERPL-MCNC: 173 MG/DL (ref 65–99)
HCT VFR BLD AUTO: 45.2 % (ref 36.5–49.3)
HDLC SERPL-MCNC: 30 MG/DL
HGB BLD-MCNC: 14.3 G/DL (ref 12–17)
IMM GRANULOCYTES # BLD AUTO: 0.03 THOUSAND/UL (ref 0–0.2)
IMM GRANULOCYTES NFR BLD AUTO: 0 % (ref 0–2)
LYMPHOCYTES # BLD AUTO: 2.1 THOUSANDS/ÂΜL (ref 0.6–4.47)
LYMPHOCYTES NFR BLD AUTO: 29 % (ref 14–44)
MCH RBC QN AUTO: 27.6 PG (ref 26.8–34.3)
MCHC RBC AUTO-ENTMCNC: 31.6 G/DL (ref 31.4–37.4)
MCV RBC AUTO: 87 FL (ref 82–98)
MICROALBUMIN UR-MCNC: <7 MG/L
MONOCYTES # BLD AUTO: 0.6 THOUSAND/ÂΜL (ref 0.17–1.22)
MONOCYTES NFR BLD AUTO: 8 % (ref 4–12)
NEUTROPHILS # BLD AUTO: 4.37 THOUSANDS/ÂΜL (ref 1.85–7.62)
NEUTS SEG NFR BLD AUTO: 60 % (ref 43–75)
NONHDLC SERPL-MCNC: 76 MG/DL
NRBC BLD AUTO-RTO: 0 /100 WBCS
PLATELET # BLD AUTO: 174 THOUSANDS/UL (ref 149–390)
PMV BLD AUTO: 9.3 FL (ref 8.9–12.7)
POTASSIUM SERPL-SCNC: 4.3 MMOL/L (ref 3.5–5.3)
PROT SERPL-MCNC: 7.1 G/DL (ref 6.4–8.4)
PSA SERPL-MCNC: 0.5 NG/ML (ref 0–4)
RBC # BLD AUTO: 5.19 MILLION/UL (ref 3.88–5.62)
SODIUM SERPL-SCNC: 136 MMOL/L (ref 135–147)
TRIGL SERPL-MCNC: 497 MG/DL
TSH SERPL DL<=0.05 MIU/L-ACNC: 3.91 UIU/ML (ref 0.45–4.5)
WBC # BLD AUTO: 7.34 THOUSAND/UL (ref 4.31–10.16)

## 2024-09-10 PROCEDURE — 82043 UR ALBUMIN QUANTITATIVE: CPT

## 2024-09-10 PROCEDURE — 85025 COMPLETE CBC W/AUTO DIFF WBC: CPT

## 2024-09-10 PROCEDURE — 84443 ASSAY THYROID STIM HORMONE: CPT

## 2024-09-10 PROCEDURE — 80061 LIPID PANEL: CPT

## 2024-09-10 PROCEDURE — 80053 COMPREHEN METABOLIC PANEL: CPT

## 2024-09-10 PROCEDURE — 82570 ASSAY OF URINE CREATININE: CPT

## 2024-09-10 PROCEDURE — G0103 PSA SCREENING: HCPCS

## 2024-09-10 PROCEDURE — 82306 VITAMIN D 25 HYDROXY: CPT

## 2024-09-10 PROCEDURE — 36415 COLL VENOUS BLD VENIPUNCTURE: CPT

## 2024-09-10 RX ORDER — ERGOCALCIFEROL 1.25 MG/1
50000 CAPSULE, LIQUID FILLED ORAL WEEKLY
Qty: 8 CAPSULE | Refills: 0 | Status: SHIPPED | OUTPATIENT
Start: 2024-09-10

## 2024-09-11 DIAGNOSIS — E78.00 HYPERCHOLESTEROLEMIA: ICD-10-CM

## 2024-09-11 RX ORDER — ATORVASTATIN CALCIUM 80 MG/1
80 TABLET, FILM COATED ORAL DAILY
Qty: 90 TABLET | Refills: 3 | Status: SHIPPED | OUTPATIENT
Start: 2024-09-11

## 2024-09-14 ENCOUNTER — NURSE TRIAGE (OUTPATIENT)
Dept: OTHER | Facility: OTHER | Age: 64
End: 2024-09-14

## 2024-09-14 NOTE — TELEPHONE ENCOUNTER
"Regarding: atorvastatin rx increased--pt unsure why  ----- Message from Laure BARRIENTOS sent at 9/14/2024  2:54 PM EDT -----  \"I picked up my atorvastatin today and it went from 40-80 mg and I don't know why. We did not discuss an increase and I've not been having any issues\"    Pt states he does not leave his phone on, he asked if a message could be left. I advised pt a nurse would like need to speak to him directly and he stated he would leave his phone on for 1 hour.    "

## 2024-09-16 NOTE — TELEPHONE ENCOUNTER
Called and discussed with patient that Jazmín tried reaching him on multiple occasions to discuss his labs and advise him on medication changes, but she ws unsuccessful. I went over all lab results with him and medication changes and patient verbalized understanding.

## 2024-09-20 ENCOUNTER — TELEPHONE (OUTPATIENT)
Dept: INTERNAL MEDICINE CLINIC | Facility: CLINIC | Age: 64
End: 2024-09-20

## 2024-09-30 ENCOUNTER — CONSULT (OUTPATIENT)
Dept: MULTI SPECIALTY CLINIC | Facility: CLINIC | Age: 64
End: 2024-09-30

## 2024-09-30 VITALS
BODY MASS INDEX: 48.76 KG/M2 | DIASTOLIC BLOOD PRESSURE: 86 MMHG | HEART RATE: 91 BPM | TEMPERATURE: 98 F | SYSTOLIC BLOOD PRESSURE: 149 MMHG | WEIGHT: 293 LBS

## 2024-09-30 DIAGNOSIS — B35.1 ONYCHOMYCOSIS: Primary | ICD-10-CM

## 2024-09-30 DIAGNOSIS — E11.42 TYPE 2 DIABETES MELLITUS WITH DIABETIC POLYNEUROPATHY, WITHOUT LONG-TERM CURRENT USE OF INSULIN (HCC): ICD-10-CM

## 2024-09-30 PROCEDURE — 99213 OFFICE O/P EST LOW 20 MIN: CPT | Performed by: PODIATRIST

## 2024-12-06 ENCOUNTER — OFFICE VISIT (OUTPATIENT)
Dept: INTERNAL MEDICINE CLINIC | Facility: CLINIC | Age: 64
End: 2024-12-06

## 2024-12-06 DIAGNOSIS — G47.33 OBSTRUCTIVE SLEEP APNEA: ICD-10-CM

## 2024-12-06 DIAGNOSIS — I87.2 VENOUS INSUFFICIENCY: ICD-10-CM

## 2024-12-06 DIAGNOSIS — E66.01 CLASS 3 SEVERE OBESITY DUE TO EXCESS CALORIES WITH SERIOUS COMORBIDITY AND BODY MASS INDEX (BMI) OF 45.0 TO 49.9 IN ADULT (HCC): ICD-10-CM

## 2024-12-06 DIAGNOSIS — E66.813 CLASS 3 SEVERE OBESITY DUE TO EXCESS CALORIES WITH SERIOUS COMORBIDITY AND BODY MASS INDEX (BMI) OF 45.0 TO 49.9 IN ADULT (HCC): ICD-10-CM

## 2024-12-06 DIAGNOSIS — G47.61 PERIODIC LIMB MOVEMENTS OF SLEEP: ICD-10-CM

## 2024-12-06 DIAGNOSIS — E78.00 HYPERCHOLESTEROLEMIA: ICD-10-CM

## 2024-12-06 DIAGNOSIS — E55.9 VITAMIN D DEFICIENCY: ICD-10-CM

## 2024-12-06 DIAGNOSIS — E11.42 TYPE 2 DIABETES MELLITUS WITH DIABETIC POLYNEUROPATHY, WITHOUT LONG-TERM CURRENT USE OF INSULIN (HCC): Primary | ICD-10-CM

## 2024-12-06 DIAGNOSIS — I10 BENIGN ESSENTIAL HYPERTENSION: ICD-10-CM

## 2024-12-06 LAB — SL AMB POCT HEMOGLOBIN AIC: 8.4 (ref ?–6.5)

## 2024-12-06 PROCEDURE — 99214 OFFICE O/P EST MOD 30 MIN: CPT | Performed by: PHYSICIAN ASSISTANT

## 2024-12-06 PROCEDURE — 83036 HEMOGLOBIN GLYCOSYLATED A1C: CPT | Performed by: PHYSICIAN ASSISTANT

## 2024-12-06 RX ORDER — FENOFIBRATE 145 MG/1
145 TABLET, COATED ORAL DAILY
COMMUNITY
Start: 2024-09-23 | End: 2025-09-23

## 2024-12-06 NOTE — PROGRESS NOTES
Name: Christopher Godwin Jr.      : 1960      MRN: [id number]  Encounter Provider: Jazmín Abel PA-C  Encounter Date: 2024   Encounter department: [facility] [place]  :  Assessment & Plan  Type 2 diabetes mellitus with diabetic polyneuropathy, without long-term current use of insulin (HCC)    Lab Results   Component Value Date    HGBA1C 8.4 (A) 2024     Slight improvement from 8.7%. Goal less than 7.0%. He refuses insulin or anything with needles. Continue metformin 1000 mg BID. Continue Jardiance 25 mg daily. Declines any medication changes at this time. He states he will work on improving his diet and he will continue to work on lifestyle changes. Nutrition recommendations include decreasing portion sizes, encouraging healthy choices of fruits and vegetables, decreasing fast food intake, consuming healthier snacks, limiting drinks that contain sugar, moderation in carbohydrate intake, increasing intake of lean protein, reducing intake of saturated and trans fat and reducing intake of cholesterol. Exercise recommendations include moderate physical activity 150 minutes/week, exercising 3-5 times per week and strength training exercises. He does not want to check his sugar at home. Up to date on eye exam. Foot exam today normal. Follow up in 3 months, sooner if needed.    Orders:    POCT hemoglobin A1c    Benign essential hypertension  BP Readings from Last 3 Encounters:   24 151/82   24 149/86   24 128/70      BP elevated today. He declines any medication changes. Continue carvedilol 6.25 mg daily and lisinopril 40 mg daily. Continue torsemide 20 mg daily. Avoid alcohol and caffeine. Limit sodium and salt intake. Monitor blood pressure daily at home and call office if continually elevated.        Hypercholesterolemia  Lab Results   Component Value Date    CHOLESTEROL 106 09/10/2024    CHOLESTEROL 122 2023    CHOLESTEROL 128 2023     Lab  Results   Component Value Date    HDL 30 (L) 09/10/2024    HDL 43 08/09/2023    HDL 44 01/02/2023     Lab Results   Component Value Date    TRIG 497 (H) 09/10/2024    TRIG 214 (H) 08/09/2023    TRIG 156 (H) 01/02/2023     Lab Results   Component Value Date    NONHDLC 76 09/10/2024    NONHDLC 79 08/09/2023    NONHDLC 62 10/27/2020      Lab Results   Component Value Date    LDLCALC  09/10/2024      Comment:      Calculated LDL invalid, triglycerides >400 mg/dl  This screening LDL is a calculated result.   It does not have the accuracy of the Direct Measured LDL in the monitoring of patients with hyperlipidemia and/or statin therapy.   Direct Measure LDL (LQD491) must be ordered separately in these patients.      Above goal. Continue atorvastatin 40 mg daily. Declines medication changes. He states he will work on improving his diet. Low fat diet.        Obstructive sleep apnea  Compliant with CPAP use.        Periodic limb movements of sleep  Doing well. Continue pramipexole 0.125 mg at bedtime.        Venous insufficiency  Wears compression socks daily which helps, but current pairs have holes in them. New pair ordered last visit.        Vitamin D deficiency  Continue 50,000 units once weekly.        Class 3 severe obesity due to excess calories with serious comorbidity and body mass index (BMI) of 45.0 to 49.9 in adult (HCC)           BMI Counseling: Body mass index is 48.59 kg/m². The BMI is above normal. Nutrition recommendations include decreasing portion sizes, encouraging healthy choices of fruits and vegetables, decreasing fast food intake, consuming healthier snacks, limiting drinks that contain sugar, moderation in carbohydrate intake, increasing intake of lean protein, reducing intake of saturated and trans fat and reducing intake of cholesterol. Exercise recommendations include moderate physical activity 150 minutes/week, exercising 3-5 times per week and strength training exercises. No pharmacotherapy was  ordered. Rationale for BMI follow-up plan is due to patient being overweight or obese.       History of Present Illness     Patient is a 63 year old male with a PMH of T2DM, HTN, PVD, and IGNACIA presenting for diabetes follow up. He states he has been compliant with his medications. He tries to eat well and stay active. He does check his sugar daily, but did not bring his journal. He has no concerns at this time.       Review of Systems   Constitutional:  Negative for appetite change, chills, diaphoresis, fatigue, fever and unexpected weight change.   Eyes:  Negative for visual disturbance.   Respiratory:  Negative for cough, chest tightness, shortness of breath and wheezing.    Cardiovascular:  Negative for chest pain, palpitations and leg swelling.   Gastrointestinal:  Negative for abdominal pain, blood in stool, constipation, diarrhea, nausea and vomiting.   Endocrine: Negative for cold intolerance, heat intolerance, polydipsia, polyphagia and polyuria.   Musculoskeletal:  Negative for arthralgias and myalgias.   Skin:  Negative for rash.   Neurological:  Negative for dizziness, tremors, weakness, light-headedness, numbness and headaches.   Hematological:  Negative for adenopathy.   Psychiatric/Behavioral:  Negative for dysphoric mood, self-injury, sleep disturbance and suicidal ideas. The patient is not nervous/anxious.      Past Medical History   Past Medical History:   Diagnosis Date    COVID-19 virus infection 1/7/2021    Diabetes mellitus (HCC)     History of COVID-19 5/11/2021    History of gastroesophageal reflux (GERD)     Hypertension     Last assessed: 10/24/13    Sleep apnea     Tinnitus 10/31/2019     Past Surgical History:   Procedure Laterality Date    AMPUTATION      of Finger, with Neurectomy and flaps     Family History   Problem Relation Age of Onset    Uterine cancer Mother     Diabetes Father     Vascular Disease Father         peripheral    Bone cancer Sister     Other Brother         severe  head trauma      reports that he quit smoking about 24 years ago. His smoking use included cigarettes. He has never used smokeless tobacco. He reports that he does not drink alcohol and does not use drugs.  Current Outpatient Medications on File Prior to Visit   Medication Sig Dispense Refill    ammonium lactate (LAC-HYDRIN) 12 % lotion APPLY TOPICALLY TO THE AFFECTED AREA TWO TIMES A  g 5    fenofibrate (TRICOR) 145 mg tablet Take 145 mg by mouth daily      Ascorbic Acid (Vitamin C) 500 MG CAPS Take by mouth      atorvastatin (LIPITOR) 80 mg tablet Take 1 tablet (80 mg total) by mouth daily 90 tablet 3    carvedilol (COREG) 6.25 mg tablet Take 6.25 mg by mouth 2 (two) times a day with meals      Empagliflozin (Jardiance) 25 MG TABS Take 1 tablet (25 mg total) by mouth daily 90 tablet 3    ergocalciferol (VITAMIN D2) 50,000 units Take 1 capsule (50,000 Units total) by mouth once a week 8 capsule 0    gabapentin (NEURONTIN) 300 mg capsule Take 1 capsule (300 mg total) by mouth 3 (three) times a day 270 capsule 3    glucose blood (FREESTYLE LITE) test strip TEST ONCE DAILY 100 each 3    lisinopril (ZESTRIL) 40 mg tablet Take 1 tablet (40 mg total) by mouth daily 90 tablet 3    metFORMIN (GLUCOPHAGE) 1000 MG tablet Take 1 tablet (1,000 mg total) by mouth 2 (two) times a day with meals 180 tablet 3    Methylsulfonylmethane (MSM) 500 MG CAPS Take by mouth      potassium chloride (K-DUR,KLOR-CON) 20 mEq tablet Take 40 mEq by mouth      pramipexole (MIRAPEX) 0.125 mg tablet Take 1 tablet (0.125 mg total) by mouth daily 90 tablet 3    torsemide (DEMADEX) 20 mg tablet 20 mg daily   1    Vitamin D3 50 MCG (2000 UT) capsule TAKE 1 CAPSULE BY MOUTH EVERY DAY 90 capsule 3     No current facility-administered medications on file prior to visit.   No Known Allergies   Current Outpatient Medications on File Prior to Visit   Medication Sig Dispense Refill    ammonium lactate (LAC-HYDRIN) 12 % lotion APPLY TOPICALLY TO THE  "AFFECTED AREA TWO TIMES A  g 5    fenofibrate (TRICOR) 145 mg tablet Take 145 mg by mouth daily      Ascorbic Acid (Vitamin C) 500 MG CAPS Take by mouth      atorvastatin (LIPITOR) 80 mg tablet Take 1 tablet (80 mg total) by mouth daily 90 tablet 3    carvedilol (COREG) 6.25 mg tablet Take 6.25 mg by mouth 2 (two) times a day with meals      Empagliflozin (Jardiance) 25 MG TABS Take 1 tablet (25 mg total) by mouth daily 90 tablet 3    ergocalciferol (VITAMIN D2) 50,000 units Take 1 capsule (50,000 Units total) by mouth once a week 8 capsule 0    gabapentin (NEURONTIN) 300 mg capsule Take 1 capsule (300 mg total) by mouth 3 (three) times a day 270 capsule 3    glucose blood (FREESTYLE LITE) test strip TEST ONCE DAILY 100 each 3    lisinopril (ZESTRIL) 40 mg tablet Take 1 tablet (40 mg total) by mouth daily 90 tablet 3    metFORMIN (GLUCOPHAGE) 1000 MG tablet Take 1 tablet (1,000 mg total) by mouth 2 (two) times a day with meals 180 tablet 3    Methylsulfonylmethane (MSM) 500 MG CAPS Take by mouth      potassium chloride (K-DUR,KLOR-CON) 20 mEq tablet Take 40 mEq by mouth      pramipexole (MIRAPEX) 0.125 mg tablet Take 1 tablet (0.125 mg total) by mouth daily 90 tablet 3    torsemide (DEMADEX) 20 mg tablet 20 mg daily   1    Vitamin D3 50 MCG (2000 UT) capsule TAKE 1 CAPSULE BY MOUTH EVERY DAY 90 capsule 3     No current facility-administered medications on file prior to visit.      Social History     Tobacco Use    Smoking status: Former     Current packs/day: 0.00     Types: Cigarettes     Quit date:      Years since quittin.9    Smokeless tobacco: Never   Vaping Use    Vaping status: Never Used   Substance and Sexual Activity    Alcohol use: Never    Drug use: Never    Sexual activity: Yes     Partners: Female        Objective   /82 (BP Location: Left arm, Patient Position: Sitting, Cuff Size: Large)   Pulse 82   Temp 98.1 °F (36.7 °C) (Temporal)   Ht 5' 5\" (1.651 m)   Wt 132 kg (292 lb)  "  BMI 48.59 kg/m²      Physical Exam  Vitals and nursing note reviewed.   Constitutional:       General: He is awake. He is not in acute distress.     Appearance: Normal appearance. He is well-developed and well-groomed. He is morbidly obese. He is not ill-appearing.   HENT:      Head: Normocephalic and atraumatic.      Right Ear: Tympanic membrane, ear canal and external ear normal.      Left Ear: Tympanic membrane, ear canal and external ear normal.      Nose: Nose normal.      Mouth/Throat:      Lips: Pink.      Mouth: Mucous membranes are moist.      Pharynx: Oropharynx is clear. Uvula midline. No oropharyngeal exudate or posterior oropharyngeal erythema.   Eyes:      General: Lids are normal. No scleral icterus.     Extraocular Movements: Extraocular movements intact.      Conjunctiva/sclera: Conjunctivae normal.      Pupils: Pupils are equal, round, and reactive to light.   Neck:      Vascular: No carotid bruit, hepatojugular reflux or JVD.   Cardiovascular:      Rate and Rhythm: Normal rate and regular rhythm.      Pulses: Normal pulses. no weak pulses.           Radial pulses are 2+ on the right side and 2+ on the left side.        Dorsalis pedis pulses are 2+ on the right side and 2+ on the left side.        Posterior tibial pulses are 2+ on the right side and 2+ on the left side.      Heart sounds: Normal heart sounds. No murmur heard.     Comments: No pitting edema  Pulmonary:      Effort: Pulmonary effort is normal. No respiratory distress.      Breath sounds: Normal breath sounds and air entry. No decreased air movement. No decreased breath sounds, wheezing, rhonchi or rales.   Abdominal:      General: Abdomen is flat. Bowel sounds are normal. There is no distension.      Palpations: Abdomen is soft. There is no mass.      Tenderness: There is no abdominal tenderness. There is no guarding or rebound.      Hernia: No hernia is present.   Musculoskeletal:         General: Normal range of motion.       Cervical back: Neck supple.      Right lower leg: Edema present.      Left lower leg: Edema present.   Feet:      Right foot:      Skin integrity: No ulcer, skin breakdown, erythema, warmth, callus or dry skin.      Left foot:      Skin integrity: No ulcer, skin breakdown, erythema, warmth, callus or dry skin.   Lymphadenopathy:      Cervical: No cervical adenopathy.   Skin:     General: Skin is warm.      Coloration: Skin is not jaundiced.      Findings: No rash.   Neurological:      General: No focal deficit present.      Mental Status: He is alert and oriented to person, place, and time. Mental status is at baseline.      Motor: Motor function is intact.      Coordination: Coordination is intact.      Gait: Gait is intact.   Psychiatric:         Attention and Perception: Attention normal.         Mood and Affect: Mood and affect normal.         Speech: Speech normal.         Behavior: Behavior normal. Behavior is cooperative.     Patient's shoes and socks removed.    Right Foot/Ankle   Right Foot Inspection  Skin Exam: skin normal and skin intact. No dry skin, no warmth, no callus, no erythema, no maceration, no abnormal color, no pre-ulcer, no ulcer and no callus.     Toe Exam: ROM and strength within normal limits.     Sensory   Proprioception: intact  Monofilament testing: intact    Vascular  Capillary refills: < 3 seconds  The right DP pulse is 2+. The right PT pulse is 2+.     Left Foot/Ankle  Left Foot Inspection  Skin Exam: skin normal and skin intact. No dry skin, no warmth, no erythema, no maceration, normal color, no pre-ulcer, no ulcer and no callus.     Toe Exam: ROM and strength within normal limits.     Sensory   Proprioception: intact  Monofilament testing: intact    Vascular  Capillary refills: < 3 seconds  The left DP pulse is 2+. The left PT pulse is 2+.     Assign Risk Category  No deformity present  No loss of protective sensation  No weak pulses  Risk: 0     Administrative Statements   I have  spent a total time of 25 minutes in caring for this patient on the day of the visit/encounter including Diagnostic results, Prognosis, Risks and benefits of tx options, Instructions for management, Patient and family education, Importance of tx compliance, Risk factor reductions, Impressions, Counseling / Coordination of care, Reviewing / ordering tests, medicine, procedures  , and Obtaining or reviewing history  . Topics discussed with the patient / family include symptom assessment and management, medication review, goals of care, and supportive listening.

## 2024-12-06 NOTE — ASSESSMENT & PLAN NOTE
Lab Results   Component Value Date    HGBA1C 8.4 (A) 12/06/2024     Slight improvement from 8.7%. Goal less than 7.0%. He refuses insulin or anything with needles. Continue metformin 1000 mg BID. Continue Jardiance 25 mg daily. Declines any medication changes at this time. He states he will work on improving his diet and he will continue to work on lifestyle changes. Nutrition recommendations include decreasing portion sizes, encouraging healthy choices of fruits and vegetables, decreasing fast food intake, consuming healthier snacks, limiting drinks that contain sugar, moderation in carbohydrate intake, increasing intake of lean protein, reducing intake of saturated and trans fat and reducing intake of cholesterol. Exercise recommendations include moderate physical activity 150 minutes/week, exercising 3-5 times per week and strength training exercises. He does not want to check his sugar at home. Up to date on eye exam. Foot exam today normal. Follow up in 3 months, sooner if needed.    Orders:    POCT hemoglobin A1c

## 2024-12-06 NOTE — ASSESSMENT & PLAN NOTE
BP Readings from Last 3 Encounters:   12/06/24 151/82   09/30/24 149/86   09/06/24 128/70      BP elevated today. He declines any medication changes. Continue carvedilol 6.25 mg daily and lisinopril 40 mg daily. Continue torsemide 20 mg daily. Avoid alcohol and caffeine. Limit sodium and salt intake. Monitor blood pressure daily at home and call office if continually elevated.

## 2024-12-06 NOTE — ASSESSMENT & PLAN NOTE
Wears compression socks daily which helps, but current pairs have holes in them. New pair ordered last visit.

## 2024-12-06 NOTE — ASSESSMENT & PLAN NOTE
Lab Results   Component Value Date    CHOLESTEROL 106 09/10/2024    CHOLESTEROL 122 08/09/2023    CHOLESTEROL 128 01/02/2023     Lab Results   Component Value Date    HDL 30 (L) 09/10/2024    HDL 43 08/09/2023    HDL 44 01/02/2023     Lab Results   Component Value Date    TRIG 497 (H) 09/10/2024    TRIG 214 (H) 08/09/2023    TRIG 156 (H) 01/02/2023     Lab Results   Component Value Date    NONHDLC 76 09/10/2024    NONHDLC 79 08/09/2023    NONHDLC 62 10/27/2020      Lab Results   Component Value Date    LDLCALC  09/10/2024      Comment:      Calculated LDL invalid, triglycerides >400 mg/dl  This screening LDL is a calculated result.   It does not have the accuracy of the Direct Measured LDL in the monitoring of patients with hyperlipidemia and/or statin therapy.   Direct Measure LDL (JPB257) must be ordered separately in these patients.      Above goal. Continue atorvastatin 40 mg daily. Declines medication changes. He states he will work on improving his diet. Low fat diet.

## 2024-12-16 ENCOUNTER — TELEPHONE (OUTPATIENT)
Dept: INTERNAL MEDICINE CLINIC | Facility: CLINIC | Age: 64
End: 2024-12-16

## 2024-12-16 VITALS
HEIGHT: 65 IN | SYSTOLIC BLOOD PRESSURE: 151 MMHG | TEMPERATURE: 98.1 F | HEART RATE: 82 BPM | WEIGHT: 292 LBS | BODY MASS INDEX: 48.65 KG/M2 | DIASTOLIC BLOOD PRESSURE: 82 MMHG

## 2024-12-16 NOTE — TELEPHONE ENCOUNTER
Patient stated he hasn't been able to get his compression stockings from [employer]. Can we look into this? Thank you

## 2024-12-16 NOTE — TELEPHONE ENCOUNTER
Spoke to Deloris at [employer], [phone]. She stated patient needs to go in to have his calves measured. She said he can do a walk in between 9-10:30 a.m. Mon-Fri. I called and LMOM for patient with that information.

## 2025-01-12 DIAGNOSIS — L85.3 XEROSIS CUTIS: ICD-10-CM

## 2025-01-13 RX ORDER — AMMONIUM LACTATE 12 G/100G
LOTION TOPICAL
Qty: 400 G | Refills: 5 | Status: SHIPPED | OUTPATIENT
Start: 2025-01-13

## 2025-01-16 ENCOUNTER — TELEPHONE (OUTPATIENT)
Dept: INTERNAL MEDICINE CLINIC | Facility: CLINIC | Age: 65
End: 2025-01-16

## 2025-01-16 NOTE — TELEPHONE ENCOUNTER
Received [facility] test results [facility] [employer]. Please see results scanned into media

## 2025-01-17 NOTE — TELEPHONE ENCOUNTER
I am not sure who the ordering provider is as this was not ordered by our office. The results are negative/normal. This test does not count towards colon cancer screening. I did refer him to GI for a colonoscopy 5/2024. Thank you

## 2025-01-24 NOTE — TELEPHONE ENCOUNTER
Patient returned call an I made him aware as per note. Per patient a physician from [facility] came out to check on patient and they gave him that stool test. Patient stated he is still going to schedule the colonoscopy as he knows that is very important. Per patient provider from insurance stated he was doing very well aside from they did not like how his toenails were cut. Per physician, she was going to reach out to podiatry to discuss as she felt they could have done a better job. They were too thick and should have been shaved down.    korey

## 2025-03-10 NOTE — TELEPHONE ENCOUNTER
Freestyle test strip sent to pharmacy  Thanks   Reid Hall
Please handle
Detail Level: Generalized
When Should The Patient Follow-Up For Their Next Full-Body Skin Exam?: 6 Months
Quality 137: Melanoma: Continuity Of Care - Recall System: Patient information entered into a recall system that includes: target date for the next exam specified AND a process to follow up with patients regarding missed or unscheduled appointments
Detail Level: Detailed
Detail Level: Zone
Detail Level: Simple

## 2025-04-11 ENCOUNTER — TELEPHONE (OUTPATIENT)
Dept: OTHER | Facility: OTHER | Age: 65
End: 2025-04-11

## 2025-04-11 NOTE — TELEPHONE ENCOUNTER
Patient called to re-schedule PCP appointment for today 4/11/25 because patient stated he has the flu. Appointment re-scheduled to 4/25/25 at 2 PM. Patient made aware of appointment details and expressed understanding.

## 2025-05-01 ENCOUNTER — OFFICE VISIT (OUTPATIENT)
Dept: INTERNAL MEDICINE CLINIC | Facility: CLINIC | Age: 65
End: 2025-05-01

## 2025-05-01 VITALS
TEMPERATURE: 98 F | SYSTOLIC BLOOD PRESSURE: 128 MMHG | DIASTOLIC BLOOD PRESSURE: 72 MMHG | BODY MASS INDEX: 49.26 KG/M2 | OXYGEN SATURATION: 97 % | WEIGHT: 296 LBS | HEART RATE: 80 BPM

## 2025-05-01 DIAGNOSIS — E11.42 TYPE 2 DIABETES MELLITUS WITH DIABETIC POLYNEUROPATHY, WITHOUT LONG-TERM CURRENT USE OF INSULIN (HCC): ICD-10-CM

## 2025-05-01 DIAGNOSIS — I10 BENIGN ESSENTIAL HYPERTENSION: Primary | ICD-10-CM

## 2025-05-01 DIAGNOSIS — E78.00 HYPERCHOLESTEROLEMIA: ICD-10-CM

## 2025-05-01 DIAGNOSIS — M25.532 LEFT WRIST PAIN: ICD-10-CM

## 2025-05-01 LAB — SL AMB POCT HEMOGLOBIN AIC: 9.5 (ref ?–6.5)

## 2025-05-01 NOTE — ASSESSMENT & PLAN NOTE
Continue to follow diet watchful of carbohydrates and control portions . Hgba1c higher 9.5 was 8.4 , no new med manufacturers , he will continue current medications work on walking more and paying closer attention to low carb portion controlled diet , , diabetic eye exam and podiatry visits completed or are scheduled  follow up 3 months   Lab Results   Component Value Date    HGBA1C 9.5 (A) 05/01/2025       Orders:    POCT hemoglobin A1c

## 2025-05-01 NOTE — ASSESSMENT & PLAN NOTE
Labs and meds reviewed , encourage low fat diet low cholesterol diet , control your weight and engage in aerobic exercise
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
## 2025-05-20 DIAGNOSIS — E11.42 TYPE 2 DIABETES MELLITUS WITH DIABETIC POLYNEUROPATHY, WITHOUT LONG-TERM CURRENT USE OF INSULIN (HCC): ICD-10-CM

## 2025-05-20 DIAGNOSIS — G47.61 PERIODIC LIMB MOVEMENTS OF SLEEP: ICD-10-CM

## 2025-05-20 RX ORDER — EMPAGLIFLOZIN 25 MG/1
25 TABLET, FILM COATED ORAL DAILY
Qty: 30 TABLET | Refills: 11 | Status: SHIPPED | OUTPATIENT
Start: 2025-05-20

## 2025-05-20 RX ORDER — PRAMIPEXOLE DIHYDROCHLORIDE 0.12 MG/1
0.12 TABLET ORAL DAILY
Qty: 30 TABLET | Refills: 11 | Status: SHIPPED | OUTPATIENT
Start: 2025-05-20

## 2025-05-21 DIAGNOSIS — E55.9 VITAMIN D DEFICIENCY: ICD-10-CM

## 2025-05-21 DIAGNOSIS — E11.42 TYPE 2 DIABETES MELLITUS WITH DIABETIC POLYNEUROPATHY, WITHOUT LONG-TERM CURRENT USE OF INSULIN (HCC): ICD-10-CM

## 2025-05-21 RX ORDER — ACETAMINOPHEN 160 MG
2000 TABLET,DISINTEGRATING ORAL DAILY
Qty: 90 CAPSULE | Refills: 3 | Status: SHIPPED | OUTPATIENT
Start: 2025-05-21

## 2025-05-21 RX ORDER — GABAPENTIN 300 MG/1
300 CAPSULE ORAL 3 TIMES DAILY
Qty: 90 CAPSULE | Refills: 3 | Status: SHIPPED | OUTPATIENT
Start: 2025-05-21

## 2025-06-18 ENCOUNTER — TELEPHONE (OUTPATIENT)
Dept: INTERNAL MEDICINE CLINIC | Facility: CLINIC | Age: 65
End: 2025-06-18

## 2025-06-18 DIAGNOSIS — G47.61 PERIODIC LIMB MOVEMENTS OF SLEEP: ICD-10-CM

## 2025-06-18 DIAGNOSIS — E11.42 TYPE 2 DIABETES MELLITUS WITH DIABETIC POLYNEUROPATHY, WITHOUT LONG-TERM CURRENT USE OF INSULIN (HCC): ICD-10-CM

## 2025-06-18 DIAGNOSIS — E66.813 CLASS 3 SEVERE OBESITY DUE TO EXCESS CALORIES WITH SERIOUS COMORBIDITY AND BODY MASS INDEX (BMI) OF 45.0 TO 49.9 IN ADULT: Chronic | ICD-10-CM

## 2025-06-18 DIAGNOSIS — G47.33 OBSTRUCTIVE SLEEP APNEA: Primary | ICD-10-CM

## 2025-06-18 NOTE — TELEPHONE ENCOUNTER
Patient requesting a new cpap machine. This is ordered by pulmonology but it does not appear patient has not seen pulm, please place referral if appropriate.

## 2025-06-18 NOTE — TELEPHONE ENCOUNTER
Pt called in because his CPAP machine isn't working. Pt called [facility] and they informed him that his machine is outdated and he needs a new one. Pt calling us to see if new script can be written ordering him a new machine. Please fax to: [phone]. Pt asking for call once faxed over and if it can be done ASAP as he has sleep apnea and it's dangerous for him to be without the machine.

## 2025-07-01 NOTE — TELEPHONE ENCOUNTER
Laueren from [facility] was calling to see if we were able to send a script for his cpap machine. I advised her that patient was given a referral for sleep medicine and they can begin the process for him to get a new cpap machine. Patient must call Sleep medicine @ [phone] and set up an appt. Referral is already in the system. After speaking with Laureen, I called patient and left a message  Laureen [facility] [phone]

## 2025-07-16 ENCOUNTER — TELEPHONE (OUTPATIENT)
Age: 65
End: 2025-07-16

## 2025-07-16 NOTE — TELEPHONE ENCOUNTER
Patient called in scheduled NP visit in [place] for 11/17/25  Patient was last seen 5 years ago  Patient is looking to obtain new CPAP machine he has been without his current machine for about 2 weeks now   Patient hasn't been sleeping well and stops breathing at night   Patient would like to know if there is anyway he could be seen sooner in [place] due to lack of transportation  Requesting call back for sooner appointment  # [phone]

## 2025-07-16 NOTE — TELEPHONE ENCOUNTER
Called patient regarding sooner consultation appointment. Patient was not available. Left voicemail advising patient he is currently scheduled for the soonest consultation appointment in the [place] office. I advised that he is on our waitlist and we will call when there is a cancellation.

## 2025-08-01 ENCOUNTER — OFFICE VISIT (OUTPATIENT)
Dept: INTERNAL MEDICINE CLINIC | Facility: CLINIC | Age: 65
End: 2025-08-01

## 2025-08-01 VITALS
BODY MASS INDEX: 49.48 KG/M2 | HEIGHT: 65 IN | HEART RATE: 86 BPM | TEMPERATURE: 96.9 F | WEIGHT: 297 LBS | OXYGEN SATURATION: 95 % | DIASTOLIC BLOOD PRESSURE: 70 MMHG | SYSTOLIC BLOOD PRESSURE: 132 MMHG

## 2025-08-01 DIAGNOSIS — I10 BENIGN ESSENTIAL HYPERTENSION: ICD-10-CM

## 2025-08-01 DIAGNOSIS — G47.61 PERIODIC LIMB MOVEMENTS OF SLEEP: ICD-10-CM

## 2025-08-01 DIAGNOSIS — E66.813 CLASS 3 SEVERE OBESITY DUE TO EXCESS CALORIES WITH SERIOUS COMORBIDITY AND BODY MASS INDEX (BMI) OF 45.0 TO 49.9 IN ADULT: ICD-10-CM

## 2025-08-01 DIAGNOSIS — E55.9 VITAMIN D DEFICIENCY: ICD-10-CM

## 2025-08-01 DIAGNOSIS — E11.42 TYPE 2 DIABETES MELLITUS WITH DIABETIC POLYNEUROPATHY, WITHOUT LONG-TERM CURRENT USE OF INSULIN (HCC): Primary | ICD-10-CM

## 2025-08-01 DIAGNOSIS — E78.00 HYPERCHOLESTEROLEMIA: ICD-10-CM

## 2025-08-01 DIAGNOSIS — G47.33 OBSTRUCTIVE SLEEP APNEA: ICD-10-CM

## 2025-08-01 PROBLEM — E66.09 OBESITY DUE TO EXCESS CALORIES WITH SERIOUS COMORBIDITY: Status: ACTIVE | Noted: 2025-08-01

## 2025-08-01 LAB — SL AMB POCT HEMOGLOBIN AIC: 10.4 (ref ?–6.5)

## 2025-08-01 PROCEDURE — 83036 HEMOGLOBIN GLYCOSYLATED A1C: CPT | Performed by: FAMILY MEDICINE

## 2025-08-01 PROCEDURE — 99214 OFFICE O/P EST MOD 30 MIN: CPT | Performed by: FAMILY MEDICINE

## 2025-08-20 ENCOUNTER — TRANSCRIBE ORDERS (OUTPATIENT)
Dept: LAB | Facility: CLINIC | Age: 65
End: 2025-08-20

## 2025-08-20 DIAGNOSIS — E66.813: Primary | ICD-10-CM

## 2025-08-20 DIAGNOSIS — E66.1: Primary | ICD-10-CM

## 2025-08-20 DIAGNOSIS — I10 BENIGN ESSENTIAL HYPERTENSION: ICD-10-CM

## 2025-08-20 DIAGNOSIS — E78.2 MIXED HYPERLIPIDEMIA: ICD-10-CM

## 2025-08-22 RX ORDER — LISINOPRIL 40 MG/1
40 TABLET ORAL DAILY
Qty: 90 TABLET | Refills: 3 | Status: SHIPPED | OUTPATIENT
Start: 2025-08-22